# Patient Record
Sex: FEMALE | Race: WHITE | NOT HISPANIC OR LATINO | Employment: FULL TIME | URBAN - NONMETROPOLITAN AREA
[De-identification: names, ages, dates, MRNs, and addresses within clinical notes are randomized per-mention and may not be internally consistent; named-entity substitution may affect disease eponyms.]

---

## 2017-01-17 ENCOUNTER — HOSPITAL ENCOUNTER (OUTPATIENT)
Dept: URGENT CARE | Facility: CLINIC | Age: 22
Discharge: HOME | End: 2017-01-17
Attending: EMERGENCY MEDICINE | Admitting: EMERGENCY MEDICINE

## 2017-02-02 ENCOUNTER — OFFICE VISIT (OUTPATIENT)
Dept: FAMILY MEDICINE CLINIC | Facility: CLINIC | Age: 22
End: 2017-02-02

## 2017-02-02 VITALS
HEART RATE: 82 BPM | HEIGHT: 64 IN | SYSTOLIC BLOOD PRESSURE: 100 MMHG | BODY MASS INDEX: 20.14 KG/M2 | DIASTOLIC BLOOD PRESSURE: 60 MMHG | RESPIRATION RATE: 16 BRPM | WEIGHT: 118 LBS | TEMPERATURE: 97.9 F

## 2017-02-02 DIAGNOSIS — N63.0 BREAST MASS: Primary | ICD-10-CM

## 2017-02-02 PROCEDURE — 99213 OFFICE O/P EST LOW 20 MIN: CPT | Performed by: NURSE PRACTITIONER

## 2017-02-02 NOTE — PROGRESS NOTES
Subjective   Toshia Barraza is a 21 y.o. female.     Breast Mass   This is a chronic problem. The current episode started more than 1 month ago (breast mass noted left tail of calderon.  has had for several months.,  u/s showed a probable benign lymph node and recommeded that  repeat u//s be done in 3 months.  she can feel no changes in the mass.). The problem occurs constantly. The problem has been unchanged. Pertinent negatives include no abdominal pain, anorexia, arthralgias, change in bowel habit, chest pain, chills, congestion, coughing, diaphoresis, fatigue, fever, headaches, joint swelling, myalgias, nausea, neck pain, numbness, rash, sore throat, swollen glands, urinary symptoms, vertigo, visual change, vomiting or weakness. Nothing aggravates the symptoms. Treatments tried: has been on antibiotics and has had u/s. The treatment provided no relief (there have been no changes.).        The following portions of the patient's history were reviewed and updated as appropriate: allergies, current medications, past family history, past medical history, past social history, past surgical history and problem list.    Review of Systems   Constitutional: Negative.  Negative for chills, diaphoresis, fatigue and fever.   HENT: Negative for congestion and sore throat.    Respiratory: Negative.  Negative for cough.    Cardiovascular: Negative.  Negative for chest pain.   Gastrointestinal: Negative for abdominal pain, anorexia, change in bowel habit, nausea and vomiting.   Musculoskeletal: Negative.  Negative for arthralgias, joint swelling, myalgias and neck pain.   Skin: Negative.  Negative for rash.   Neurological: Negative.  Negative for vertigo, weakness, numbness and headaches.   Psychiatric/Behavioral: Negative.        Objective   Physical Exam   Constitutional: She is oriented to person, place, and time. She appears well-developed and well-nourished. No distress.   HENT:   Head: Normocephalic.   Eyes: Pupils are  equal, round, and reactive to light.   Neck: Normal range of motion.   Cardiovascular: Normal rate, regular rhythm and normal heart sounds.  Exam reveals no friction rub.    No murmur heard.  Pulmonary/Chest: Effort normal and breath sounds normal. No respiratory distress. She has no wheezes. She has no rales. Left breast exhibits mass (small palpable nodule left tail of calderon.). Left breast exhibits no inverted nipple, no nipple discharge, no skin change and no tenderness.   Musculoskeletal: Normal range of motion.   Neurological: She is alert and oriented to person, place, and time.   Skin: Skin is warm and dry.   Psychiatric: She has a normal mood and affect. Thought content normal.   Nursing note and vitals reviewed.      Assessment/Plan   Toshia was seen today for breast mass.    Diagnoses and all orders for this visit:    Breast mass    She is due for a new u/s.  [facility] [place].  Will reschedule.

## 2017-02-15 DIAGNOSIS — N63.0 BREAST MASS: Primary | ICD-10-CM

## 2017-10-02 ENCOUNTER — OFFICE VISIT (OUTPATIENT)
Dept: FAMILY MEDICINE CLINIC | Facility: CLINIC | Age: 22
End: 2017-10-02

## 2017-10-02 ENCOUNTER — LAB (OUTPATIENT)
Dept: LAB | Facility: OTHER | Age: 22
End: 2017-10-02

## 2017-10-02 VITALS
OXYGEN SATURATION: 98 % | TEMPERATURE: 98.6 F | RESPIRATION RATE: 16 BRPM | DIASTOLIC BLOOD PRESSURE: 69 MMHG | WEIGHT: 111 LBS | SYSTOLIC BLOOD PRESSURE: 102 MMHG | HEART RATE: 77 BPM | BODY MASS INDEX: 18.95 KG/M2 | HEIGHT: 64 IN

## 2017-10-02 DIAGNOSIS — R63.4 WEIGHT LOSS: ICD-10-CM

## 2017-10-02 DIAGNOSIS — G25.81 RLS (RESTLESS LEGS SYNDROME): Primary | ICD-10-CM

## 2017-10-02 LAB
ALBUMIN SERPL-MCNC: 4.4 G/DL (ref 3.2–5.5)
ALBUMIN/GLOB SERPL: 1.5 G/DL (ref 1–3)
ALP SERPL-CCNC: 62 U/L (ref 15–121)
ALT SERPL W P-5'-P-CCNC: 23 U/L (ref 10–60)
ANION GAP SERPL CALCULATED.3IONS-SCNC: 11 MMOL/L (ref 5–15)
AST SERPL-CCNC: 25 U/L (ref 10–60)
BASOPHILS # BLD AUTO: 0.02 10*3/MM3 (ref 0–0.2)
BASOPHILS NFR BLD AUTO: 0.4 % (ref 0–2)
BILIRUB SERPL-MCNC: 0.4 MG/DL (ref 0.2–1)
BUN BLD-MCNC: 14 MG/DL (ref 8–25)
BUN/CREAT SERPL: 23.3 (ref 7–25)
CALCIUM SPEC-SCNC: 9 MG/DL (ref 8.4–10.8)
CHLORIDE SERPL-SCNC: 103 MMOL/L (ref 100–112)
CO2 SERPL-SCNC: 27 MMOL/L (ref 20–32)
CREAT BLD-MCNC: 0.6 MG/DL (ref 0.4–1.3)
DEPRECATED RDW RBC AUTO: 39.5 FL (ref 36.4–46.3)
EOSINOPHIL # BLD AUTO: 0.41 10*3/MM3 (ref 0–0.7)
EOSINOPHIL NFR BLD AUTO: 7.6 % (ref 0–7)
ERYTHROCYTE [DISTWIDTH] IN BLOOD BY AUTOMATED COUNT: 12.4 % (ref 11.5–14.5)
GFR SERPL CREATININE-BSD FRML MDRD: 127 ML/MIN/1.73 (ref 71–165)
GLOBULIN UR ELPH-MCNC: 2.9 GM/DL (ref 2.5–4.6)
GLUCOSE BLD-MCNC: 61 MG/DL (ref 70–100)
HCT VFR BLD AUTO: 38.5 % (ref 35–45)
HGB BLD-MCNC: 13.2 G/DL (ref 12–15.5)
LYMPHOCYTES # BLD AUTO: 1.83 10*3/MM3 (ref 0.6–4.2)
LYMPHOCYTES NFR BLD AUTO: 34.1 % (ref 10–50)
MCH RBC QN AUTO: 30.6 PG (ref 26.5–34)
MCHC RBC AUTO-ENTMCNC: 34.3 G/DL (ref 31.4–36)
MCV RBC AUTO: 89.1 FL (ref 80–98)
MONOCYTES # BLD AUTO: 0.51 10*3/MM3 (ref 0–0.9)
MONOCYTES NFR BLD AUTO: 9.5 % (ref 0–12)
NEUTROPHILS # BLD AUTO: 2.6 10*3/MM3 (ref 2–8.6)
NEUTROPHILS NFR BLD AUTO: 48.4 % (ref 37–80)
PLATELET # BLD AUTO: 203 10*3/MM3 (ref 150–450)
PMV BLD AUTO: 11.6 FL (ref 8–12)
POTASSIUM BLD-SCNC: 3.9 MMOL/L (ref 3.4–5.4)
PROT SERPL-MCNC: 7.3 G/DL (ref 6.7–8.2)
RBC # BLD AUTO: 4.32 10*6/MM3 (ref 3.77–5.16)
SODIUM BLD-SCNC: 141 MMOL/L (ref 134–146)
WBC NRBC COR # BLD: 5.37 10*3/MM3 (ref 3.2–9.8)

## 2017-10-02 PROCEDURE — 99213 OFFICE O/P EST LOW 20 MIN: CPT | Performed by: NURSE PRACTITIONER

## 2017-10-02 PROCEDURE — 84443 ASSAY THYROID STIM HORMONE: CPT | Performed by: NURSE PRACTITIONER

## 2017-10-02 PROCEDURE — 83540 ASSAY OF IRON: CPT | Performed by: NURSE PRACTITIONER

## 2017-10-02 PROCEDURE — 85025 COMPLETE CBC W/AUTO DIFF WBC: CPT | Performed by: NURSE PRACTITIONER

## 2017-10-02 PROCEDURE — 80053 COMPREHEN METABOLIC PANEL: CPT | Performed by: NURSE PRACTITIONER

## 2017-10-02 PROCEDURE — 36415 COLL VENOUS BLD VENIPUNCTURE: CPT | Performed by: NURSE PRACTITIONER

## 2017-10-02 PROCEDURE — 83550 IRON BINDING TEST: CPT | Performed by: NURSE PRACTITIONER

## 2017-10-02 RX ORDER — ROPINIROLE 0.5 MG/1
0.5 TABLET, FILM COATED ORAL NIGHTLY
Qty: 30 TABLET | Refills: 3 | Status: SHIPPED | OUTPATIENT
Start: 2017-10-02 | End: 2017-11-07 | Stop reason: DRUGHIGH

## 2017-10-03 LAB
IRON 24H UR-MRATE: 73 MCG/DL (ref 37–170)
IRON SATN MFR SERPL: 26 % (ref 15–50)
TIBC SERPL-MCNC: 281 MCG/DL (ref 265–497)
TSH SERPL DL<=0.05 MIU/L-ACNC: 1.09 MIU/ML (ref 0.46–4.68)

## 2017-11-07 ENCOUNTER — OFFICE VISIT (OUTPATIENT)
Dept: FAMILY MEDICINE CLINIC | Facility: CLINIC | Age: 22
End: 2017-11-07

## 2017-11-07 VITALS
HEIGHT: 64 IN | SYSTOLIC BLOOD PRESSURE: 100 MMHG | BODY MASS INDEX: 18.95 KG/M2 | RESPIRATION RATE: 20 BRPM | WEIGHT: 111 LBS | TEMPERATURE: 98.2 F | OXYGEN SATURATION: 98 % | HEART RATE: 84 BPM | DIASTOLIC BLOOD PRESSURE: 58 MMHG

## 2017-11-07 DIAGNOSIS — M25.562 LEFT KNEE PAIN, UNSPECIFIED CHRONICITY: Primary | ICD-10-CM

## 2017-11-07 DIAGNOSIS — G25.81 RLS (RESTLESS LEGS SYNDROME): ICD-10-CM

## 2017-11-07 PROCEDURE — 99213 OFFICE O/P EST LOW 20 MIN: CPT | Performed by: NURSE PRACTITIONER

## 2017-11-07 RX ORDER — ROPINIROLE 1 MG/1
1 TABLET, FILM COATED ORAL NIGHTLY
Qty: 30 TABLET | Refills: 5 | Status: SHIPPED | OUTPATIENT
Start: 2017-11-07 | End: 2018-05-08

## 2017-11-07 NOTE — PROGRESS NOTES
Subjective   Toshia Barraza is a 22 y.o. female.     HPI Comments: Here for malathi on her rls.  She says the requip does help, but she thinks it may need to be stronger.  Also she is having bilat knee pain with the worse being on the left.  No injuries reported.    Leg Pain    The incident occurred more than 1 week ago (has had knee pain for months.). There was no injury mechanism. The pain is present in the right knee and left knee. The quality of the pain is described as aching. The pain is at a severity of 7/10. The pain is severe. The pain has been intermittent since onset. Pertinent negatives include no inability to bear weight, loss of motion, loss of sensation, muscle weakness, numbness or tingling. She reports no foreign bodies present. The symptoms are aggravated by weight bearing. She has tried nothing for the symptoms. The treatment provided no relief.        The following portions of the patient's history were reviewed and updated as appropriate: allergies, current medications, past family history, past medical history, past social history, past surgical history and problem list.    Review of Systems   Constitutional: Negative.    HENT: Negative.    Respiratory: Negative.    Cardiovascular: Negative.    Musculoskeletal: Positive for arthralgias (knees).   Skin: Negative.    Neurological: Negative.  Negative for tingling and numbness.   Psychiatric/Behavioral: Negative.        Objective   Physical Exam   Constitutional: She is oriented to person, place, and time. She appears well-nourished.   HENT:   Head: Normocephalic.   Eyes: Pupils are equal, round, and reactive to light.   Neck: Normal range of motion. Neck supple. No thyromegaly present.   Cardiovascular: Normal rate, regular rhythm and normal heart sounds.  Exam reveals no friction rub.    No murmur heard.  Pulmonary/Chest: Effort normal and breath sounds normal. No respiratory distress. She has no wheezes. She has no rales.   Abdominal: Soft.    Musculoskeletal: Normal range of motion.        Right knee: She exhibits normal range of motion and no swelling. Tenderness found.        Left knee: She exhibits normal range of motion and no swelling. Tenderness found.   Has good rom of both knees.  Is able to bear weight without diff.  There is slight crepitus with rom of the left knee.  X ray of left knee is normal.   Neurological: She is alert and oriented to person, place, and time.   Skin: Skin is warm and dry.   Psychiatric: She has a normal mood and affect.   Nursing note and vitals reviewed.      Assessment/Plan   Toshia was seen today for leg pain.    Diagnoses and all orders for this visit:    Left knee pain, unspecified chronicity  -     Cancel: XR Knee 3 View Left  -     XR knee 4+ vw left    RLS (restless legs syndrome)  -     rOPINIRole (REQUIP) 1 MG tablet; Take 1 tablet by mouth Every Night. Take 1 hour before bedtime.      Have asked her to take tylenol arthritis that can be purchased otc.  Also her requip dosage can be increased to 1mg q hs.

## 2017-11-15 ENCOUNTER — APPOINTMENT (OUTPATIENT)
Dept: ULTRASOUND IMAGING | Facility: HOSPITAL | Age: 22
End: 2017-11-15

## 2017-11-15 ENCOUNTER — HOSPITAL ENCOUNTER (EMERGENCY)
Facility: HOSPITAL | Age: 22
Discharge: HOME | End: 2017-11-15
Attending: FAMILY MEDICINE | Admitting: FAMILY MEDICINE

## 2017-11-15 VITALS
RESPIRATION RATE: 18 BRPM | TEMPERATURE: 97.6 F | WEIGHT: 111 LBS | DIASTOLIC BLOOD PRESSURE: 72 MMHG | HEIGHT: 64 IN | BODY MASS INDEX: 18.95 KG/M2 | OXYGEN SATURATION: 99 % | SYSTOLIC BLOOD PRESSURE: 91 MMHG | HEART RATE: 75 BPM

## 2017-11-15 DIAGNOSIS — M79.605 LEG PAIN, ANTERIOR, LEFT: Primary | ICD-10-CM

## 2017-11-15 PROCEDURE — 99283 EMERGENCY DEPT VISIT LOW MDM: CPT

## 2017-11-15 PROCEDURE — 93971 EXTREMITY STUDY: CPT

## 2017-11-15 RX ORDER — IBUPROFEN 200 MG
200 TABLET ORAL EVERY 6 HOURS PRN
Qty: 30 TABLET | Refills: 0 | Status: SHIPPED | OUTPATIENT
Start: 2017-11-15 | End: 2017-11-25

## 2017-11-16 NOTE — ED PROVIDER NOTES
Subjective   HPI Comments: 22 year old presents for left leg pain and swelling for 6 months . She feels fluttering in her left vein .   Below her right knee - pain and fluttering in her vein .   Denies any tingling or numbness.   Recently seen by pcp for this and had x rays done - negative for any fracture.   Hurts in upper aspect of right leg.             History provided by:  Patient      Review of Systems   Constitutional: Negative for activity change, appetite change, chills, diaphoresis and fatigue.   HENT: Negative for congestion, dental problem, drooling, ear discharge and ear pain.    Eyes: Negative for pain, discharge, redness and itching.   Respiratory: Negative for apnea, cough, choking and chest tightness.    Cardiovascular: Negative for chest pain, palpitations and leg swelling.   Gastrointestinal: Negative for abdominal distention, abdominal pain, anal bleeding and blood in stool.   Endocrine: Negative for cold intolerance, heat intolerance, polydipsia and polyphagia.   Genitourinary: Negative for difficulty urinating, dyspareunia, dysuria, enuresis, flank pain and frequency.   Musculoskeletal: Negative for arthralgias, back pain, gait problem and joint swelling.   Allergic/Immunologic: Negative for environmental allergies, food allergies and immunocompromised state.   Neurological: Negative for seizures, light-headedness, numbness and headaches.   Psychiatric/Behavioral: Negative for agitation, behavioral problems, confusion and decreased concentration.       Past Medical History:   Diagnosis Date   • Acute bacterial sinusitis    • Distorted body image    • Early onset of delivery before 37 weeks    • Encounter for  visit     Post mague care status   • Encounter for  visit      visit status   • Malaise and fatigue    • Primigravida        No Known Allergies    Past Surgical History:   Procedure Laterality Date   • BREAST BIOPSY         Family History   Problem Relation Age  "of Onset   • Other Mother      hematologic disorder   • Heart disease Mother      ischemic heart disease   • Heart attack Mother    • Leukemia Mother    • Ovarian cancer Mother      questionable   • Heart disease Father    • Hypertension Father        Social History     Social History   • Marital status: Single     Spouse name: N/A   • Number of children: N/A   • Years of education: N/A     Social History Main Topics   • Smoking status: Never Smoker   • Smokeless tobacco: Never Used   • Alcohol use No   • Drug use: No   • Sexual activity: Yes     Birth control/ protection: Condom     Other Topics Concern   • None     Social History Narrative           Objective    BP 91/72  Pulse 75  Temp 97.6 °F (36.4 °C)  Resp 18  Ht 64\" (162.6 cm)  Wt 111 lb (50.3 kg)  LMP 11/02/2017 (Approximate)  SpO2 99%  BMI 19.05 kg/m2    Physical Exam   Constitutional: She is oriented to person, place, and time. She appears well-developed and well-nourished.   HENT:   Head: Normocephalic and atraumatic.   Right Ear: External ear normal.   Left Ear: External ear normal.   Nose: Nose normal.   Mouth/Throat: Oropharynx is clear and moist.   Eyes: Conjunctivae and EOM are normal. Pupils are equal, round, and reactive to light.   Neck: Normal range of motion. Neck supple.   Cardiovascular: Normal rate, regular rhythm, normal heart sounds and intact distal pulses.    Pulmonary/Chest: Effort normal and breath sounds normal. No accessory muscle usage. No respiratory distress. She exhibits no tenderness and no deformity.   Abdominal: Soft. Bowel sounds are normal. There is no tenderness.   Musculoskeletal: Normal range of motion.        Left knee: Tenderness found.        Legs:  Neurological: She is alert and oriented to person, place, and time. She has normal reflexes.   Skin: Skin is warm.   Psychiatric: She has a normal mood and affect. Her behavior is normal. Judgment and thought content normal.   Nursing note and vitals " reviewed.      Procedures         ED Course  ED Course     Labs Reviewed - No data to display  Xr Knee 4+ Vw Left    Result Date: 11/7/2017  Narrative: Left knee four view on 11/7/2017 CLINICAL INDICATION: Left knee pain COMPARISON: None FINDINGS: No joint effusion is noted. There are no fractures. Visualized joints are well aligned. No bony abnormality is noted.     Impression: No acute bony abnormality. Electronically signed by:  Homar Jarquin  11/7/2017 9:59 PM CST Workstation: [id number]    Us Venous Doppler Lower Extremity Left (duplex)    Result Date: 11/15/2017  Narrative: Exam: Left lower extremity venous ultrasound INDICATION: Left leg pain FINDINGS: The left common femoral, greater saphenous, profunda femoral, femoral, popliteal, posterior tibial and peroneal veins demonstrate normal flow compressibility and augmentation.     Impression: No evidence of deep venous thrombosis in the left lower extremity on the current examination Electronically signed by:  Shalom Gutierrez MD  11/15/2017 11:08 PM CST Workstation: [id number]      Discussed results with pt .   Chronic left leg pain for few months.     Pt already had x rays for this and was negative for bony anomaly.   Discussed need for follow up with pcp and possible neuro referral for possible neuropathy             MDM  Number of Diagnoses or Management Options  Leg pain, anterior, left:       Final diagnoses:   Leg pain, anterior, left            Kadi Arnold MD  11/15/17 8629

## 2017-11-16 NOTE — ED NOTES
Pt reports she has been having left knee pain for several months. States she had an xray last week for same. Also states she has varicose veins that are starting on her lower legs. States she is a  and is on her feet all the time.      Namrata Minor RN  11/15/17 1349

## 2018-01-25 ENCOUNTER — OFFICE VISIT (OUTPATIENT)
Dept: FAMILY MEDICINE CLINIC | Facility: CLINIC | Age: 23
End: 2018-01-25

## 2018-01-25 VITALS
SYSTOLIC BLOOD PRESSURE: 98 MMHG | TEMPERATURE: 98 F | DIASTOLIC BLOOD PRESSURE: 60 MMHG | WEIGHT: 113 LBS | HEIGHT: 64 IN | OXYGEN SATURATION: 98 % | BODY MASS INDEX: 19.29 KG/M2 | RESPIRATION RATE: 20 BRPM | HEART RATE: 75 BPM

## 2018-01-25 DIAGNOSIS — R20.0 NUMBNESS OF BOTH LOWER EXTREMITIES: Primary | ICD-10-CM

## 2018-01-25 DIAGNOSIS — M25.561 RIGHT KNEE PAIN, UNSPECIFIED CHRONICITY: ICD-10-CM

## 2018-01-25 PROCEDURE — 99214 OFFICE O/P EST MOD 30 MIN: CPT | Performed by: NURSE PRACTITIONER

## 2018-01-25 RX ORDER — NAPROXEN 500 MG/1
500 TABLET ORAL 2 TIMES DAILY WITH MEALS
Qty: 60 TABLET | Refills: 3 | Status: SHIPPED | OUTPATIENT
Start: 2018-01-25 | End: 2018-05-08

## 2018-01-26 PROBLEM — R20.0 NUMBNESS OF BOTH LOWER EXTREMITIES: Status: ACTIVE | Noted: 2018-01-26

## 2018-01-26 PROBLEM — M25.561 RIGHT KNEE PAIN: Status: ACTIVE | Noted: 2018-01-26

## 2018-01-26 NOTE — PROGRESS NOTES
Subjective   Toshia Barraza is a 22 y.o. female.     HPI Comments: Here today for malathi.  She cont to have numbness in her legs.  Has been to the er.  All tests so far have been negative.  Now she has new right knee pain.  No injuries have been reported.  Has had c/o left knee pain.  The x rays were negative.    Lower Extremity Issue   This is a chronic (right knee pain is new, but the c/o pain and numbness in legs is chronic and been going on for a while.) problem. The current episode started more than 1 year ago. The problem occurs intermittently. The problem has been waxing and waning. Associated symptoms include arthralgias, fatigue, myalgias, numbness and weakness. Pertinent negatives include no abdominal pain, anorexia, change in bowel habit, chest pain, chills, congestion, coughing, diaphoresis, fever, headaches, joint swelling, nausea, neck pain, rash, sore throat, swollen glands, urinary symptoms, vertigo, visual change or vomiting. The symptoms are aggravated by stress and standing. Treatments tried: has been on nsaid and requip. The treatment provided no relief.        The following portions of the patient's history were reviewed and updated as appropriate: allergies, current medications, past family history, past medical history, past social history, past surgical history and problem list.    Review of Systems   Constitutional: Positive for fatigue. Negative for chills, diaphoresis and fever.   HENT: Negative.  Negative for congestion and sore throat.    Respiratory: Negative.  Negative for cough.    Cardiovascular: Negative for chest pain.   Gastrointestinal: Negative.  Negative for abdominal pain, anorexia, change in bowel habit, nausea and vomiting.   Musculoskeletal: Positive for arthralgias and myalgias. Negative for joint swelling and neck pain.   Skin: Negative.  Negative for rash.   Neurological: Positive for weakness and numbness. Negative for vertigo and headaches.   Psychiatric/Behavioral:  Negative.        Objective   Physical Exam   Constitutional: She is oriented to person, place, and time. She appears well-developed and well-nourished. No distress.   HENT:   Head: Normocephalic.   Eyes: Pupils are equal, round, and reactive to light.   Neck: Normal range of motion. Neck supple. No thyromegaly present.   Cardiovascular: Normal rate, regular rhythm and normal heart sounds.  Exam reveals no friction rub.    No murmur heard.  Pulmonary/Chest: Effort normal and breath sounds normal. No respiratory distress. She has no wheezes.   Abdominal: Soft.   Musculoskeletal: Normal range of motion.        Right knee: She exhibits normal range of motion and no swelling. Tenderness found.   X ray of right knee is negative.   Neurological: She is alert and oriented to person, place, and time.   Seems to have good sensation in both lower ext.   Skin: Skin is warm and dry.   Psychiatric: She has a normal mood and affect. Thought content normal.   Nursing note and vitals reviewed.      Assessment/Plan   Toshia was seen today for restless legs syndrome.    Diagnoses and all orders for this visit:    Numbness of both lower extremities  -     Ambulatory Referral to Neurology    Right knee pain, unspecified chronicity  -     Cancel: XR Knee 1 or 2 View Right (In Office)  -     XR knee 4+ vw right  -     naproxen (NAPROSYN) 500 MG tablet; Take 1 tablet by mouth 2 (Two) Times a Day With Meals.

## 2018-05-08 ENCOUNTER — PROCEDURE VISIT (OUTPATIENT)
Dept: OBSTETRICS AND GYNECOLOGY | Facility: CLINIC | Age: 23
End: 2018-05-08

## 2018-05-08 VITALS
SYSTOLIC BLOOD PRESSURE: 116 MMHG | HEIGHT: 64 IN | WEIGHT: 116 LBS | DIASTOLIC BLOOD PRESSURE: 60 MMHG | BODY MASS INDEX: 19.81 KG/M2

## 2018-05-08 DIAGNOSIS — Z30.011 ENCOUNTER FOR INITIAL PRESCRIPTION OF CONTRACEPTIVE PILLS: ICD-10-CM

## 2018-05-08 DIAGNOSIS — Z01.419 WELL WOMAN EXAM WITH ROUTINE GYNECOLOGICAL EXAM: ICD-10-CM

## 2018-05-08 DIAGNOSIS — R10.2 PELVIC PAIN: ICD-10-CM

## 2018-05-08 DIAGNOSIS — N89.8 VAGINAL DISCHARGE: Primary | ICD-10-CM

## 2018-05-08 LAB
CANDIDA ALBICANS: NEGATIVE
GARDNERELLA VAGINALIS: POSITIVE
TRICHOMONAS VAGINALIS PCR: NEGATIVE

## 2018-05-08 PROCEDURE — 87491 CHLMYD TRACH DNA AMP PROBE: CPT | Performed by: OBSTETRICS & GYNECOLOGY

## 2018-05-08 PROCEDURE — 87660 TRICHOMONAS VAGIN DIR PROBE: CPT | Performed by: OBSTETRICS & GYNECOLOGY

## 2018-05-08 PROCEDURE — 99214 OFFICE O/P EST MOD 30 MIN: CPT | Performed by: OBSTETRICS & GYNECOLOGY

## 2018-05-08 PROCEDURE — 87591 N.GONORRHOEAE DNA AMP PROB: CPT | Performed by: OBSTETRICS & GYNECOLOGY

## 2018-05-08 PROCEDURE — 87510 GARDNER VAG DNA DIR PROBE: CPT | Performed by: OBSTETRICS & GYNECOLOGY

## 2018-05-08 PROCEDURE — 87661 TRICHOMONAS VAGINALIS AMPLIF: CPT | Performed by: OBSTETRICS & GYNECOLOGY

## 2018-05-08 PROCEDURE — G0123 SCREEN CERV/VAG THIN LAYER: HCPCS | Performed by: OBSTETRICS & GYNECOLOGY

## 2018-05-08 PROCEDURE — 87480 CANDIDA DNA DIR PROBE: CPT | Performed by: OBSTETRICS & GYNECOLOGY

## 2018-05-08 RX ORDER — DESOGESTREL AND ETHINYL ESTRADIOL 0.15-0.03
1 KIT ORAL DAILY
Qty: 28 TABLET | Refills: 12 | Status: SHIPPED | OUTPATIENT
Start: 2018-05-08 | End: 2018-09-19

## 2018-05-08 NOTE — PROGRESS NOTES
"  No chief complaint on file.    Toshia Barraza is a 23 y.o. year old .  Patient's last menstrual period was 2018 (within weeks).  She presents with a chief complaint of lower abdominal pain for the past month and a half.  She's also noted a discharge with an odor.  It all started after intercourse.  She does not have a new partner.  She sometimes uses condoms but not always.  When she had intercourse it felt like something exploded inside her.  It's been slowly getting better.  She would also like to get on birth control pills        Past Medical History:   Diagnosis Date   • Acute bacterial sinusitis    • Distorted body image    • Early onset of delivery before 37 weeks    • Encounter for  visit     Post  care status   • Encounter for  visit      visit status   • Malaise and fatigue    • Primigravida      Past Surgical History:   Procedure Laterality Date   • BREAST BIOPSY       No current outpatient prescriptions on file.  No Known Allergies  Smoking status: Never Smoker                                                              Smokeless tobacco: Never Used                        Review of Systems   Constitutional: Negative for activity change, appetite change, chills and fever.   Gastrointestinal: Negative for abdominal distention and abdominal pain.   Genitourinary: Positive for pelvic pain. Negative for difficulty urinating, dysuria, flank pain, genital sores, vaginal bleeding, vaginal discharge and vaginal pain.   All other systems reviewed and are negative.       /60   Ht 162.6 cm (64\")   Wt 52.6 kg (116 lb)   LMP 2018 (Within Weeks)   BMI 19.91 kg/m²     General:  well developed; well nourished  no acute distress   Thyroid: not examined   Lungs:  breathing is unlabored   Heart:  Not performed.   Breasts:  Not performed.   Abdomen: soft, non-tender; no masses   Pelvis: Clinical staff was present for exam  External genitalia:  normal " appearance of the external genitalia including Bartholin's and Lostine's glands.  :  urethral meatus normal;  Vaginal:  normal pink mucosa without prolapse or lesions.  Cervix:  normal appearance.  Uterus:  normal size, shape and consistency.  Adnexa:  normal bimanual exam of the adnexa.  Rectal:  digital rectal exam not performed; anus visually normal appearing.     Lab Review   No data reviewed      Imaging   No data reviewed    Assessment      Diagnosis Plan   1. Vaginal discharge  Chlamydia trachomatis, Neisseria gonorrhoeae, Trichomonas vaginalis, PCR - Swab, Cervix    Gardnerella vaginalis, Trichomonas vaginalis, Candida albicans, PCR - Swab, Vagina   2. Well woman exam with routine gynecological exam  Liquid-based Pap Smear, Screening   3. Pelvic pain  US Non-ob Transvaginal   4. Encounter for initial prescription of contraceptive pills            Plan   1. We'll get a pelvic ultrasound and see the patient back after the ultrasound secondary to the pain.  2. A Pap smear was performed of this patient is now 23.  3. Vaginal cultures and a cervical culture was done secondary to the discharge  4. A prescription for birth control pills is been sent to her pharmacy  5.   The risks of birth control pills were reviewed with the patient, the risks that were reviewed included but were not limited to, increased risk for heart disease, increased risk for stroke, increased risk of blood clot formation and increased risk for gallbladder disease.  We also reviewed the common side effects of birth control pills including headache, breast tenderness, nausea, irregular bleeding, and occasional missed periods.         This note was electronically signed.    Austen Mandel MD  May 8, 2018

## 2018-05-09 LAB
C TRACH RRNA CVX QL NAA+PROBE: NEGATIVE
N GONORRHOEA RRNA SPEC QL NAA+PROBE: NEGATIVE
T VAGINALIS DNA VAG QL PROBE+SIG AMP: NEGATIVE

## 2018-05-09 RX ORDER — METRONIDAZOLE 500 MG/1
500 TABLET ORAL 2 TIMES DAILY
Qty: 14 TABLET | Refills: 0 | Status: SHIPPED | OUTPATIENT
Start: 2018-05-09 | End: 2018-05-16

## 2018-05-11 LAB
LAB AP CASE REPORT: NORMAL
LAB AP GYN ADDITIONAL INFORMATION: NORMAL
LAB AP GYN OTHER FINDINGS: NORMAL
Lab: NORMAL
PATH INTERP SPEC-IMP: NORMAL
STAT OF ADQ CVX/VAG CYTO-IMP: NORMAL

## 2018-05-14 ENCOUNTER — OFFICE VISIT (OUTPATIENT)
Dept: OBSTETRICS AND GYNECOLOGY | Facility: CLINIC | Age: 23
End: 2018-05-14

## 2018-05-14 VITALS
DIASTOLIC BLOOD PRESSURE: 62 MMHG | BODY MASS INDEX: 19.81 KG/M2 | SYSTOLIC BLOOD PRESSURE: 110 MMHG | HEIGHT: 64 IN | WEIGHT: 116 LBS

## 2018-05-14 DIAGNOSIS — N89.8 VAGINAL DISCHARGE: Primary | ICD-10-CM

## 2018-05-14 DIAGNOSIS — R10.2 PELVIC PAIN: ICD-10-CM

## 2018-05-14 PROCEDURE — 99213 OFFICE O/P EST LOW 20 MIN: CPT | Performed by: OBSTETRICS & GYNECOLOGY

## 2018-05-14 NOTE — PROGRESS NOTES
"Subjective   Chief Complaint   Patient presents with   • Follow-up     scan result     Toshia Barraza is a 23 y.o. year old  who comes to review her recent testing and discuss next steps.She was seen on 18 with a chief complaint of lower abdominal pain.  She notes the pain is now better.  She also had a discharge with an odor.  She was positive for BV and that's been treated.  She had an ultrasound today which is essentially within normal limits although uterus is slightly enlarged with a volume of 126.39 cm³.  The right and left ovaries appear normal.      Review of Systems   Constitutional: Negative for activity change, appetite change, chills and fever.   Gastrointestinal: Negative for abdominal distention and abdominal pain.   Genitourinary: Positive for pelvic pain. Negative for difficulty urinating, dysuria, flank pain, genital sores, vaginal bleeding, vaginal discharge and vaginal pain.          Objective   /62   Ht 162.6 cm (64\")   Wt 52.6 kg (116 lb)   LMP 2018 (Within Weeks)   Breastfeeding? No   BMI 19.91 kg/m²     Physical Exam      General:  well developed; well nourished  no acute distress     Thyroid: not examined     Heart:  Not performed.     Lungs:  breathing is unlabored     Breasts:  Not performed.     Abdomen: Not performed.     Pelvis: Clinical staff was present for exam         Lab Review   No data reviewed      Imaging   Pelvic ultrasound report           Assessment       Improving pelvic pain  Plan   1. As the pain is improving, we will just watch her conservatively.  If the pain worsen she is to return.            This note was electronically signed.    Austen Mandel M.D.  May 14, 2018    "

## 2018-06-21 ENCOUNTER — TELEPHONE (OUTPATIENT)
Dept: FAMILY MEDICINE CLINIC | Facility: CLINIC | Age: 23
End: 2018-06-21

## 2018-06-21 ENCOUNTER — OFFICE VISIT (OUTPATIENT)
Dept: FAMILY MEDICINE CLINIC | Facility: CLINIC | Age: 23
End: 2018-06-21

## 2018-06-21 VITALS
HEIGHT: 64 IN | RESPIRATION RATE: 16 BRPM | TEMPERATURE: 97.9 F | SYSTOLIC BLOOD PRESSURE: 110 MMHG | BODY MASS INDEX: 19.46 KG/M2 | WEIGHT: 114 LBS | HEART RATE: 87 BPM | DIASTOLIC BLOOD PRESSURE: 60 MMHG | OXYGEN SATURATION: 97 %

## 2018-06-21 DIAGNOSIS — M79.602 LEFT ARM PAIN: ICD-10-CM

## 2018-06-21 DIAGNOSIS — R68.84 JAW PAIN: ICD-10-CM

## 2018-06-21 DIAGNOSIS — M25.562 ACUTE PAIN OF LEFT KNEE: ICD-10-CM
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
## 2018-06-21 DIAGNOSIS — M25.532 LEFT WRIST PAIN: ICD-10-CM

## 2018-06-21 DIAGNOSIS — R11.0 NAUSEA: ICD-10-CM

## 2018-06-21 DIAGNOSIS — G44.319 ACUTE POST-TRAUMATIC HEADACHE, NOT INTRACTABLE: ICD-10-CM

## 2018-06-21 DIAGNOSIS — M54.50 ACUTE LEFT-SIDED LOW BACK PAIN WITHOUT SCIATICA: ICD-10-CM

## 2018-06-21 PROCEDURE — 99214 OFFICE O/P EST MOD 30 MIN: CPT | Performed by: NURSE PRACTITIONER

## 2018-06-21 RX ORDER — IBUPROFEN 800 MG/1
800 TABLET ORAL EVERY 8 HOURS PRN
Qty: 90 TABLET | Refills: 0 | Status: SHIPPED | OUTPATIENT
Start: 2018-06-21 | End: 2018-11-05 | Stop reason: SDUPTHER

## 2018-06-21 RX ORDER — KETOROLAC TROMETHAMINE 30 MG/ML
60 INJECTION, SOLUTION INTRAMUSCULAR; INTRAVENOUS ONCE
Status: SHIPPED | OUTPATIENT
Start: 2018-06-21 | End: 2018-06-26

## 2018-06-21 RX ORDER — PREDNISONE 20 MG/1
TABLET ORAL
Qty: 24 TABLET | Refills: 0 | Status: SHIPPED | OUTPATIENT
Start: 2018-06-21 | End: 2018-08-21

## 2018-06-21 RX ORDER — ONDANSETRON 4 MG/1
4 TABLET, FILM COATED ORAL EVERY 8 HOURS PRN
Qty: 30 TABLET | Refills: 0 | Status: SHIPPED | OUTPATIENT
Start: 2018-06-21 | End: 2018-09-19

## 2018-06-21 RX ORDER — SUMATRIPTAN 25 MG/1
TABLET, FILM COATED ORAL
Qty: 20 TABLET | Refills: 0 | Status: SHIPPED | OUTPATIENT
Start: 2018-06-21 | End: 2018-11-16 | Stop reason: SDUPTHER

## 2018-06-21 RX ORDER — TRIAMCINOLONE ACETONIDE 40 MG/ML
40 INJECTION, SUSPENSION INTRA-ARTICULAR; INTRAMUSCULAR ONCE
Status: DISCONTINUED | OUTPATIENT
Start: 2018-06-21 | End: 2019-09-04

## 2018-06-21 NOTE — TELEPHONE ENCOUNTER
----- Message from LATRICE Elizabeth sent at 6/21/2018 12:31 PM CDT -----  Left knee, left wrist and arm show no fractures or any abnormalities so most likely a lot of inflammation still present causing pain for her.

## 2018-06-21 NOTE — TELEPHONE ENCOUNTER
Called pt and relayed results. She was happy and she also had a question about PT and something else I didn't see anything in the chart yet.
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
## 2018-06-28 ENCOUNTER — OFFICE VISIT (OUTPATIENT)
Dept: FAMILY MEDICINE CLINIC | Facility: CLINIC | Age: 23
End: 2018-06-28

## 2018-06-28 VITALS
RESPIRATION RATE: 16 BRPM | BODY MASS INDEX: 19.46 KG/M2 | TEMPERATURE: 98.5 F | WEIGHT: 114 LBS | DIASTOLIC BLOOD PRESSURE: 50 MMHG | SYSTOLIC BLOOD PRESSURE: 90 MMHG | OXYGEN SATURATION: 98 % | HEART RATE: 75 BPM | HEIGHT: 64 IN

## 2018-06-28 DIAGNOSIS — R51.9 ACUTE NONINTRACTABLE HEADACHE, UNSPECIFIED HEADACHE TYPE: ICD-10-CM

## 2018-06-28 DIAGNOSIS — R51.9 LT FACIAL PAIN: ICD-10-CM
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
## 2018-06-28 DIAGNOSIS — R68.84 JAW PAIN: ICD-10-CM

## 2018-06-28 DIAGNOSIS — M54.6 ACUTE LEFT-SIDED THORACIC BACK PAIN: ICD-10-CM

## 2018-06-28 PROCEDURE — 99214 OFFICE O/P EST MOD 30 MIN: CPT | Performed by: NURSE PRACTITIONER

## 2018-06-28 RX ORDER — METHOCARBAMOL 500 MG/1
500 TABLET, FILM COATED ORAL EVERY 6 HOURS PRN
Qty: 90 TABLET | Refills: 0 | Status: SHIPPED | OUTPATIENT
Start: 2018-06-28 | End: 2018-11-05 | Stop reason: SDUPTHER

## 2018-06-29 ENCOUNTER — TELEPHONE (OUTPATIENT)
Dept: FAMILY MEDICINE CLINIC | Facility: CLINIC | Age: 23
End: 2018-06-29
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
## 2018-07-03 ENCOUNTER — CONSULT (OUTPATIENT)
Dept: PHYSICAL THERAPY | Facility: CLINIC | Age: 23
End: 2018-07-03

## 2018-07-03 DIAGNOSIS — M54.6 ACUTE LEFT-SIDED THORACIC BACK PAIN: Primary | ICD-10-CM
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
## 2018-07-03 DIAGNOSIS — R51.9 FREQUENT HEADACHES: ICD-10-CM

## 2018-07-03 DIAGNOSIS — M54.2 CERVICAL PAIN: ICD-10-CM

## 2018-07-03 PROCEDURE — 97140 MANUAL THERAPY 1/> REGIONS: CPT | Performed by: PHYSICAL THERAPIST

## 2018-07-03 PROCEDURE — 97161 PT EVAL LOW COMPLEX 20 MIN: CPT | Performed by: PHYSICAL THERAPIST
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
## 2018-07-11 ENCOUNTER — TREATMENT (OUTPATIENT)
Dept: PHYSICAL THERAPY | Facility: CLINIC | Age: 23
End: 2018-07-11

## 2018-07-11 DIAGNOSIS — M54.6 ACUTE LEFT-SIDED THORACIC BACK PAIN: Primary | ICD-10-CM

## 2018-07-11 DIAGNOSIS — M54.2 CERVICAL PAIN: ICD-10-CM

## 2018-07-11 DIAGNOSIS — R51.9 FREQUENT HEADACHES: ICD-10-CM
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
## 2018-07-11 PROCEDURE — 97110 THERAPEUTIC EXERCISES: CPT | Performed by: PHYSICAL THERAPIST

## 2018-07-11 PROCEDURE — 97014 ELECTRIC STIMULATION THERAPY: CPT | Performed by: PHYSICAL THERAPIST
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
## 2018-07-18 ENCOUNTER — TREATMENT (OUTPATIENT)
Dept: PHYSICAL THERAPY | Facility: CLINIC | Age: 23
End: 2018-07-18

## 2018-07-18 DIAGNOSIS — M54.6 ACUTE LEFT-SIDED THORACIC BACK PAIN: Primary | ICD-10-CM
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
## 2018-07-18 DIAGNOSIS — M54.2 CERVICAL PAIN: ICD-10-CM

## 2018-07-18 DIAGNOSIS — R51.9 FREQUENT HEADACHES: ICD-10-CM

## 2018-07-18 PROCEDURE — 97014 ELECTRIC STIMULATION THERAPY: CPT | Performed by: PHYSICAL THERAPIST

## 2018-07-18 PROCEDURE — 97110 THERAPEUTIC EXERCISES: CPT | Performed by: PHYSICAL THERAPIST
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
## 2018-07-19 ENCOUNTER — TREATMENT (OUTPATIENT)
Dept: PHYSICAL THERAPY | Facility: CLINIC | Age: 23
End: 2018-07-19

## 2018-07-19 DIAGNOSIS — R51.9 FREQUENT HEADACHES: ICD-10-CM
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
## 2018-07-19 DIAGNOSIS — M54.2 CERVICAL PAIN: ICD-10-CM

## 2018-07-19 DIAGNOSIS — M54.6 ACUTE LEFT-SIDED THORACIC BACK PAIN: Primary | ICD-10-CM

## 2018-07-19 PROCEDURE — 97012 MECHANICAL TRACTION THERAPY: CPT | Performed by: PHYSICAL THERAPIST

## 2018-07-19 PROCEDURE — 97014 ELECTRIC STIMULATION THERAPY: CPT | Performed by: PHYSICAL THERAPIST

## 2018-07-19 PROCEDURE — 97110 THERAPEUTIC EXERCISES: CPT | Performed by: PHYSICAL THERAPIST
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
## 2018-08-01 ENCOUNTER — TREATMENT (OUTPATIENT)
Dept: PHYSICAL THERAPY | Facility: CLINIC | Age: 23
End: 2018-08-01
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
## 2018-08-01 DIAGNOSIS — M54.6 ACUTE LEFT-SIDED THORACIC BACK PAIN: Primary | ICD-10-CM

## 2018-08-01 DIAGNOSIS — R51.9 FREQUENT HEADACHES: ICD-10-CM

## 2018-08-01 DIAGNOSIS — M54.2 CERVICAL PAIN: ICD-10-CM

## 2018-08-01 PROCEDURE — 97110 THERAPEUTIC EXERCISES: CPT | Performed by: PHYSICAL THERAPIST

## 2018-08-01 PROCEDURE — 97012 MECHANICAL TRACTION THERAPY: CPT | Performed by: PHYSICAL THERAPIST

## 2018-08-01 NOTE — PROGRESS NOTES
"Recertification    Diagnosis/ICD-10 Code:  Acute left-sided thoracic back pain [M54.6]  Referring practitioner: LATRICE Elizabeth  Date of Initial Visit: 2018  Patient seen for 5/8 sessions    Time in: 1534 Time out: 1618 Total time: 44 min  Subjective:   Toshia Barraza states: pt reports less frequent HA's since starting PT, onset of HA's with intermittent left UE radicular symptoms. Pt reports 40% improvement overall.   Pt reports 4/10 left sided UT region pain.   Objective:   Current condition: Fair  Test measurement: cervical spine AROM: flex/ext WNL rotation B 75% min limit w/ left UT pain elicited with left rotation, lateral flex B WNL, NDI 32%, left shoulder flex to 150° before onset of L UT region pain.   NDI score 16/50-32%  Assessment:   Summary of Treatment:     Traction 73558  Traction Type: Cervical  Rx Minutes: 15  Duration: Intermittent  Position: Hook-lying  Weight: 22  Hold: 60  Relax: 10    EXERCISE-29 min     Exercise 2  Exercise Name 2: seated thoracic ext w/ clinician OP  Sets/Reps 2: 10 Exercise 3  Exercise Name 3: seated thoracic ext w/ self OP  Sets/Reps 3: 2/10 Exercise 4  Exercise Name 4: supine pec stretch w/ towel roll  Sets/Reps 4: \" hold Exercise 5  Exercise Name 5: seated cervical extensions   Sets/Reps 5: 2/10 Exercise 6  Exercise Name 6: manual: cervical traction  Sets/Reps 6: 3' total     Progress toward previous goals: Continue STG/LTG, recommend 2- 3 more mechanical tractions attempts. Address scapular strengthening and review thoracic mobility. Would benefit from UT and levator scapula stretching.        Plan:   Goals  ST.  Patient be independent in home exercise program.-MET  2.  Patient will have full cervical pain-free range of motion.-PROGRESSING  3.  Patient will have full left upper extremity shoulder elevation without pain.-PROGRESSING  4.  Patient will maintain cervical and thoracic positioning.-MET  5.  Patient reports minimal headaches.-NOT MET  6.  " Patient will have NDI score of 38% or less-MET  LT.  Patient will report 75-80% overall improvement.PROGESSING  2.  Patient will have NDI score of 30% or less.-PROGRESSING  3.  Patient will report no headaches.-NOT MET  4.  Patient will have minimal trigger point left upper trapezius-MET    Timeframe: 1 month  Prognosis to achieve goals: good    Plan  Treatment plan with rationale: The patient is to  be seen 2 time(s) per week, for 4 week(s) to progress toward short and long term goals.  Joint mobilizations to decrease pain and/or increase ROM   Soft tissue mobilization as therapeutically necessary to decrease pain and/or increase mobility  Therapeutic exercises to increase functional mobility  Perform modalities as therapeutically necessary to decrease pain and increase mobility  Recommendations: Initiate/continue PT services    PT Signature: Rafy Valadez, PT      Based upon review of the patient's progress and continued therapy plan, it is my medical opinion that Toshia Barraza should continue physical therapy treatment at [facility]  [address] [place] [address] Dr Halima ODOM [id number] [phone].    Signature:  LATRICE Elizabeth

## 2018-08-21 ENCOUNTER — OFFICE VISIT (OUTPATIENT)
Dept: FAMILY MEDICINE CLINIC | Facility: CLINIC | Age: 23
End: 2018-08-21

## 2018-08-21 VITALS
HEART RATE: 74 BPM | HEIGHT: 64 IN | WEIGHT: 115 LBS | SYSTOLIC BLOOD PRESSURE: 110 MMHG | DIASTOLIC BLOOD PRESSURE: 62 MMHG | RESPIRATION RATE: 14 BRPM | OXYGEN SATURATION: 98 % | TEMPERATURE: 98.8 F | BODY MASS INDEX: 19.63 KG/M2

## 2018-08-21 DIAGNOSIS — R51.9 NONINTRACTABLE EPISODIC HEADACHE, UNSPECIFIED HEADACHE TYPE: ICD-10-CM
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
## 2018-08-21 DIAGNOSIS — M54.2 NECK PAIN: ICD-10-CM

## 2018-08-21 DIAGNOSIS — M25.511 ACUTE PAIN OF RIGHT SHOULDER: ICD-10-CM

## 2018-08-21 DIAGNOSIS — M25.512 ACUTE PAIN OF LEFT SHOULDER: ICD-10-CM

## 2018-08-21 PROCEDURE — 96372 THER/PROPH/DIAG INJ SC/IM: CPT | Performed by: NURSE PRACTITIONER

## 2018-08-21 PROCEDURE — 99214 OFFICE O/P EST MOD 30 MIN: CPT | Performed by: NURSE PRACTITIONER

## 2018-08-21 RX ORDER — KETOROLAC TROMETHAMINE 30 MG/ML
60 INJECTION, SOLUTION INTRAMUSCULAR; INTRAVENOUS ONCE
Status: COMPLETED | OUTPATIENT
Start: 2018-08-21 | End: 2018-08-21

## 2018-08-21 RX ADMIN — KETOROLAC TROMETHAMINE 60 MG: 30 INJECTION, SOLUTION INTRAMUSCULAR; INTRAVENOUS at 18:15

## 2018-08-22 ENCOUNTER — TELEPHONE (OUTPATIENT)
Dept: FAMILY MEDICINE CLINIC | Facility: CLINIC | Age: 23
End: 2018-08-22

## 2018-08-22 DIAGNOSIS — M54.2 NECK PAIN: Primary | ICD-10-CM

## 2018-08-22 DIAGNOSIS — M25.519 NECK AND SHOULDER PAIN: ICD-10-CM
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
## 2018-08-22 DIAGNOSIS — M54.2 NECK AND SHOULDER PAIN: ICD-10-CM
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
## 2018-09-17 ENCOUNTER — OFFICE VISIT (OUTPATIENT)
Dept: ORTHOPEDIC SURGERY | Facility: CLINIC | Age: 23
End: 2018-09-17

## 2018-09-17 VITALS — HEIGHT: 64 IN | BODY MASS INDEX: 18.95 KG/M2 | WEIGHT: 111 LBS

## 2018-09-17 DIAGNOSIS — M54.2 NECK PAIN: Primary | ICD-10-CM

## 2018-09-17 PROCEDURE — 99203 OFFICE O/P NEW LOW 30 MIN: CPT | Performed by: ORTHOPAEDIC SURGERY
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
## 2018-09-19 ENCOUNTER — OFFICE VISIT (OUTPATIENT)
Dept: FAMILY MEDICINE CLINIC | Facility: CLINIC | Age: 23
End: 2018-09-19

## 2018-09-19 VITALS
DIASTOLIC BLOOD PRESSURE: 70 MMHG | BODY MASS INDEX: 19.09 KG/M2 | TEMPERATURE: 99.1 F | HEIGHT: 64 IN | WEIGHT: 111.8 LBS | SYSTOLIC BLOOD PRESSURE: 100 MMHG | HEART RATE: 71 BPM

## 2018-09-19 DIAGNOSIS — R51.9 ACUTE NONINTRACTABLE HEADACHE, UNSPECIFIED HEADACHE TYPE: ICD-10-CM

## 2018-09-19 DIAGNOSIS — M54.6 ACUTE BILATERAL THORACIC BACK PAIN: ICD-10-CM
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
## 2018-09-19 DIAGNOSIS — M54.2 NECK PAIN: ICD-10-CM

## 2018-09-19 PROCEDURE — 96372 THER/PROPH/DIAG INJ SC/IM: CPT | Performed by: NURSE PRACTITIONER

## 2018-09-19 PROCEDURE — 99213 OFFICE O/P EST LOW 20 MIN: CPT | Performed by: NURSE PRACTITIONER

## 2018-09-19 RX ORDER — KETOROLAC TROMETHAMINE 30 MG/ML
30 INJECTION, SOLUTION INTRAMUSCULAR; INTRAVENOUS ONCE
Status: COMPLETED | OUTPATIENT
Start: 2018-09-19 | End: 2018-09-19

## 2018-09-19 RX ORDER — TRIAMCINOLONE ACETONIDE 40 MG/ML
40 INJECTION, SUSPENSION INTRA-ARTICULAR; INTRAMUSCULAR ONCE
Status: COMPLETED | OUTPATIENT
Start: 2018-09-19 | End: 2018-09-19

## 2018-09-19 RX ADMIN — TRIAMCINOLONE ACETONIDE 40 MG: 40 INJECTION, SUSPENSION INTRA-ARTICULAR; INTRAMUSCULAR at 18:36

## 2018-09-19 RX ADMIN — KETOROLAC TROMETHAMINE 30 MG: 30 INJECTION, SOLUTION INTRAMUSCULAR; INTRAVENOUS at 18:37

## 2018-09-21 ENCOUNTER — HOSPITAL ENCOUNTER (OUTPATIENT)
Dept: MRI IMAGING | Facility: HOSPITAL | Age: 23
Discharge: HOME | End: 2018-09-21
Attending: ORTHOPAEDIC SURGERY

## 2018-09-21 ENCOUNTER — HOSPITAL ENCOUNTER (OUTPATIENT)
Dept: MRI IMAGING | Facility: HOSPITAL | Age: 23
Discharge: HOME | End: 2018-09-21
Attending: ORTHOPAEDIC SURGERY | Admitting: ORTHOPAEDIC SURGERY

## 2018-09-21 DIAGNOSIS — M54.2 NECK PAIN: ICD-10-CM

## 2018-09-21 PROCEDURE — 72141 MRI NECK SPINE W/O DYE: CPT

## 2018-09-21 PROCEDURE — 72146 MRI CHEST SPINE W/O DYE: CPT

## 2018-10-03 PROBLEM — M54.2 NECK PAIN: Status: ACTIVE | Noted: 2018-10-03

## 2018-10-03 PROBLEM — M54.6 ACUTE BILATERAL THORACIC BACK PAIN: Status: ACTIVE | Noted: 2018-10-03
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
## 2018-10-03 PROBLEM — R51.9 ACUTE NONINTRACTABLE HEADACHE: Status: ACTIVE | Noted: 2018-10-03
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
## 2018-10-05 ENCOUNTER — OFFICE VISIT (OUTPATIENT)
Dept: ORTHOPEDIC SURGERY | Facility: CLINIC | Age: 23
End: 2018-10-05

## 2018-10-05 VITALS — BODY MASS INDEX: 19.12 KG/M2 | HEIGHT: 64 IN | WEIGHT: 112 LBS

## 2018-10-05 DIAGNOSIS — M54.6 THORACIC BACK PAIN, UNSPECIFIED BACK PAIN LATERALITY, UNSPECIFIED CHRONICITY: ICD-10-CM

## 2018-10-05 DIAGNOSIS — M54.2 NECK PAIN: Primary | ICD-10-CM

## 2018-10-05 PROCEDURE — 99213 OFFICE O/P EST LOW 20 MIN: CPT | Performed by: ORTHOPAEDIC SURGERY

## 2018-10-05 RX ORDER — LIDOCAINE 50 MG/G
1 PATCH TOPICAL DAILY
Qty: 20 PATCH | Refills: 0 | Status: SHIPPED | OUTPATIENT
Start: 2018-10-05 | End: 2018-11-16 | Stop reason: SDUPTHER
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
## 2018-11-02 ENCOUNTER — OFFICE VISIT (OUTPATIENT)
Dept: FAMILY MEDICINE CLINIC | Facility: CLINIC | Age: 23
End: 2018-11-02

## 2018-11-02 VITALS
BODY MASS INDEX: 19.46 KG/M2 | RESPIRATION RATE: 16 BRPM | WEIGHT: 114 LBS | HEIGHT: 64 IN | DIASTOLIC BLOOD PRESSURE: 52 MMHG | HEART RATE: 76 BPM | OXYGEN SATURATION: 97 % | TEMPERATURE: 99.3 F | SYSTOLIC BLOOD PRESSURE: 100 MMHG

## 2018-11-02 DIAGNOSIS — M54.2 NECK PAIN: ICD-10-CM

## 2018-11-02 DIAGNOSIS — M54.6 ACUTE BILATERAL THORACIC BACK PAIN: ICD-10-CM
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
## 2018-11-02 DIAGNOSIS — R51.9 ACUTE NONINTRACTABLE HEADACHE, UNSPECIFIED HEADACHE TYPE: ICD-10-CM

## 2018-11-02 PROCEDURE — 99214 OFFICE O/P EST MOD 30 MIN: CPT | Performed by: NURSE PRACTITIONER

## 2018-11-02 RX ORDER — AMITRIPTYLINE HYDROCHLORIDE 25 MG/1
25 TABLET, FILM COATED ORAL NIGHTLY
Qty: 30 TABLET | Refills: 0 | Status: SHIPPED | OUTPATIENT
Start: 2018-11-02 | End: 2018-11-16 | Stop reason: SDUPTHER
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
## 2018-11-05 ENCOUNTER — TELEPHONE (OUTPATIENT)
Dept: ORTHOPEDIC SURGERY | Facility: CLINIC | Age: 23
End: 2018-11-05

## 2018-11-05 ENCOUNTER — TELEPHONE (OUTPATIENT)
Dept: FAMILY MEDICINE CLINIC | Facility: CLINIC | Age: 23
End: 2018-11-05

## 2018-11-05 DIAGNOSIS — M54.6 ACUTE LEFT-SIDED THORACIC BACK PAIN: ICD-10-CM

## 2018-11-05 RX ORDER — METHOCARBAMOL 500 MG/1
500 TABLET, FILM COATED ORAL EVERY 6 HOURS PRN
Qty: 90 TABLET | Refills: 1 | Status: SHIPPED | OUTPATIENT
Start: 2018-11-05 | End: 2018-11-16 | Stop reason: SDUPTHER

## 2018-11-05 RX ORDER — IBUPROFEN 800 MG/1
800 TABLET ORAL EVERY 8 HOURS PRN
Qty: 90 TABLET | Refills: 1 | Status: SHIPPED | OUTPATIENT
Start: 2018-11-05 | End: 2018-11-16 | Stop reason: SDUPTHER

## 2018-11-16 ENCOUNTER — OFFICE VISIT (OUTPATIENT)
Dept: FAMILY MEDICINE CLINIC | Facility: CLINIC | Age: 23
End: 2018-11-16

## 2018-11-16 VITALS
WEIGHT: 114 LBS | DIASTOLIC BLOOD PRESSURE: 60 MMHG | SYSTOLIC BLOOD PRESSURE: 100 MMHG | OXYGEN SATURATION: 97 % | HEIGHT: 64 IN | BODY MASS INDEX: 19.46 KG/M2 | TEMPERATURE: 98.6 F | RESPIRATION RATE: 12 BRPM | HEART RATE: 76 BPM

## 2018-11-16 DIAGNOSIS — R51.9 ACUTE NONINTRACTABLE HEADACHE, UNSPECIFIED HEADACHE TYPE: ICD-10-CM
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
## 2018-11-16 DIAGNOSIS — M54.2 NECK PAIN: ICD-10-CM

## 2018-11-16 DIAGNOSIS — M54.6 ACUTE LEFT-SIDED THORACIC BACK PAIN: ICD-10-CM

## 2018-11-16 PROCEDURE — 99213 OFFICE O/P EST LOW 20 MIN: CPT | Performed by: NURSE PRACTITIONER

## 2018-11-16 RX ORDER — SUMATRIPTAN 25 MG/1
TABLET, FILM COATED ORAL
Qty: 20 TABLET | Refills: 3 | Status: SHIPPED | OUTPATIENT
Start: 2018-11-16 | End: 2019-02-21 | Stop reason: HOSPADM

## 2018-11-16 RX ORDER — IBUPROFEN 800 MG/1
800 TABLET ORAL EVERY 8 HOURS PRN
Qty: 90 TABLET | Refills: 3 | Status: SHIPPED | OUTPATIENT
Start: 2018-11-16 | End: 2019-02-21 | Stop reason: HOSPADM

## 2018-11-16 RX ORDER — METHOCARBAMOL 500 MG/1
500 TABLET, FILM COATED ORAL EVERY 6 HOURS PRN
Qty: 90 TABLET | Refills: 3 | Status: SHIPPED | OUTPATIENT
Start: 2018-11-16 | End: 2019-02-21 | Stop reason: HOSPADM

## 2018-11-16 RX ORDER — LIDOCAINE 50 MG/G
1 PATCH TOPICAL DAILY
Qty: 20 PATCH | Refills: 3 | Status: SHIPPED | OUTPATIENT
Start: 2018-11-16 | End: 2019-02-21 | Stop reason: HOSPADM

## 2018-11-16 RX ORDER — AMITRIPTYLINE HYDROCHLORIDE 25 MG/1
TABLET, FILM COATED ORAL
Qty: 60 TABLET | Refills: 3 | Status: SHIPPED | OUTPATIENT
Start: 2018-11-16 | End: 2019-02-21 | Stop reason: HOSPADM
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
## 2019-02-15 ENCOUNTER — OFFICE VISIT (OUTPATIENT)
Dept: FAMILY MEDICINE CLINIC | Facility: CLINIC | Age: 24
End: 2019-02-15

## 2019-02-15 VITALS
HEIGHT: 64 IN | HEART RATE: 88 BPM | SYSTOLIC BLOOD PRESSURE: 110 MMHG | TEMPERATURE: 97.8 F | WEIGHT: 115 LBS | DIASTOLIC BLOOD PRESSURE: 70 MMHG | RESPIRATION RATE: 16 BRPM | OXYGEN SATURATION: 99 % | BODY MASS INDEX: 19.63 KG/M2

## 2019-02-15 DIAGNOSIS — G89.29 CHRONIC BILATERAL LOW BACK PAIN WITH BILATERAL SCIATICA: ICD-10-CM

## 2019-02-15 DIAGNOSIS — M54.42 CHRONIC BILATERAL LOW BACK PAIN WITH BILATERAL SCIATICA: ICD-10-CM

## 2019-02-15 DIAGNOSIS — M53.3 SACRAL BACK PAIN: ICD-10-CM
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
## 2019-02-15 DIAGNOSIS — M54.41 CHRONIC BILATERAL LOW BACK PAIN WITH BILATERAL SCIATICA: ICD-10-CM

## 2019-02-15 PROCEDURE — 99214 OFFICE O/P EST MOD 30 MIN: CPT | Performed by: NURSE PRACTITIONER

## 2019-02-15 RX ORDER — BUTALBITAL, ACETAMINOPHEN AND CAFFEINE 300; 40; 50 MG/1; MG/1; MG/1
CAPSULE ORAL
Qty: 60 CAPSULE | Refills: 0 | Status: SHIPPED | OUTPATIENT
Start: 2019-02-15 | End: 2019-02-21 | Stop reason: HOSPADM

## 2019-02-18 ENCOUNTER — TELEPHONE (OUTPATIENT)
Dept: FAMILY MEDICINE CLINIC | Facility: CLINIC | Age: 24
End: 2019-02-18
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
## 2019-02-18 NOTE — TELEPHONE ENCOUNTER
Called and spoke with pt relayed results and instructions advised pt that when the MRI is approved they will call her to schedule the appt

## 2019-02-18 NOTE — TELEPHONE ENCOUNTER
----- Message from LATRICE Elizabeth sent at 2/16/2019  9:09 PM CST -----  Xray of sacral neg. Will order mri.

## 2019-02-21 ENCOUNTER — OFFICE VISIT (OUTPATIENT)
Dept: OBSTETRICS AND GYNECOLOGY | Facility: CLINIC | Age: 24
End: 2019-02-21

## 2019-02-21 ENCOUNTER — LAB (OUTPATIENT)
Dept: LAB | Facility: HOSPITAL | Age: 24
End: 2019-02-21

## 2019-02-21 VITALS
HEIGHT: 64 IN | DIASTOLIC BLOOD PRESSURE: 80 MMHG | WEIGHT: 117 LBS | SYSTOLIC BLOOD PRESSURE: 130 MMHG | BODY MASS INDEX: 19.97 KG/M2

## 2019-02-21 DIAGNOSIS — R53.1 WEAKNESS: Primary | ICD-10-CM

## 2019-02-21 DIAGNOSIS — R11.0 NAUSEA: ICD-10-CM

## 2019-02-21 DIAGNOSIS — Z80.41 FAMILY HISTORY OF OVARIAN CANCER: ICD-10-CM

## 2019-02-21 DIAGNOSIS — R10.32 COLICKY LLQ ABDOMINAL PAIN: ICD-10-CM

## 2019-02-21 DIAGNOSIS — R10.2 PELVIC PAIN: ICD-10-CM

## 2019-02-21 DIAGNOSIS — K59.00 CONSTIPATION, UNSPECIFIED CONSTIPATION TYPE: ICD-10-CM

## 2019-02-21 DIAGNOSIS — R53.1 WEAKNESS: ICD-10-CM

## 2019-02-21 LAB
ALBUMIN SERPL-MCNC: 4.7 G/DL (ref 3.4–4.8)
ALBUMIN/GLOB SERPL: 1.7 G/DL (ref 1.1–1.8)
ALP SERPL-CCNC: 61 U/L (ref 38–126)
ALT SERPL W P-5'-P-CCNC: 20 U/L (ref 9–52)
AMYLASE SERPL-CCNC: 62 U/L (ref 50–130)
ANION GAP SERPL CALCULATED.3IONS-SCNC: 8 MMOL/L (ref 5–15)
AST SERPL-CCNC: 19 U/L (ref 14–36)
BILIRUB SERPL-MCNC: 0.4 MG/DL (ref 0.2–1.3)
BILIRUB UR QL STRIP: NEGATIVE
BUN BLD-MCNC: 10 MG/DL (ref 7–21)
BUN/CREAT SERPL: 12.7 (ref 7–25)
CALCIUM SPEC-SCNC: 9.4 MG/DL (ref 8.4–10.2)
CANDIDA ALBICANS: NEGATIVE
CHLORIDE SERPL-SCNC: 100 MMOL/L (ref 95–110)
CLARITY UR: ABNORMAL
CO2 SERPL-SCNC: 27 MMOL/L (ref 22–31)
COLOR UR: YELLOW
CREAT BLD-MCNC: 0.79 MG/DL (ref 0.5–1)
DEPRECATED RDW RBC AUTO: 38.5 FL (ref 37–54)
ERYTHROCYTE [DISTWIDTH] IN BLOOD BY AUTOMATED COUNT: 12 % (ref 12.3–15.4)
GARDNERELLA VAGINALIS: POSITIVE
GFR SERPL CREATININE-BSD FRML MDRD: 91 ML/MIN/1.73 (ref 71–165)
GLOBULIN UR ELPH-MCNC: 2.7 GM/DL (ref 2.3–3.5)
GLUCOSE BLD-MCNC: 78 MG/DL (ref 60–100)
GLUCOSE UR STRIP-MCNC: NEGATIVE MG/DL
HCT VFR BLD AUTO: 37.6 % (ref 34–46.6)
HGB BLD-MCNC: 12.9 G/DL (ref 12–15.9)
HGB UR QL STRIP.AUTO: NEGATIVE
KETONES UR QL STRIP: NEGATIVE
LEUKOCYTE ESTERASE UR QL STRIP.AUTO: NEGATIVE
LIPASE SERPL-CCNC: 69 U/L (ref 23–300)
MCH RBC QN AUTO: 29.7 PG (ref 26.6–33)
MCHC RBC AUTO-ENTMCNC: 34.3 G/DL (ref 31.5–35.7)
MCV RBC AUTO: 86.6 FL (ref 79–97)
NITRITE UR QL STRIP: NEGATIVE
PH UR STRIP.AUTO: 7 [PH] (ref 5–9)
PLATELET # BLD AUTO: 244 10*3/MM3 (ref 140–450)
PMV BLD AUTO: 11.7 FL (ref 6–12)
POTASSIUM BLD-SCNC: 3.7 MMOL/L (ref 3.5–5.1)
PROT SERPL-MCNC: 7.4 G/DL (ref 6.3–8.6)
PROT UR QL STRIP: NEGATIVE
RBC # BLD AUTO: 4.34 10*6/MM3 (ref 3.77–5.28)
SODIUM BLD-SCNC: 135 MMOL/L (ref 137–145)
SP GR UR STRIP: 1.02 (ref 1–1.03)
T4 FREE SERPL-MCNC: 1.09 NG/DL (ref 0.78–2.19)
TRICHOMONAS VAGINALIS PCR: NEGATIVE
TSH SERPL DL<=0.05 MIU/L-ACNC: 1.31 MIU/ML (ref 0.46–4.68)
UROBILINOGEN UR QL STRIP: ABNORMAL
WBC NRBC COR # BLD: 7.66 10*3/MM3 (ref 3.4–10.8)

## 2019-02-21 PROCEDURE — 80053 COMPREHEN METABOLIC PANEL: CPT | Performed by: NURSE PRACTITIONER

## 2019-02-21 PROCEDURE — 36415 COLL VENOUS BLD VENIPUNCTURE: CPT | Performed by: NURSE PRACTITIONER

## 2019-02-21 PROCEDURE — 87591 N.GONORRHOEAE DNA AMP PROB: CPT | Performed by: NURSE PRACTITIONER

## 2019-02-21 PROCEDURE — 99214 OFFICE O/P EST MOD 30 MIN: CPT | Performed by: NURSE PRACTITIONER

## 2019-02-21 PROCEDURE — 87491 CHLMYD TRACH DNA AMP PROBE: CPT | Performed by: NURSE PRACTITIONER

## 2019-02-21 PROCEDURE — 84443 ASSAY THYROID STIM HORMONE: CPT | Performed by: NURSE PRACTITIONER

## 2019-02-21 PROCEDURE — 87480 CANDIDA DNA DIR PROBE: CPT | Performed by: NURSE PRACTITIONER

## 2019-02-21 PROCEDURE — 85027 COMPLETE CBC AUTOMATED: CPT

## 2019-02-21 PROCEDURE — 87661 TRICHOMONAS VAGINALIS AMPLIF: CPT | Performed by: NURSE PRACTITIONER

## 2019-02-21 PROCEDURE — 81003 URINALYSIS AUTO W/O SCOPE: CPT | Performed by: NURSE PRACTITIONER

## 2019-02-21 PROCEDURE — 82150 ASSAY OF AMYLASE: CPT | Performed by: NURSE PRACTITIONER

## 2019-02-21 PROCEDURE — 83690 ASSAY OF LIPASE: CPT | Performed by: NURSE PRACTITIONER

## 2019-02-21 PROCEDURE — 87660 TRICHOMONAS VAGIN DIR PROBE: CPT | Performed by: NURSE PRACTITIONER

## 2019-02-21 PROCEDURE — 84439 ASSAY OF FREE THYROXINE: CPT | Performed by: NURSE PRACTITIONER

## 2019-02-21 PROCEDURE — 87510 GARDNER VAG DNA DIR PROBE: CPT | Performed by: NURSE PRACTITIONER

## 2019-02-22 LAB
C TRACH RRNA CVX QL NAA+PROBE: NEGATIVE
N GONORRHOEA RRNA SPEC QL NAA+PROBE: NEGATIVE
TRICHOMONAS VAGINALIS PCR: NEGATIVE

## 2019-02-25 ENCOUNTER — HOSPITAL ENCOUNTER (OUTPATIENT)
Dept: MRI IMAGING | Facility: HOSPITAL | Age: 24
Discharge: HOME | End: 2019-02-25
Admitting: NURSE PRACTITIONER

## 2019-02-25 PROCEDURE — 72148 MRI LUMBAR SPINE W/O DYE: CPT

## 2019-02-25 RX ORDER — CLINDAMYCIN HYDROCHLORIDE 300 MG/1
300 CAPSULE ORAL 2 TIMES DAILY WITH MEALS
Qty: 14 CAPSULE | Refills: 0 | Status: SHIPPED | OUTPATIENT
Start: 2019-02-25 | End: 2019-03-12

## 2019-02-26 ENCOUNTER — TELEPHONE (OUTPATIENT)
Dept: FAMILY MEDICINE CLINIC | Facility: CLINIC | Age: 24
End: 2019-02-26

## 2019-03-01 ENCOUNTER — OFFICE VISIT (OUTPATIENT)
Dept: FAMILY MEDICINE CLINIC | Facility: CLINIC | Age: 24
End: 2019-03-01

## 2019-03-01 VITALS
DIASTOLIC BLOOD PRESSURE: 60 MMHG | HEART RATE: 67 BPM | RESPIRATION RATE: 16 BRPM | WEIGHT: 116 LBS | TEMPERATURE: 98.5 F | HEIGHT: 64 IN | BODY MASS INDEX: 19.81 KG/M2 | OXYGEN SATURATION: 97 % | SYSTOLIC BLOOD PRESSURE: 100 MMHG

## 2019-03-01 DIAGNOSIS — M54.6 ACUTE BILATERAL THORACIC BACK PAIN: ICD-10-CM
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
## 2019-03-01 DIAGNOSIS — R51.9 ACUTE NONINTRACTABLE HEADACHE, UNSPECIFIED HEADACHE TYPE: ICD-10-CM

## 2019-03-01 DIAGNOSIS — M54.50 ACUTE BILATERAL LOW BACK PAIN WITHOUT SCIATICA: ICD-10-CM

## 2019-03-01 DIAGNOSIS — M54.2 NECK PAIN: ICD-10-CM

## 2019-03-01 PROCEDURE — 99214 OFFICE O/P EST MOD 30 MIN: CPT | Performed by: NURSE PRACTITIONER

## 2019-03-01 PROCEDURE — 96372 THER/PROPH/DIAG INJ SC/IM: CPT | Performed by: NURSE PRACTITIONER

## 2019-03-01 RX ORDER — TRIAMCINOLONE ACETONIDE 40 MG/ML
40 INJECTION, SUSPENSION INTRA-ARTICULAR; INTRAMUSCULAR ONCE
Status: COMPLETED | OUTPATIENT
Start: 2019-03-01 | End: 2019-03-01

## 2019-03-01 RX ORDER — KETOROLAC TROMETHAMINE 30 MG/ML
60 INJECTION, SOLUTION INTRAMUSCULAR; INTRAVENOUS ONCE
Status: COMPLETED | OUTPATIENT
Start: 2019-03-01 | End: 2019-03-01

## 2019-03-01 RX ORDER — BUTALBITAL, ACETAMINOPHEN AND CAFFEINE 300; 40; 50 MG/1; MG/1; MG/1
CAPSULE ORAL
Refills: 0 | COMMUNITY
Start: 2019-02-22 | End: 2019-03-15

## 2019-03-01 RX ADMIN — KETOROLAC TROMETHAMINE 60 MG: 30 INJECTION, SOLUTION INTRAMUSCULAR; INTRAVENOUS at 14:30

## 2019-03-01 RX ADMIN — TRIAMCINOLONE ACETONIDE 40 MG: 40 INJECTION, SUSPENSION INTRA-ARTICULAR; INTRAMUSCULAR at 14:29
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
## 2019-03-01 NOTE — TELEPHONE ENCOUNTER
----- Message from LATRICE Elizabeth sent at 2/26/2019  8:30 AM CST -----  MRI is negative but ortho saw something on last one so I'd f/u with them to look at the images to cover all our bases

## 2019-03-05 ENCOUNTER — OFFICE VISIT (OUTPATIENT)
Dept: FAMILY MEDICINE CLINIC | Facility: CLINIC | Age: 24
End: 2019-03-05

## 2019-03-05 VITALS
HEART RATE: 94 BPM | OXYGEN SATURATION: 99 % | RESPIRATION RATE: 14 BRPM | SYSTOLIC BLOOD PRESSURE: 110 MMHG | WEIGHT: 119 LBS | DIASTOLIC BLOOD PRESSURE: 70 MMHG | TEMPERATURE: 97.3 F | BODY MASS INDEX: 20.32 KG/M2 | HEIGHT: 64 IN

## 2019-03-05 DIAGNOSIS — Z82.49 FAMILY HISTORY OF HEART DISEASE: ICD-10-CM

## 2019-03-05 DIAGNOSIS — R53.83 OTHER FATIGUE: ICD-10-CM

## 2019-03-05 DIAGNOSIS — Z13.220 SCREENING FOR LIPID DISORDERS: ICD-10-CM

## 2019-03-05 DIAGNOSIS — R63.5 RECENT WEIGHT GAIN: ICD-10-CM

## 2019-03-05 DIAGNOSIS — R59.9 ENLARGED LYMPH NODE: ICD-10-CM

## 2019-03-05 DIAGNOSIS — F41.9 ANXIOUSNESS: ICD-10-CM

## 2019-03-05 DIAGNOSIS — R22.1 PALPABLE MASS OF NECK: Primary | ICD-10-CM

## 2019-03-05 DIAGNOSIS — R11.0 NAUSEA: ICD-10-CM

## 2019-03-05 DIAGNOSIS — R00.0 RACING HEART BEAT: ICD-10-CM

## 2019-03-05 DIAGNOSIS — R42 DIZZINESS: ICD-10-CM

## 2019-03-05 PROCEDURE — 99214 OFFICE O/P EST MOD 30 MIN: CPT | Performed by: NURSE PRACTITIONER

## 2019-03-05 PROCEDURE — 93000 ELECTROCARDIOGRAM COMPLETE: CPT | Performed by: NURSE PRACTITIONER

## 2019-03-05 RX ORDER — TOPIRAMATE 50 MG/1
TABLET, FILM COATED ORAL
Qty: 70 TABLET | Refills: 0 | Status: SHIPPED | OUTPATIENT
Start: 2019-03-05 | End: 2019-09-04

## 2019-03-05 RX ORDER — MECLIZINE HCL 12.5 MG/1
12.5 TABLET ORAL 3 TIMES DAILY PRN
Qty: 60 TABLET | Refills: 0 | Status: SHIPPED | OUTPATIENT
Start: 2019-03-05 | End: 2019-09-04

## 2019-03-05 RX ORDER — ONDANSETRON 4 MG/1
4 TABLET, ORALLY DISINTEGRATING ORAL EVERY 8 HOURS PRN
Qty: 30 TABLET | Refills: 0 | Status: SHIPPED | OUTPATIENT
Start: 2019-03-05 | End: 2019-09-04
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
## 2019-03-07 ENCOUNTER — APPOINTMENT (OUTPATIENT)
Dept: LAB | Facility: HOSPITAL | Age: 24
End: 2019-03-07

## 2019-03-07 ENCOUNTER — OFFICE VISIT (OUTPATIENT)
Dept: OBSTETRICS AND GYNECOLOGY | Facility: CLINIC | Age: 24
End: 2019-03-07

## 2019-03-07 VITALS
HEIGHT: 64 IN | SYSTOLIC BLOOD PRESSURE: 136 MMHG | DIASTOLIC BLOOD PRESSURE: 76 MMHG | BODY MASS INDEX: 20.32 KG/M2 | WEIGHT: 119 LBS

## 2019-03-07 DIAGNOSIS — K59.09 CHRONIC CONSTIPATION: ICD-10-CM

## 2019-03-07 DIAGNOSIS — R10.33 PERIUMBILICAL ABDOMINAL PAIN: ICD-10-CM

## 2019-03-07 DIAGNOSIS — E04.1 CYST OF THYROID DETERMINED BY ULTRASOUND: Primary | ICD-10-CM

## 2019-03-07 DIAGNOSIS — R63.0 DECREASED APPETITE: ICD-10-CM

## 2019-03-07 DIAGNOSIS — R53.83 OTHER FATIGUE: ICD-10-CM

## 2019-03-07 DIAGNOSIS — R11.0 NAUSEA: ICD-10-CM

## 2019-03-07 LAB — T3 SERPL-MCNC: 126 NG/DL (ref 97–169)

## 2019-03-07 PROCEDURE — 86376 MICROSOMAL ANTIBODY EACH: CPT | Performed by: NURSE PRACTITIONER

## 2019-03-07 PROCEDURE — 86800 THYROGLOBULIN ANTIBODY: CPT | Performed by: NURSE PRACTITIONER

## 2019-03-07 PROCEDURE — 84480 ASSAY TRIIODOTHYRONINE (T3): CPT | Performed by: NURSE PRACTITIONER

## 2019-03-07 PROCEDURE — 36415 COLL VENOUS BLD VENIPUNCTURE: CPT | Performed by: NURSE PRACTITIONER

## 2019-03-07 PROCEDURE — 84479 ASSAY OF THYROID (T3 OR T4): CPT | Performed by: NURSE PRACTITIONER

## 2019-03-08 LAB
T3RU NFR SERPL: 27 % (ref 24–39)
THYROGLOB AB SERPL-ACNC: <1 IU/ML (ref 0–0.9)
THYROPEROXIDASE AB SERPL-ACNC: 9 IU/ML (ref 0–34)

## 2019-03-09 NOTE — PROGRESS NOTES
"Subjective   Chief Complaint   Patient presents with   • Dysmenorrhea     Pain LLQ   • Vaginitis     Toshia Barraza is a 24 y.o. year old  presenting to be seen because of painful menses with pelvic pain/fullness that started about 2months ago.  Her mother had ovarian CA and the pt feels bloated all the time so worries about this.  [place] has become painful more often than not.  Her constipation has become worse and she feels like her abdomen left periumbilical area is firm. Current birth control method: none.     Patient's last menstrual period was 2019 (approximate).    Past 6 month menstrual history:    Cycle Frequency: regular, predictable and consistent every 28 - 32 days   Menstrual cycle character: flow is typically normal   Cycle Duration: 6 - 8   Number of heavy days of flows: 3   Dysmenorrhea: moderate and affecting her activities of daily living   PMS: moderate and is not affecting her activities of daily living   Intermenstrual bleeding present: {no   Post-coital bleeding present: sometimes       The following portions of the patient's history were reviewed and updated as appropriate:problem list, current medications, allergies, past family history, past medical history, past social history and past surgical history    Social History    Tobacco Use      Smoking status: Never Smoker      Smokeless tobacco: Never Used    Review of Systems   Constitutional: Positive for appetite change and fatigue.   Gastrointestinal: Positive for abdominal pain, constipation and nausea.   Genitourinary: Positive for dyspareunia and pelvic pain.   Neurological: Positive for weakness.   All other systems reviewed and are negative.        Objective   /80   Ht 162.6 cm (64\")   Wt 53.1 kg (117 lb)   LMP 2019 (Approximate)   BMI 20.08 kg/m²     General:  well developed; well nourished  no acute distress   Skin:  No suspicious lesions seen   Thyroid: normal to inspection and palpation "   Lungs:  breathing is unlabored   Heart:  regular rate and rhythm   Breasts:  Examined in supine position  Symmetric without masses or skin dimpling  Nipples normal without inversion, lesions or discharge  There are no palpable axillary nodes   Abdomen: no umbilical or inguinal hernias are present  no hepato-splenomegaly  Abnormal findings: moderate tenderness in the LLQ   Pelvis: External genitalia:  normal appearance of the external genitalia including Bartholin's and [place]'s glands.  :  urethral meatus normal;  Vaginal:  normal pink mucosa without prolapse or lesions. discharge present -  yellow;  Cervix:  normal appearance.  Uterus:  normal size, shape and consistency.  Adnexa:  tenderness of the left adnexa to  deep palpation;     Lab Review   No data reviewed    Imaging   Pelvic ultrasound report           No orders of the defined types were placed in this encounter.      Diagnoses and all orders for this visit:    Weakness  -     CBC (No Diff); Future  -     Comprehensive Metabolic Panel  -     T4, Free  -     TSH    [place] LLQ abdominal pain  -     Amylase  -     Lipase    Constipation, unspecified constipation type  -     Amylase  -     Lipase    Nausea  -     Amylase  -     Lipase    Family history of ovarian cancer  -     US Non-ob Transvaginal; Future    Pelvic pain  -     Gardnerella vaginalis, Trichomonas vaginalis, Candida albicans, PCR - Swab, Vagina  -     T4, Free  -     TSH  -     Chlamydia trachomatis, Neisseria gonorrhoeae, Trichomonas vaginalis, PCR - Swab, Vagina  -     Urinalysis With Culture If Indicated - Urine, Clean Catch            This note was electronically signed.    Zoe Erazo CNM

## 2019-03-11 ENCOUNTER — LAB (OUTPATIENT)
Dept: LAB | Facility: OTHER | Age: 24
End: 2019-03-11

## 2019-03-11 DIAGNOSIS — R53.83 OTHER FATIGUE: ICD-10-CM

## 2019-03-11 DIAGNOSIS — R11.0 NAUSEA: ICD-10-CM

## 2019-03-11 DIAGNOSIS — Z82.49 FAMILY HISTORY OF HEART DISEASE: ICD-10-CM

## 2019-03-11 DIAGNOSIS — R63.5 RECENT WEIGHT GAIN: ICD-10-CM

## 2019-03-11 DIAGNOSIS — R59.9 ENLARGED LYMPH NODE: ICD-10-CM

## 2019-03-11 DIAGNOSIS — Z13.220 SCREENING FOR LIPID DISORDERS: ICD-10-CM

## 2019-03-11 DIAGNOSIS — R00.0 RACING HEART BEAT: ICD-10-CM

## 2019-03-11 LAB
ALBUMIN SERPL-MCNC: 4.7 G/DL (ref 3.5–5)
ALBUMIN/GLOB SERPL: 1.7 G/DL (ref 1.1–1.8)
ALP SERPL-CCNC: 58 U/L (ref 38–126)
ALT SERPL W P-5'-P-CCNC: 17 U/L
ANION GAP SERPL CALCULATED.3IONS-SCNC: 13 MMOL/L (ref 5–15)
AST SERPL-CCNC: 16 U/L (ref 14–36)
BASOPHILS # BLD AUTO: 0.01 10*3/MM3 (ref 0–0.2)
BASOPHILS NFR BLD AUTO: 0.1 % (ref 0–2)
BILIRUB SERPL-MCNC: 0.5 MG/DL (ref 0.2–1.3)
BUN BLD-MCNC: 13 MG/DL (ref 7–17)
BUN/CREAT SERPL: 20.3 (ref 7–25)
CALCIUM SPEC-SCNC: 8.5 MG/DL (ref 8.4–10.2)
CHLORIDE SERPL-SCNC: 105 MMOL/L (ref 98–107)
CHOLEST SERPL-MCNC: 133 MG/DL (ref 150–200)
CO2 SERPL-SCNC: 20 MMOL/L (ref 22–30)
CREAT BLD-MCNC: 0.64 MG/DL (ref 0.52–1.04)
DEPRECATED RDW RBC AUTO: 38.7 FL (ref 36.4–46.3)
EOSINOPHIL # BLD AUTO: 0.18 10*3/MM3 (ref 0–0.7)
EOSINOPHIL NFR BLD AUTO: 2.5 % (ref 0–7)
ERYTHROCYTE [DISTWIDTH] IN BLOOD BY AUTOMATED COUNT: 12.2 % (ref 11.5–14.5)
GFR SERPL CREATININE-BSD FRML MDRD: 116 ML/MIN/1.73 (ref 71–165)
GLOBULIN UR ELPH-MCNC: 2.7 GM/DL (ref 2.3–3.5)
GLUCOSE BLD-MCNC: 91 MG/DL (ref 74–99)
HCT VFR BLD AUTO: 38.7 % (ref 35–45)
HDLC SERPL-MCNC: 76 MG/DL (ref 40–59)
HGB BLD-MCNC: 13 G/DL (ref 12–15.5)
LDLC SERPL CALC-MCNC: 44 MG/DL
LDLC/HDLC SERPL: 0.58 {RATIO} (ref 0–3.22)
LYMPHOCYTES # BLD AUTO: 1.72 10*3/MM3 (ref 0.6–4.2)
LYMPHOCYTES NFR BLD AUTO: 23.4 % (ref 10–50)
MCH RBC QN AUTO: 29.7 PG (ref 26.5–34)
MCHC RBC AUTO-ENTMCNC: 33.6 G/DL (ref 31.4–36)
MCV RBC AUTO: 88.4 FL (ref 80–98)
MONOCYTES # BLD AUTO: 0.52 10*3/MM3 (ref 0–0.9)
MONOCYTES NFR BLD AUTO: 7.1 % (ref 0–12)
NEUTROPHILS # BLD AUTO: 4.91 10*3/MM3 (ref 2–8.6)
NEUTROPHILS NFR BLD AUTO: 66.9 % (ref 37–80)
PLATELET # BLD AUTO: 200 10*3/MM3 (ref 150–450)
PMV BLD AUTO: 11.3 FL (ref 8–12)
POTASSIUM BLD-SCNC: 3.8 MMOL/L (ref 3.4–5)
PROT SERPL-MCNC: 7.4 G/DL (ref 6.3–8.2)
RBC # BLD AUTO: 4.38 10*6/MM3 (ref 3.77–5.16)
SODIUM BLD-SCNC: 138 MMOL/L (ref 137–145)
TRIGL SERPL-MCNC: 64 MG/DL
VLDLC SERPL-MCNC: 12.8 MG/DL
WBC NRBC COR # BLD: 7.34 10*3/MM3 (ref 3.2–9.8)

## 2019-03-11 PROCEDURE — 80061 LIPID PANEL: CPT | Performed by: NURSE PRACTITIONER

## 2019-03-11 PROCEDURE — 36415 COLL VENOUS BLD VENIPUNCTURE: CPT | Performed by: NURSE PRACTITIONER

## 2019-03-11 PROCEDURE — 82607 VITAMIN B-12: CPT | Performed by: NURSE PRACTITIONER

## 2019-03-11 PROCEDURE — 85025 COMPLETE CBC W/AUTO DIFF WBC: CPT | Performed by: NURSE PRACTITIONER

## 2019-03-11 PROCEDURE — 80053 COMPREHEN METABOLIC PANEL: CPT | Performed by: NURSE PRACTITIONER

## 2019-03-12 ENCOUNTER — OFFICE VISIT (OUTPATIENT)
Dept: GASTROENTEROLOGY | Facility: CLINIC | Age: 24
End: 2019-03-12

## 2019-03-12 VITALS
DIASTOLIC BLOOD PRESSURE: 64 MMHG | SYSTOLIC BLOOD PRESSURE: 110 MMHG | HEIGHT: 64 IN | OXYGEN SATURATION: 100 % | BODY MASS INDEX: 20.14 KG/M2 | HEART RATE: 79 BPM | WEIGHT: 118 LBS

## 2019-03-12 DIAGNOSIS — R68.81 EARLY SATIETY: ICD-10-CM

## 2019-03-12 DIAGNOSIS — R10.84 GENERALIZED ABDOMINAL PAIN: ICD-10-CM

## 2019-03-12 DIAGNOSIS — R19.4 CHANGE IN BOWEL HABITS: ICD-10-CM

## 2019-03-12 DIAGNOSIS — R11.0 NAUSEA: Primary | ICD-10-CM

## 2019-03-12 LAB
BH CV ECHO MEAS - % IVS THICK: -11.2 %
BH CV ECHO MEAS - % LVPW THICK: 33.4 %
BH CV ECHO MEAS - ACS: 1.8 CM
BH CV ECHO MEAS - AO MAX PG (FULL): 0 MMHG
BH CV ECHO MEAS - AO MAX PG: 4 MMHG
BH CV ECHO MEAS - AO MEAN PG (FULL): 0 MMHG
BH CV ECHO MEAS - AO MEAN PG: 2 MMHG
BH CV ECHO MEAS - AO ROOT AREA (BSA CORRECTED): 1.7
BH CV ECHO MEAS - AO ROOT AREA: 5.5 CM^2
BH CV ECHO MEAS - AO ROOT DIAM: 2.7 CM
BH CV ECHO MEAS - AO V2 MAX: 100 CM/SEC
BH CV ECHO MEAS - AO V2 MEAN: 67.7 CM/SEC
BH CV ECHO MEAS - AO V2 VTI: 20.1 CM
BH CV ECHO MEAS - AVA(I,A): 3 CM^2
BH CV ECHO MEAS - AVA(I,D): 3 CM^2
BH CV ECHO MEAS - AVA(V,A): 2.8 CM^2
BH CV ECHO MEAS - AVA(V,D): 2.8 CM^2
BH CV ECHO MEAS - BSA(HAYCOCK): 1.6 M^2
BH CV ECHO MEAS - BSA: 1.6 M^2
BH CV ECHO MEAS - BZI_BMI: 20.3 KILOGRAMS/M^2
BH CV ECHO MEAS - BZI_METRIC_HEIGHT: 162.6 CM
BH CV ECHO MEAS - BZI_METRIC_WEIGHT: 53.5 KG
BH CV ECHO MEAS - EDV(CUBED): 41.8 ML
BH CV ECHO MEAS - EDV(TEICH): 49.8 ML
BH CV ECHO MEAS - EF(CUBED): 72 %
BH CV ECHO MEAS - EF(TEICH): 64.8 %
BH CV ECHO MEAS - ESV(CUBED): 11.7 ML
BH CV ECHO MEAS - ESV(TEICH): 17.5 ML
BH CV ECHO MEAS - FS: 34.6 %
BH CV ECHO MEAS - IVS/LVPW: 1.1
BH CV ECHO MEAS - IVSD: 0.83 CM
BH CV ECHO MEAS - IVSS: 0.74 CM
BH CV ECHO MEAS - LA DIMENSION: 3 CM
BH CV ECHO MEAS - LA/AO: 1.1
BH CV ECHO MEAS - LV MASS(C)D: 72.2 GRAMS
BH CV ECHO MEAS - LV MASS(C)DI: 46.2 GRAMS/M^2
BH CV ECHO MEAS - LV MASS(C)S: 43.7 GRAMS
BH CV ECHO MEAS - LV MASS(C)SI: 28 GRAMS/M^2
BH CV ECHO MEAS - LV MAX PG: 4 MMHG
BH CV ECHO MEAS - LV MEAN PG: 2 MMHG
BH CV ECHO MEAS - LV V1 MAX: 100 CM/SEC
BH CV ECHO MEAS - LV V1 MEAN: 70.1 CM/SEC
BH CV ECHO MEAS - LV V1 VTI: 21.5 CM
BH CV ECHO MEAS - LVIDD: 3.5 CM
BH CV ECHO MEAS - LVIDS: 2.3 CM
BH CV ECHO MEAS - LVOT AREA (M): 2.8 CM^2
BH CV ECHO MEAS - LVOT AREA: 2.8 CM^2
BH CV ECHO MEAS - LVOT DIAM: 1.9 CM
BH CV ECHO MEAS - LVPWD: 0.74 CM
BH CV ECHO MEAS - LVPWS: 0.98 CM
BH CV ECHO MEAS - MR MAX PG: 26.8 MMHG
BH CV ECHO MEAS - MR MAX VEL: 259 CM/SEC
BH CV ECHO MEAS - MV A MAX VEL: 50.9 CM/SEC
BH CV ECHO MEAS - MV DEC SLOPE: 409 CM/SEC^2
BH CV ECHO MEAS - MV E MAX VEL: 87.3 CM/SEC
BH CV ECHO MEAS - MV E/A: 1.7
BH CV ECHO MEAS - PA MAX PG: 2.3 MMHG
BH CV ECHO MEAS - PA MEAN PG: 1 MMHG
BH CV ECHO MEAS - PA V2 MAX: 75.4 CM/SEC
BH CV ECHO MEAS - PA V2 MEAN: 42.4 CM/SEC
BH CV ECHO MEAS - PA V2 VTI: 12.5 CM
BH CV ECHO MEAS - RAP SYSTOLE: 10 MMHG
BH CV ECHO MEAS - RVSP: 23.4 MMHG
BH CV ECHO MEAS - SI(AO): 70.9 ML/M^2
BH CV ECHO MEAS - SI(CUBED): 19.2 ML/M^2
BH CV ECHO MEAS - SI(LVOT): 39 ML/M^2
BH CV ECHO MEAS - SI(TEICH): 20.7 ML/M^2
BH CV ECHO MEAS - SV(AO): 110.9 ML
BH CV ECHO MEAS - SV(CUBED): 30.1 ML
BH CV ECHO MEAS - SV(LVOT): 61 ML
BH CV ECHO MEAS - SV(TEICH): 32.3 ML
BH CV ECHO MEAS - TR MAX VEL: 179.5 CM/SEC
LV EF 2D ECHO EST: 60 %
MAXIMAL PREDICTED HEART RATE: 198 BPM
STRESS TARGET HR: 168 BPM
VIT B12 BLD-MCNC: 419 PG/ML (ref 239–931)

## 2019-03-12 PROCEDURE — 99214 OFFICE O/P EST MOD 30 MIN: CPT | Performed by: NURSE PRACTITIONER

## 2019-03-12 RX ORDER — SODIUM, POTASSIUM,MAG SULFATES 17.5-3.13G
1 SOLUTION, RECONSTITUTED, ORAL ORAL EVERY 12 HOURS
Qty: 2 BOTTLE | Refills: 0 | Status: ON HOLD | OUTPATIENT
Start: 2019-03-12 | End: 2019-04-10

## 2019-03-12 RX ORDER — DEXTROSE AND SODIUM CHLORIDE 5; .45 G/100ML; G/100ML
30 INJECTION, SOLUTION INTRAVENOUS CONTINUOUS PRN
Status: CANCELLED | OUTPATIENT
Start: 2019-04-10

## 2019-03-12 NOTE — PATIENT INSTRUCTIONS

## 2019-03-12 NOTE — PROGRESS NOTES
Chief Complaint   Patient presents with   • Abdominal Pain   • Constipation   • Nausea   • decreased appetite       Subjective    Toshia Barraza is a 24 y.o. female. she is being seen for consultation today at the request of Zoe Erazo CNM  24-year-old female presents to discuss ongoing abdominal pain constipation nausea decreased appetite and sensation of constant abdominal fullness.  States she has a hard time having a bowel movement typically goes about every other day but denies any melena or hematochezia.  Reports her mother had ovarian cancer.  She has followed with [facility] regarding pelvic pain and fullness.  Recently finished clindamycin for bacterial vaginosis    Abdominal Pain   This is a new problem. The current episode started more than 1 year ago (worsened recently ). The onset quality is gradual. The problem has been waxing and waning. The pain is located in the LUQ and LLQ. The quality of the pain is colicky, cramping and a sensation of fullness. Associated symptoms include constipation, headaches, myalgias and nausea. Pertinent negatives include no anorexia, arthralgias, diarrhea, fever, flatus, frequency, vomiting or weight loss.     Plan; schedule patient for CT abdomen pelvis due to generalized abdominal pain tenderness, schedule patient for EGD and colonoscopy due to generalized abdominal pain nausea early satiety decreased appetite.  Follow-up after test return office sooner if needed.       The following portions of the patient's history were reviewed and updated as appropriate:   Past Medical History:   Diagnosis Date   • Acute bacterial sinusitis    • Distorted body image    • Early onset of delivery before 37 weeks    • Encounter for  visit     Post mague care status   • Encounter for  visit      visit status   • Malaise and fatigue    • Primigravida      Past Surgical History:   Procedure Laterality Date   • BREAST BIOPSY       Family History   Problem  Relation Age of Onset   • Other Mother         hematologic disorder   • Heart disease Mother         ischemic heart disease   • Heart attack Mother    • Leukemia Mother    • Ovarian cancer Mother         questionable   • Heart disease Father    • Hypertension Father      OB History      Para Term  AB Living    2 2 1 1   2    SAB TAB Ectopic Molar Multiple Live Births                       Obstetric Comments    The baby had gastroschisis        Prior to Admission medications    Medication Sig Start Date End Date Taking? Authorizing Provider   butalbital-acetaminophen-caffeine (ORBIVAN) -40 MG capsule capsule take 1 to 2 capsules by mouth every 6 hours if needed for headache 19  Yes Provider, MD Jane   meclizine (ANTIVERT) 12.5 MG tablet Take 1 tablet by mouth 3 (Three) Times a Day As Needed for dizziness. 3/5/19  Yes Ciera Chinchilla APRN   ondansetron ODT (ZOFRAN ODT) 4 MG disintegrating tablet Take 1 tablet by mouth Every 8 (Eight) Hours As Needed for Nausea or Vomiting. 3/5/19   Ciera Chinchilla APRN   topiramate (TOPAMAX) 50 MG tablet Wk1:1tabinpm;wk2:1tabbid;wk3 1tabam,2tabpm;wk4:2tabbid 3/5/19   Ciera Chinchilla APRN   clindamycin (CLEOCIN) 300 MG capsule Take 1 capsule by mouth 2 (Two) Times a Day With Meals. Take for a week 2/25/19 3/12/19  Zoe Erazo CNM     No Known Allergies  Social History     Socioeconomic History   • Marital status: Single     Spouse name: Not on file   • Number of children: Not on file   • Years of education: Not on file   • Highest education level: Not on file   Tobacco Use   • Smoking status: Never Smoker   • Smokeless tobacco: Never Used   Substance and Sexual Activity   • Alcohol use: No   • Drug use: No   • Sexual activity: Yes     Birth control/protection: Condom       Review of Systems  Review of Systems   Constitutional: Positive for activity change, appetite change and fatigue. Negative for chills, diaphoresis, fever,  "unexpected weight change and weight loss.   HENT: Negative for sore throat and trouble swallowing.    Respiratory: Negative for shortness of breath.    Gastrointestinal: Positive for abdominal pain, constipation and nausea. Negative for abdominal distention, anal bleeding, anorexia, blood in stool, diarrhea, flatus, rectal pain and vomiting.   Genitourinary: Negative for frequency.   Musculoskeletal: Positive for myalgias. Negative for arthralgias.   Skin: Negative for pallor.   Neurological: Positive for headaches. Negative for light-headedness.        /64 (BP Location: Left arm, Patient Position: Sitting)   Pulse 79   Ht 162.6 cm (64\")   Wt 53.5 kg (118 lb)   LMP 02/25/2019 (Exact Date)   SpO2 100%   BMI 20.25 kg/m²     Objective    Physical Exam   Constitutional: She is oriented to person, place, and time. She appears well-developed and well-nourished. She is cooperative. No distress.   HENT:   Head: Normocephalic and atraumatic.   Neck: Normal range of motion. Neck supple. No thyromegaly present.   Cardiovascular: Normal rate, regular rhythm and normal heart sounds.   Pulmonary/Chest: Effort normal and breath sounds normal. She has no wheezes. She has no rhonchi. She has no rales.   Abdominal: Soft. Normal appearance and bowel sounds are normal. She exhibits no distension. There is no hepatosplenomegaly. There is generalized tenderness. There is no rigidity and no guarding. No hernia.   Lymphadenopathy:     She has no cervical adenopathy.   Neurological: She is alert and oriented to person, place, and time.   Skin: Skin is warm, dry and intact. No rash noted. No pallor.   Psychiatric: She has a normal mood and affect. Her speech is normal.     Lab on 03/11/2019   Component Date Value Ref Range Status   • Total Cholesterol 03/11/2019 133* 150 - 200 mg/dL Final   • Triglycerides 03/11/2019 64  <=150 mg/dL Final   • HDL Cholesterol 03/11/2019 76* 40 - 59 mg/dL Final   • LDL Cholesterol  03/11/2019 44  " <=100 mg/dL Final   • VLDL Cholesterol 03/11/2019 12.8  mg/dL Final   • LDL/HDL Ratio 03/11/2019 0.58  0.00 - 3.22 Final   • Glucose 03/11/2019 91  74 - 99 mg/dL Final   • BUN 03/11/2019 13  7 - 17 mg/dL Final   • Creatinine 03/11/2019 0.64  0.52 - 1.04 mg/dL Final   • Sodium 03/11/2019 138  137 - 145 mmol/L Final   • Potassium 03/11/2019 3.8  3.4 - 5.0 mmol/L Final   • Chloride 03/11/2019 105  98 - 107 mmol/L Final   • CO2 03/11/2019 20.0* 22.0 - 30.0 mmol/L Final   • Calcium 03/11/2019 8.5  8.4 - 10.2 mg/dL Final   • Total Protein 03/11/2019 7.4  6.3 - 8.2 g/dL Final   • Albumin 03/11/2019 4.70  3.50 - 5.00 g/dL Final   • ALT (SGPT) 03/11/2019 17  <=35 U/L Final   • AST (SGOT) 03/11/2019 16  14 - 36 U/L Final   • Alkaline Phosphatase 03/11/2019 58  38 - 126 U/L Final   • Total Bilirubin 03/11/2019 0.5  0.2 - 1.3 mg/dL Final   • eGFR Non African Amer 03/11/2019 116  71 - 165 mL/min/1.73 Final   • Globulin 03/11/2019 2.7  2.3 - 3.5 gm/dL Final   • A/G Ratio 03/11/2019 1.7  1.1 - 1.8 g/dL Final   • BUN/Creatinine Ratio 03/11/2019 20.3  7.0 - 25.0 Final   • Anion Gap 03/11/2019 13.0  5.0 - 15.0 mmol/L Final   • Vitamin B-12 03/11/2019 419  239 - 931 pg/mL Final   • WBC 03/11/2019 7.34  3.20 - 9.80 10*3/mm3 Final   • RBC 03/11/2019 4.38  3.77 - 5.16 10*6/mm3 Final   • Hemoglobin 03/11/2019 13.0  12.0 - 15.5 g/dL Final   • Hematocrit 03/11/2019 38.7  35.0 - 45.0 % Final   • MCV 03/11/2019 88.4  80.0 - 98.0 fL Final   • MCH 03/11/2019 29.7  26.5 - 34.0 pg Final   • MCHC 03/11/2019 33.6  31.4 - 36.0 g/dL Final   • RDW 03/11/2019 12.2  11.5 - 14.5 % Final   • RDW-SD 03/11/2019 38.7  36.4 - 46.3 fl Final   • MPV 03/11/2019 11.3  8.0 - 12.0 fL Final   • Platelets 03/11/2019 200  150 - 450 10*3/mm3 Final   • Neutrophil % 03/11/2019 66.9  37.0 - 80.0 % Final   • Lymphocyte % 03/11/2019 23.4  10.0 - 50.0 % Final   • Monocyte % 03/11/2019 7.1  0.0 - 12.0 % Final   • Eosinophil % 03/11/2019 2.5  0.0 - 7.0 % Final   • Basophil %  03/11/2019 0.1  0.0 - 2.0 % Final   • Neutrophils, Absolute 03/11/2019 4.91  2.00 - 8.60 10*3/mm3 Final   • Lymphocytes, Absolute 03/11/2019 1.72  0.60 - 4.20 10*3/mm3 Final   • Monocytes, Absolute 03/11/2019 0.52  0.00 - 0.90 10*3/mm3 Final   • Eosinophils, Absolute 03/11/2019 0.18  0.00 - 0.70 10*3/mm3 Final   • Basophils, Absolute 03/11/2019 0.01  0.00 - 0.20 10*3/mm3 Final     Assessment/Plan      1. Nausea    2. Early satiety    3. Change in bowel habits    4. Generalized abdominal pain    .       Orders placed during this encounter include:  Orders Placed This Encounter   Procedures   • CT Abdomen Pelvis With Contrast     Order Specific Question:   Patient Pregnant     Answer:   No     Order Specific Question:   Will Oral Contrast be needed for this procedure?     Answer:   Yes   • Pregnancy, Urine - Urine, Clean Catch     Standing Status:   Future     Standing Expiration Date:   3/12/2020   • Follow Anesthesia Guidelines / Standing Orders     Standing Status:   Future       ESOPHAGOGASTRODUODENOSCOPY Wed/Fri (N/A), COLONOSCOPY (N/A)    Review and/or summary of lab tests, radiology, procedures, medications. Review and summary of old records and obtaining of history. The risks and benefits of my recommendations, as well as other treatment options were discussed with the patient today. Questions were answered.    New Medications Ordered This Visit   Medications   • sodium-potassium-magnesium sulfates (SUPREP BOWEL PREP KIT) 17.5-3.13-1.6 GM/177ML solution oral solution     Sig: Take 1 bottle by mouth Every 12 (Twelve) Hours.     Dispense:  2 bottle     Refill:  0       Follow-up: Return in about 4 weeks (around 4/9/2019).          This document has been electronically signed by LATRICE Pearce on March 13, 2019 8:22 AM             Results for orders placed or performed in visit on 03/11/19   CBC Auto Differential   Result Value Ref Range    WBC 7.34 3.20 - 9.80 10*3/mm3    RBC 4.38 3.77 - 5.16 10*6/mm3     Hemoglobin 13.0 12.0 - 15.5 g/dL    Hematocrit 38.7 35.0 - 45.0 %    MCV 88.4 80.0 - 98.0 fL    MCH 29.7 26.5 - 34.0 pg    MCHC 33.6 31.4 - 36.0 g/dL    RDW 12.2 11.5 - 14.5 %    RDW-SD 38.7 36.4 - 46.3 fl    MPV 11.3 8.0 - 12.0 fL    Platelets 200 150 - 450 10*3/mm3    Neutrophil % 66.9 37.0 - 80.0 %    Lymphocyte % 23.4 10.0 - 50.0 %    Monocyte % 7.1 0.0 - 12.0 %    Eosinophil % 2.5 0.0 - 7.0 %    Basophil % 0.1 0.0 - 2.0 %    Neutrophils, Absolute 4.91 2.00 - 8.60 10*3/mm3    Lymphocytes, Absolute 1.72 0.60 - 4.20 10*3/mm3    Monocytes, Absolute 0.52 0.00 - 0.90 10*3/mm3    Eosinophils, Absolute 0.18 0.00 - 0.70 10*3/mm3    Basophils, Absolute 0.01 0.00 - 0.20 10*3/mm3   Vitamin B12   Result Value Ref Range    Vitamin B-12 419 239 - 931 pg/mL   Lipid Panel   Result Value Ref Range    Total Cholesterol 133 (L) 150 - 200 mg/dL    Triglycerides 64 <=150 mg/dL    HDL Cholesterol 76 (H) 40 - 59 mg/dL    LDL Cholesterol  44 <=100 mg/dL    VLDL Cholesterol 12.8 mg/dL    LDL/HDL Ratio 0.58 0.00 - 3.22   Comprehensive Metabolic Panel   Result Value Ref Range    Glucose 91 74 - 99 mg/dL    BUN 13 7 - 17 mg/dL    Creatinine 0.64 0.52 - 1.04 mg/dL    Sodium 138 137 - 145 mmol/L    Potassium 3.8 3.4 - 5.0 mmol/L    Chloride 105 98 - 107 mmol/L    CO2 20.0 (L) 22.0 - 30.0 mmol/L    Calcium 8.5 8.4 - 10.2 mg/dL    Total Protein 7.4 6.3 - 8.2 g/dL    Albumin 4.70 3.50 - 5.00 g/dL    ALT (SGPT) 17 <=35 U/L    AST (SGOT) 16 14 - 36 U/L    Alkaline Phosphatase 58 38 - 126 U/L    Total Bilirubin 0.5 0.2 - 1.3 mg/dL    eGFR Non  Amer 116 71 - 165 mL/min/1.73    Globulin 2.7 2.3 - 3.5 gm/dL    A/G Ratio 1.7 1.1 - 1.8 g/dL    BUN/Creatinine Ratio 20.3 7.0 - 25.0    Anion Gap 13.0 5.0 - 15.0 mmol/L   Results for orders placed or performed in visit on 03/07/19   Thyroid Antibodies   Result Value Ref Range    Thyroid Peroxidase Antibody 9 0 - 34 IU/mL    Thyroglobulin Ab <1.0 0.0 - 0.9 IU/mL   T3   Result Value Ref Range    T3,  Total 126.0 97.0 - 169.0 ng/dl   T3, Uptake   Result Value Ref Range    T3 Uptake 27 24 - 39 %   Results for orders placed or performed in visit on 03/05/19   Adult Transthoracic Echo Complete W/ Cont if Necessary Per Protocol   Result Value Ref Range    BSA 1.6 m^2    IVSd 0.83 cm    IVSs 0.74 cm    LVIDd 3.5 cm    LVIDs 2.3 cm    LVPWd 0.74 cm    BH CV ECHO ANGELIQUE - LVPWS 0.98 cm    IVS/LVPW 1.1     FS 34.6 %    EDV(Teich) 49.8 ml    ESV(Teich) 17.5 ml    EF(Teich) 64.8 %    EDV(cubed) 41.8 ml    ESV(cubed) 11.7 ml    EF(cubed) 72.0 %    % IVS thick -11.2 %    % LVPW thick 33.4 %    LV mass(C)d 72.2 grams    LV mass(C)dI 46.2 grams/m^2    LV mass(C)s 43.7 grams    LV mass(C)sI 28.0 grams/m^2    SV(Teich) 32.3 ml    SI(Teich) 20.7 ml/m^2    SV(cubed) 30.1 ml    SI(cubed) 19.2 ml/m^2    Ao root diam 2.7 cm    Ao root area 5.5 cm^2    ACS 1.8 cm    LA dimension 3.0 cm    LA/Ao 1.1     LVOT diam 1.9 cm    LVOT area 2.8 cm^2    LVOT area(traced) 2.8 cm^2    Ao root area (BSA corrected) 1.7     MV E max nahid 87.3 cm/sec    MV A max nahid 50.9 cm/sec    MV E/A 1.7     MV dec slope 409.0 cm/sec^2    Ao pk nahid 100.0 cm/sec    Ao max PG 4.0 mmHg    Ao max PG (full) 0 mmHg    Ao V2 mean 67.7 cm/sec    Ao mean PG 2.0 mmHg    Ao mean PG (full) 0 mmHg    Ao V2 VTI 20.1 cm    WALLACE(I,A) 3.0 cm^2    WALLACE(I,D) 3.0 cm^2    WALLACE(V,A) 2.8 cm^2    WALLACE(V,D) 2.8 cm^2    LV V1 max PG 4.0 mmHg    LV V1 mean PG 2.0 mmHg    LV V1 max 100.0 cm/sec    LV V1 mean 70.1 cm/sec    LV V1 VTI 21.5 cm    MR max nahid 259.0 cm/sec    MR max PG 26.8 mmHg    SV(Ao) 110.9 ml    SI(Ao) 70.9 ml/m^2    SV(LVOT) 61.0 ml    SI(LVOT) 39.0 ml/m^2    PA V2 max 75.4 cm/sec    PA max PG 2.3 mmHg    PA V2 mean 42.4 cm/sec    PA mean PG 1.0 mmHg    PA V2 VTI 12.5 cm    TR max nahid 179.5 cm/sec    RVSP(TR) 23.4 mmHg    RAP systole 10.0 mmHg     CV ECHO ANGELIQUE - BZI_BMI 20.3 kilograms/m^2     CV ECHO ANGELIQUE - BSA(GrantonCOCK) 1.6 m^2     CV ECHO ANGELIQUE - BZI_METRIC_WEIGHT 53.5 kg     BH CV ECHO ANGELIQUE - BZI_METRIC_HEIGHT 162.6 cm    Target HR (85%) 168 bpm    Max. Pred. HR (100%) 198 bpm    Echo EF Estimated 60 %     *Note: Due to a large number of results and/or encounters for the requested time period, some results have not been displayed. A complete set of results can be found in Results Review.

## 2019-03-14 DIAGNOSIS — R10.2 PELVIC PAIN IN FEMALE: Primary | ICD-10-CM

## 2019-03-15 ENCOUNTER — OFFICE VISIT (OUTPATIENT)
Dept: FAMILY MEDICINE CLINIC | Facility: CLINIC | Age: 24
End: 2019-03-15

## 2019-03-15 VITALS
OXYGEN SATURATION: 99 % | WEIGHT: 115.6 LBS | HEIGHT: 64 IN | TEMPERATURE: 98.5 F | BODY MASS INDEX: 19.74 KG/M2 | RESPIRATION RATE: 16 BRPM

## 2019-03-15 DIAGNOSIS — R68.81 EARLY SATIETY: ICD-10-CM

## 2019-03-15 DIAGNOSIS — R63.5 RECENT WEIGHT GAIN: ICD-10-CM

## 2019-03-15 DIAGNOSIS — R42 DIZZINESS: Primary | ICD-10-CM

## 2019-03-15 DIAGNOSIS — Z82.49 FAMILY HISTORY OF HEART DISEASE: ICD-10-CM

## 2019-03-15 DIAGNOSIS — R11.0 NAUSEA: ICD-10-CM

## 2019-03-15 DIAGNOSIS — R20.9 BILATERAL COLD FEET: ICD-10-CM

## 2019-03-15 DIAGNOSIS — R10.84 GENERALIZED ABDOMINAL PAIN: ICD-10-CM

## 2019-03-15 DIAGNOSIS — R59.0 PERIAURICULAR LYMPHADENOPATHY: ICD-10-CM

## 2019-03-15 DIAGNOSIS — R47.9 DIFFICULTY WITH SPEECH: ICD-10-CM

## 2019-03-15 DIAGNOSIS — R25.2 CRAMPING OF FEET: ICD-10-CM

## 2019-03-15 DIAGNOSIS — R59.0 SUBMANDIBULAR LYMPHADENOPATHY: ICD-10-CM

## 2019-03-15 DIAGNOSIS — R00.0 RACING HEART BEAT: ICD-10-CM

## 2019-03-15 DIAGNOSIS — G43.809 OTHER MIGRAINE WITHOUT STATUS MIGRAINOSUS, NOT INTRACTABLE: ICD-10-CM

## 2019-03-15 DIAGNOSIS — R53.83 OTHER FATIGUE: ICD-10-CM

## 2019-03-15 DIAGNOSIS — R19.8 ABDOMINAL FULLNESS: ICD-10-CM

## 2019-03-15 DIAGNOSIS — F41.9 ANXIETY: ICD-10-CM

## 2019-03-15 DIAGNOSIS — E53.8 VITAMIN B12 DEFICIENCY: ICD-10-CM

## 2019-03-15 PROCEDURE — 96372 THER/PROPH/DIAG INJ SC/IM: CPT | Performed by: NURSE PRACTITIONER

## 2019-03-15 PROCEDURE — 99214 OFFICE O/P EST MOD 30 MIN: CPT | Performed by: NURSE PRACTITIONER

## 2019-03-15 RX ORDER — CYANOCOBALAMIN 1000 UG/ML
1000 INJECTION, SOLUTION INTRAMUSCULAR; SUBCUTANEOUS
Qty: 1 ML | Refills: 5 | Status: SHIPPED | OUTPATIENT
Start: 2019-03-15 | End: 2020-09-11 | Stop reason: SDUPTHER

## 2019-03-15 RX ORDER — BUTALBITAL/ACETAMINOPHEN 50MG-325MG
1 TABLET ORAL EVERY 6 HOURS PRN
Qty: 60 EACH | Refills: 0 | Status: SHIPPED | OUTPATIENT
Start: 2019-03-15 | End: 2019-04-04 | Stop reason: SDUPTHER

## 2019-03-15 RX ORDER — CYANOCOBALAMIN 1000 UG/ML
1000 INJECTION, SOLUTION INTRAMUSCULAR; SUBCUTANEOUS
Status: DISCONTINUED | OUTPATIENT
Start: 2019-03-15 | End: 2021-08-23

## 2019-03-15 RX ADMIN — CYANOCOBALAMIN 1000 MCG: 1000 INJECTION, SOLUTION INTRAMUSCULAR; SUBCUTANEOUS at 16:40

## 2019-03-15 NOTE — PATIENT INSTRUCTIONS
Orthostatic Hypotension  Orthostatic hypotension is a sudden drop in blood pressure that happens when you quickly change positions, such as when you get up from a seated or lying position. Blood pressure is a measurement of how strongly, or weakly, your blood is pressing against the walls of your arteries. Arteries are blood vessels that carry blood from your heart throughout your body. When blood pressure is too low, you may not get enough blood to your brain or to the rest of your organs. This can cause weakness, light-headedness, rapid heartbeat, and fainting. This can last for just a few seconds or for up to a few minutes.  Orthostatic hypotension is usually not a serious problem. However, if it happens frequently or gets worse, it may be a sign of something more serious.  What are the causes?  This condition may be caused by:  · Sudden changes in posture, such as standing up quickly after you have been sitting or lying down.  · Blood loss.  · Loss of body fluids (dehydration).  · Heart problems.  · Hormone (endocrine) problems.  · Pregnancy.  · Severe infection.  · Lack of certain nutrients.  · Severe allergic reactions (anaphylaxis).  · Certain medicines, such as blood pressure medicine or medicines that make the body lose excess fluids (diuretics). Sometimes, this condition can be caused by not taking medicine as directed, such as taking too much of a certain medicine.    What increases the risk?  Certain factors can make you more likely to develop orthostatic hypotension, including:  · Age. Risk increases as you get older.  · Conditions that affect the heart or the central nervous system.  · Taking certain medicines, such as blood pressure medicine or diuretics.  · Being pregnant.    What are the signs or symptoms?  Symptoms of this condition may include:  · Weakness.  · Light-headedness.  · Dizziness.  · Blurred vision.  · Fatigue.  · Rapid heartbeat.  · Fainting, in severe cases.    How is this  "diagnosed?  This condition is diagnosed based on:  · Your medical history.  · Your symptoms.  · Your blood pressure measurement. Your health care provider will check your blood pressure when you are:  ? Lying down.  ? Sitting.  ? Standing.    A blood pressure reading is recorded as two numbers, such as \"120 over 80\" (or 120/80). The first (\"top\") number is called the systolic pressure. It is a measure of the pressure in your arteries as your heart beats. The second (\"bottom\") number is called the diastolic pressure. It is a measure of the pressure in your arteries when your heart relaxes between beats. Blood pressure is measured in a unit called mm Hg. Healthy blood pressure for adults is 120/80. If your blood pressure is below 90/60, you may be diagnosed with hypotension.  Other information or tests that may be used to diagnose orthostatic hypotension include:  · Your other vital signs, such as your heart rate and temperature.  · Blood tests.  · Tilt table test. For this test, you will be safely secured to a table that moves you from a lying position to an upright position. Your heart rhythm and blood pressure will be monitored during the test.    How is this treated?  Treatment for this condition may include:  · Changing your diet. This may involve eating more salt (sodium) or drinking more water.  · Taking medicines to raise your blood pressure.  · Changing the dosage of certain medicines you are taking that might be lowering your blood pressure.  · Wearing compression stockings. These stockings help to prevent blood clots and reduce swelling in your legs.    In some cases, you may need to go to the hospital for:  · Fluid replacement. This means you will receive fluids through an IV tube.  · Blood replacement. This means you will receive donated blood through an IV tube (transfusion).  · Treating an infection or heart problems, if this applies.  · Monitoring. You may need to be monitored while medicines that you " are taking wear off.    Follow these instructions at home:  Eating and drinking    · Drink enough fluid to keep your urine clear or pale yellow.  · Eat a healthy diet and follow instructions from your health care provider about eating or drinking restrictions. A healthy diet includes:  ? Fresh fruits and vegetables.  ? Whole grains.  ? Lean meats.  ? Low-fat dairy products.  · Eat extra salt only as directed. Do not add extra salt to your diet unless your health care provider told you to do that.  · Eat frequent, small meals.  · Avoid standing up suddenly after eating.  Medicines  · Take over-the-counter and prescription medicines only as told by your health care provider.  ? Follow instructions from your health care provider about changing the dosage of your current medicines, if this applies.  ? Do not stop or adjust any of your medicines on your own.  General instructions  · Wear compression stockings as told by your health care provider.  · Get up slowly from lying down or sitting positions. This gives your blood pressure a chance to adjust.  · Avoid hot showers and excessive heat as directed by your health care provider.  · Return to your normal activities as told by your health care provider. Ask your health care provider what activities are safe for you.  · Do not use any products that contain nicotine or tobacco, such as cigarettes and e-cigarettes. If you need help quitting, ask your health care provider.  · Keep all follow-up visits as told by your health care provider. This is important.  Contact a health care provider if:  · You vomit.  · You have diarrhea.  · You have a fever for more than 2-3 days.  · You feel more thirsty than usual.  · You feel weak and tired.  Get help right away if:  · You have chest pain.  · You have a fast or irregular heartbeat.  · You develop numbness in any part of your body.  · You cannot move your arms or your legs.  · You have trouble speaking.  · You become sweaty or feel  lightheaded.  · You faint.  · You feel short of breath.  · You have trouble staying awake.  · You feel confused.  This information is not intended to replace advice given to you by your health care provider. Make sure you discuss any questions you have with your health care provider.  Document Released: 12/08/2003 Document Revised: 09/05/2017 Document Reviewed: 06/09/2017  [employer] Interactive Patient Education © 2019 [employer]  Postural Orthostatic Tachycardia Syndrome  Postural orthostatic tachycardia syndrome (POTS) is a group of symptoms that can occur when you stand up after lying down. POTS happens when less blood flows to the heart than normal after you stand up. The reduced blood flow to the heart makes the heart beat rapidly.  POTS may be associated with another medical condition, or it may occur on its own.  What are the causes?  This cause of this condition is not known, but many conditions and diseases have been linked to it.  What increases the risk?  This condition is more likely to develop in:  · Women 15-50 years old.  · Women who are pregnant.  · Women who are menstruating.  · People who have certain conditions, including:  ? A viral infection.  ? An autoimmune disease.  ? Anemia.  ? Dehydration.  ? Hyperthyroidism.  · People who take certain medicines.  · People who have had a major injury.  · People who have had surgery.    What are the signs or symptoms?  The most common symptom of this condition is lightheadedness after standing up from a lying down position. Other symptoms may include:  · Feeling a rapid increase in the speed of the heartbeat (tachycardia) within 10 minutes of standing up.  · Fainting.  · Weakness.  · Confusion.  · Trembling.  · Shortness of breath.  · Sweating or flushing.  · Headache.  · Chest pain.  · Breathing that is deeper and faster than normal (hyperventilation).  · Nausea.  · Anxiety.    Symptoms may be worse in the morning, and they may be relieved by lying  down.  How is this diagnosed?  This condition is diagnosed based on:  · Your symptoms.  · Your medical history.  · A physical exam.  · Measurements of your heart rate when you are lying down and after you stand up.  · A measurement of your blood pressure. The measurement will be taken when you go from lying down to standing up.  · Blood tests to measure hormones that change with blood pressure. The blood tests will be done when you are lying down and standing up.    You may have other tests to check whether you have a condition or disease that is linked to POTS.  How is this treated?  Treatment for this condition depends on how severe your symptoms are and whether you have any conditions or diseases that have been linked to POTS. Treatment may involve:  · Treating any conditions or diseases that have been linked to POTS.  · Drinking two glasses of water before getting up from a lying position.  · Eating more salt (sodium).  · Taking medicine to control blood pressure and heart rate (beta-blocker).  · Taking medicines to control blood flow, blood pressure, or heart rate.  · Avoiding certain medicines.  · Starting an exercise program under the supervision of your health care provider.    Follow these instructions at home:    Eating and drinking  · Drink enough fluid to keep your urine clear or pale yellow.  · If told by your health care provider, drink two glasses of water before getting up from a lying position.  · Follow instructions from your health care provider about how much sodium you should eat.  · Eat several small meals a day instead of a few large meals.  · Avoid heavy meals.  Medicines  · Take over-the-counter and prescription medicines only as told by your health care provider.  · Talk with your health care provider before starting any new medicines.  Activity  · Do an aerobic exercise for 20 minutes a day, at least 3 days a week.  · Ask your health care provider what kinds of exercise are safe for  you.  Contact a health care provider if:  · Your symptoms do not improve after treatment.  · Your symptoms get worse.  · You develop new symptoms.  Get help right away if:  · You have chest pain.  · You have difficulty breathing.  · You have fainting episodes.  This information is not intended to replace advice given to you by your health care provider. Make sure you discuss any questions you have with your health care provider.  Document Released: 12/08/2003 Document Revised: 01/25/2017 Document Reviewed: 07/01/2016  [employer] Interactive Patient Education © 2019 [employer]

## 2019-03-17 PROBLEM — R00.0 RACING HEART BEAT: Status: ACTIVE | Noted: 2019-03-17

## 2019-03-17 PROBLEM — R42 DIZZINESS: Status: ACTIVE | Noted: 2019-03-17

## 2019-03-17 PROBLEM — G43.909 MIGRAINE: Status: ACTIVE | Noted: 2019-03-17

## 2019-03-17 PROBLEM — R59.0: Status: ACTIVE | Noted: 2019-03-17

## 2019-03-17 PROBLEM — R59.0 SUBMANDIBULAR LYMPHADENOPATHY: Status: ACTIVE | Noted: 2019-03-17

## 2019-03-17 PROBLEM — R20.9 BILATERAL COLD FEET: Status: ACTIVE | Noted: 2019-03-17

## 2019-03-17 PROBLEM — R25.2 CRAMPING OF FEET: Status: ACTIVE | Noted: 2019-03-17

## 2019-03-17 PROBLEM — R19.8 ABDOMINAL FULLNESS: Status: ACTIVE | Noted: 2019-03-17

## 2019-03-17 PROBLEM — F41.9 ANXIETY: Status: ACTIVE | Noted: 2019-03-17

## 2019-03-17 PROBLEM — R63.5 RECENT WEIGHT GAIN: Status: ACTIVE | Noted: 2019-03-17

## 2019-03-17 PROBLEM — Z82.49 FAMILY HISTORY OF HEART DISEASE: Status: ACTIVE | Noted: 2019-03-17

## 2019-03-17 PROBLEM — R53.83 OTHER FATIGUE: Status: ACTIVE | Noted: 2019-03-17

## 2019-03-17 PROBLEM — E53.8 VITAMIN B12 DEFICIENCY: Status: ACTIVE | Noted: 2019-03-17

## 2019-03-17 PROBLEM — R47.9 DIFFICULTY WITH SPEECH: Status: ACTIVE | Noted: 2019-03-17
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
## 2019-03-18 ENCOUNTER — OFFICE VISIT (OUTPATIENT)
Dept: ORTHOPEDIC SURGERY | Facility: CLINIC | Age: 24
End: 2019-03-18

## 2019-03-18 DIAGNOSIS — M54.2 NECK PAIN: Primary | ICD-10-CM

## 2019-03-18 DIAGNOSIS — R59.1 LYMPHADENOPATHY: Primary | ICD-10-CM

## 2019-03-18 DIAGNOSIS — R10.2 PELVIC PAIN IN FEMALE: ICD-10-CM

## 2019-03-18 DIAGNOSIS — M54.6 THORACIC BACK PAIN, UNSPECIFIED BACK PAIN LATERALITY, UNSPECIFIED CHRONICITY: ICD-10-CM

## 2019-03-18 PROCEDURE — 99213 OFFICE O/P EST LOW 20 MIN: CPT | Performed by: ORTHOPAEDIC SURGERY

## 2019-03-18 RX ORDER — CYCLOBENZAPRINE HCL 10 MG
10 TABLET ORAL 3 TIMES DAILY PRN
Qty: 60 TABLET | Refills: 1 | Status: SHIPPED | OUTPATIENT
Start: 2019-03-18 | End: 2019-09-04

## 2019-03-18 RX ORDER — DOXYCYCLINE 100 MG/1
100 TABLET ORAL 2 TIMES DAILY
Qty: 28 TABLET | Refills: 0 | Status: SHIPPED | OUTPATIENT
Start: 2019-03-18 | End: 2019-04-01

## 2019-03-18 NOTE — PROGRESS NOTES
Toshia Barraza is a 24 y.o. female returns for     Chief Complaint   Patient presents with   • Cervical Spine - Follow-up, Pain   • Thoracic Spine - Follow-up, Pain   • Lumbar Spine - Follow-up, Pain     MRI @ [facility]  HISTORY OF PRESENT ILLNESS: mri results lumbar spine  Pain scale today 5/10    24 y some improvement with TENS unit, states symptoms continue.    Overall progress is slow but she has made some improvements.   complains of neck pain, complains of low back pain, the pain is intermittent, no radiating pain, she has headaches which stem from the neck pain  No fevers, no chills  No nighttime awakening pain.       CONCURRENT MEDICAL HISTORY:    The following portions of the patient's history were reviewed and updated as appropriate: allergies, current medications, past family history, past medical history, past social history, past surgical history and problem list.     ROS  No fevers or chills.  No chest pain or shortness of air.  No GI or  disturbances.    PHYSICAL EXAMINATION:       [facility] 02/25/2019 (Exact Date)     Physical Exam    GAIT:     []  Normal  []  Antalgic    Assistive device: [x]  None  []  Walker     []  Crutches  []  Cane     []  Wheelchair  []  Stretcher    Ortho Exam    Tenderness: Lumbar   Swelling:  None       Range of Motion:      Flexion: 80     Extension: 10     Lateral Bend:   Right 10                              Left 10     Rotation:          Right 10                              Left 10       SLR:                  Right Negative                             Left Negative       Muscle Strength      Abductor: 5/5     Adductor: 5/5     Quadriceps: 5/5     Hamstrings: 5/5       Reflexes     Patellar: 1/4    Achilles: 1/4    Babinski: Not done    Biceps: Not done    Triceps: Not done       Sensation: Normal   Gait: Normal   Toe Walk: exam deferred   Heel Walk: exam deferred       Mri Lumbar Spine Without Contrast    Result Date: 2/25/2019  Narrative: Procedure: MRI lumbar spine  without contrast Reason for exam: Low back pain FINDINGS: Multisequence multiplanar MR imaging of the lumbar spine was performed without contrast. The lumbar spine vertebral body heights and alignment are normal. There is no evidence of acute fracture or dislocation. No abnormal marrow signal. The conus of the spinal cord appears normal with tip at the L1 vertebral body level. T12-L1: Disc space normal. No disc protrusion or extrusion. Spinal cord and nerve roots exit without compromise. L1-L2: Disc space normal. No disc protrusion or extrusion. Nerve roots exit without compromise. L2-L3: Disc space normal. No disc protrusion or extrusion. Nerve roots exit without compromise. L3-L4: Disc space normal. No disc protrusion or extrusion. Nerve roots exit without compromise. L4-L5: Disc space normal. No disc protrusion or extrusion. Nerve roots exit without compromise. L5-S1: Disc space normal. No disc protrusion or extrusion. Nerve roots exit without compromise.     Impression: 1.  No acute MRI lumbar spine abnormality. Electronically signed by:  Rafy Shelton MD  2/25/2019 12:04 PM CST Workstation: [id number]    Us Thyroid    Result Date: 3/6/2019  Narrative: THYROID ULTRASOUND HISTORY: Fatigue. Weight gain. Swelling. Possible area right submandibular region x1 month. The thyroid was scanned with a small parts transducer. COMPARISON: None The right lobe measures 4.15 cm in length by 1.24 cm AP by 1 cm transverse. Volume right lobe 2.7 mL. The left lobe measures 4.9 cm in length by 1.23 cm AP by 1.18 cm transverse. Volume left lobe 3.2 mL. Unremarkable isthmus. Thyroid of uniform echotexture. 2.4 mm colloid cyst upper pole right lobe. 2.3 mm colloid cyst upper pole left lobe. No solid nodule. Less than 1 cm right submandibular lymph node.     Impression: CONCLUSION: Thyroid is normal in size with a few small colloid cyst. No suspicious solid nodule. Less than 1 cm right submandibular lymph node. [id number] Electronically signed  by:  Raúl Farah MD  3/6/2019 4:30 PM CST Workstation: [employer]    I reviewed MRI of lumbar spine- slight/ subtle disc protrusion of L5-S1, no disc herniation        ASSESSMENT:    Diagnoses and all orders for this visit:    Neck pain    Thoracic back pain, unspecified back pain laterality, unspecified chronicity    Other orders  -     cyclobenzaprine (FLEXERIL) 10 MG tablet; Take 1 tablet by mouth 3 (Three) Times a Day As Needed for Muscle Spasms.  -     Misc. Devices (CERVICAL TRACTION) kit; 1 Device Daily.          PLAN    Traction device ordered for her.   I ordered flexeril, I reviewed possible side effects of medicines with her.          Anastacio Bell MD

## 2019-03-21 ENCOUNTER — DOCUMENTATION (OUTPATIENT)
Dept: FAMILY MEDICINE CLINIC | Facility: CLINIC | Age: 24
End: 2019-03-21

## 2019-03-21 ENCOUNTER — LAB (OUTPATIENT)
Dept: LAB | Facility: HOSPITAL | Age: 24
End: 2019-03-21

## 2019-03-21 ENCOUNTER — HOSPITAL ENCOUNTER (OUTPATIENT)
Dept: CT IMAGING | Facility: HOSPITAL | Age: 24
Discharge: HOME | End: 2019-03-21
Admitting: NURSE PRACTITIONER

## 2019-03-21 DIAGNOSIS — R68.81 EARLY SATIETY: ICD-10-CM

## 2019-03-21 DIAGNOSIS — R11.0 NAUSEA: ICD-10-CM

## 2019-03-21 LAB — B-HCG UR QL: NEGATIVE

## 2019-03-21 PROCEDURE — 25010000002 IOPAMIDOL 61 % SOLUTION: Performed by: NURSE PRACTITIONER

## 2019-03-21 PROCEDURE — 74177 CT ABD & PELVIS W/CONTRAST: CPT

## 2019-03-21 PROCEDURE — 81025 URINE PREGNANCY TEST: CPT

## 2019-03-21 RX ADMIN — IOPAMIDOL 75 ML: 612 INJECTION, SOLUTION INTRAVENOUS at 16:39

## 2019-03-21 NOTE — PROGRESS NOTES
Please let patient know that we are alter monitor results back, it shows that she has normal sinus rhythm which is normal for every patient with a heart rate of 79 on average, normal heart rate should run between  so this is normal.  And states that she has a wandering atrial pacemaker, this is usually a normal finding however we want cardiology to look at it to make sure that it is normal based on her symptoms, so we will fax this to Isabel Kahn to be reviewed before her appointment on 3/28.  Please make appointment to follow up with me after your point with Isabel walk-ins a couple days after so we can see how you are doing on everything.  And that way you will have had the antibiotic almost if not finished by then as well.

## 2019-03-21 NOTE — PROGRESS NOTES
Reviewed Holter monitor results that started on 3/11/19.  Showed sinus with average heart rate of 79, isolated PACs, slight wandering atrial pacemaker, longest R to R noted due to sinus slowing/wandering atrial pacemaker.

## 2019-03-25 ENCOUNTER — TELEPHONE (OUTPATIENT)
Dept: GASTROENTEROLOGY | Facility: CLINIC | Age: 24
End: 2019-03-25

## 2019-03-25 NOTE — TELEPHONE ENCOUNTER
Attempted to return patients call regarding her ct results. There was no answer on her phone and there was no voicemail available to leave a message.

## 2019-03-28 ENCOUNTER — TELEPHONE (OUTPATIENT)
Dept: GASTROENTEROLOGY | Facility: CLINIC | Age: 24
End: 2019-03-28

## 2019-03-28 NOTE — TELEPHONE ENCOUNTER
Contacted patient regarding results of ct test. Relayed results and recommendations to patient who voiced understanding. Also reminded patient of endoscopy on April 10.

## 2019-04-04 ENCOUNTER — OFFICE VISIT (OUTPATIENT)
Dept: FAMILY MEDICINE CLINIC | Facility: CLINIC | Age: 24
End: 2019-04-04

## 2019-04-04 VITALS
BODY MASS INDEX: 19.43 KG/M2 | HEIGHT: 64 IN | RESPIRATION RATE: 16 BRPM | HEART RATE: 94 BPM | SYSTOLIC BLOOD PRESSURE: 104 MMHG | OXYGEN SATURATION: 99 % | WEIGHT: 113.8 LBS | TEMPERATURE: 99.3 F | DIASTOLIC BLOOD PRESSURE: 62 MMHG

## 2019-04-04 DIAGNOSIS — R09.82 POST-NASAL DRAINAGE: ICD-10-CM

## 2019-04-04 DIAGNOSIS — J02.9 PHARYNGITIS, UNSPECIFIED ETIOLOGY: Primary | ICD-10-CM

## 2019-04-04 PROCEDURE — 96372 THER/PROPH/DIAG INJ SC/IM: CPT | Performed by: NURSE PRACTITIONER

## 2019-04-04 PROCEDURE — 99214 OFFICE O/P EST MOD 30 MIN: CPT | Performed by: NURSE PRACTITIONER

## 2019-04-04 RX ORDER — CEFTRIAXONE 1 G/1
1 INJECTION, POWDER, FOR SOLUTION INTRAMUSCULAR; INTRAVENOUS ONCE
Status: COMPLETED | OUTPATIENT
Start: 2019-04-04 | End: 2019-04-04

## 2019-04-04 RX ORDER — DOCUSATE SODIUM 100 MG/1
100 CAPSULE, LIQUID FILLED ORAL 2 TIMES DAILY
Qty: 60 CAPSULE | Refills: 5 | Status: SHIPPED | OUTPATIENT
Start: 2019-04-04 | End: 2019-09-04

## 2019-04-04 RX ORDER — POLYETHYLENE GLYCOL 3350 17 G/17G
17 POWDER, FOR SOLUTION ORAL DAILY PRN
Qty: 30 EACH | Refills: 5 | Status: SHIPPED | OUTPATIENT
Start: 2019-04-04 | End: 2019-09-04

## 2019-04-04 RX ORDER — FLUTICASONE PROPIONATE 50 MCG
2 SPRAY, SUSPENSION (ML) NASAL DAILY
Qty: 9.9 ML | Refills: 0 | Status: SHIPPED | OUTPATIENT
Start: 2019-04-04 | End: 2019-09-04

## 2019-04-04 RX ORDER — METHYLPREDNISOLONE 4 MG/1
TABLET ORAL
Qty: 1 EACH | Refills: 0 | Status: SHIPPED | OUTPATIENT
Start: 2019-04-04 | End: 2019-04-05

## 2019-04-04 RX ORDER — CEFTRIAXONE 1 G/1
1 INJECTION, POWDER, FOR SOLUTION INTRAMUSCULAR; INTRAVENOUS ONCE
Qty: 1 G | Refills: 0 | Status: SHIPPED | OUTPATIENT
Start: 2019-04-04 | End: 2019-04-04

## 2019-04-04 RX ORDER — BUTALBITAL/ACETAMINOPHEN 50MG-325MG
1 TABLET ORAL EVERY 6 HOURS PRN
Qty: 60 EACH | Refills: 0 | Status: SHIPPED | OUTPATIENT
Start: 2019-04-04 | End: 2019-07-26 | Stop reason: SDUPTHER

## 2019-04-04 RX ADMIN — CEFTRIAXONE 1 G: 1 INJECTION, POWDER, FOR SOLUTION INTRAMUSCULAR; INTRAVENOUS at 13:04

## 2019-04-05 ENCOUNTER — DOCUMENTATION (OUTPATIENT)
Dept: FAMILY MEDICINE CLINIC | Facility: CLINIC | Age: 24
End: 2019-04-05

## 2019-04-05 NOTE — PROGRESS NOTES
Impression of transvaginal ultrasound done by prenatal group on 3/7/19 ordered by's from there are revealed uterus of normal size, normal-appearing endothelium, normal-appearing ovaries bilaterally, no fluid seen in the cul-de-sac.  Detailed results showed 2 simple follicular cysts, largest measuring 2.1 x 1.9 x 2.4 cm.    Impression of CT scan of abdomen and pelvis with contrast done on 3/21/19, ordered by Jarad [facility]: Lulú impression of in the right adnexa posterior to the right side of uterus is well circumcised cystic structure measuring 5.1 x 3.4 x 4.7 cm most likely a right ovarian cyst, based on size of cyst a follow-up pelvic ultrasound in 6-12 weeks is requested.  There is also a smaller 1.7 x 2.0 x 1.4 left adnexal cyst, suggestive of a benign left ovarian cyst.  There is also significant constipation.

## 2019-04-05 NOTE — PROGRESS NOTES
Jeanette when pt calls please let her know.    I spoke with Abril our OB/GYN here and she states that the follicular cysts are simple cysts and less worrisome but they can get bigger quickly, what makes them worrisome is when they get too big and can flip over causing ovarian torsion which would cause intense pain that won't let up. This MAY happen between 6 to 8cm (usually 8 or more) but don't worry we will recheck in six weeks from 3/21/19, if you want me to order, I still don't think it would hurt to go back to see Dr. Adan to have a specialist watch as well. If this is what is causing your pain, birth control pills and ibuprofen may help with pain (per Abril), I don't see that she is on a birth control medication.    My suggestions would be:  1. Let me schedule pelvic u/s here for 5/3/19 or after.  2. See dr adan for pain control or me if wanted to start on birth control for pain. Ibuprofen otc 800mg tid prn is okay.  3. If any severe pain that doesn't let up, go to ER.       Just let me know what she thinks.

## 2019-04-10 ENCOUNTER — HOSPITAL ENCOUNTER (OUTPATIENT)
Facility: HOSPITAL | Age: 24
Setting detail: HOSPITAL OUTPATIENT SURGERY
Discharge: HOME | End: 2019-04-10
Attending: INTERNAL MEDICINE | Admitting: INTERNAL MEDICINE

## 2019-04-10 ENCOUNTER — ANESTHESIA (OUTPATIENT)
Dept: GASTROENTEROLOGY | Facility: HOSPITAL | Age: 24
End: 2019-04-10

## 2019-04-10 ENCOUNTER — ANESTHESIA EVENT (OUTPATIENT)
Dept: GASTROENTEROLOGY | Facility: HOSPITAL | Age: 24
End: 2019-04-10

## 2019-04-10 VITALS
HEIGHT: 64 IN | WEIGHT: 112.2 LBS | DIASTOLIC BLOOD PRESSURE: 50 MMHG | RESPIRATION RATE: 18 BRPM | BODY MASS INDEX: 19.15 KG/M2 | TEMPERATURE: 97.5 F | OXYGEN SATURATION: 100 % | SYSTOLIC BLOOD PRESSURE: 101 MMHG | HEART RATE: 78 BPM

## 2019-04-10 DIAGNOSIS — R10.84 GENERALIZED ABDOMINAL PAIN: ICD-10-CM

## 2019-04-10 DIAGNOSIS — R19.4 CHANGE IN BOWEL HABITS: ICD-10-CM

## 2019-04-10 DIAGNOSIS — R11.0 NAUSEA: ICD-10-CM

## 2019-04-10 DIAGNOSIS — R68.81 EARLY SATIETY: ICD-10-CM

## 2019-04-10 LAB — B-HCG UR QL: NEGATIVE

## 2019-04-10 PROCEDURE — 81025 URINE PREGNANCY TEST: CPT | Performed by: INTERNAL MEDICINE

## 2019-04-10 PROCEDURE — 88305 TISSUE EXAM BY PATHOLOGIST: CPT | Performed by: PATHOLOGY

## 2019-04-10 PROCEDURE — 25010000002 PROPOFOL 10 MG/ML EMULSION: Performed by: NURSE ANESTHETIST, CERTIFIED REGISTERED

## 2019-04-10 PROCEDURE — 43239 EGD BIOPSY SINGLE/MULTIPLE: CPT | Performed by: INTERNAL MEDICINE

## 2019-04-10 PROCEDURE — 88342 IMHCHEM/IMCYTCHM 1ST ANTB: CPT | Performed by: PATHOLOGY

## 2019-04-10 PROCEDURE — 88305 TISSUE EXAM BY PATHOLOGIST: CPT | Performed by: INTERNAL MEDICINE

## 2019-04-10 PROCEDURE — 88342 IMHCHEM/IMCYTCHM 1ST ANTB: CPT | Performed by: INTERNAL MEDICINE

## 2019-04-10 PROCEDURE — 45380 COLONOSCOPY AND BIOPSY: CPT | Performed by: INTERNAL MEDICINE

## 2019-04-10 RX ORDER — DEXTROSE AND SODIUM CHLORIDE 5; .45 G/100ML; G/100ML
30 INJECTION, SOLUTION INTRAVENOUS CONTINUOUS PRN
Status: DISCONTINUED | OUTPATIENT
Start: 2019-04-10 | End: 2019-04-10 | Stop reason: HOSPADM

## 2019-04-10 RX ORDER — LIDOCAINE HYDROCHLORIDE 10 MG/ML
INJECTION, SOLUTION INFILTRATION; PERINEURAL AS NEEDED
Status: DISCONTINUED | OUTPATIENT
Start: 2019-04-10 | End: 2019-04-10 | Stop reason: SURG

## 2019-04-10 RX ORDER — PROPOFOL 10 MG/ML
VIAL (ML) INTRAVENOUS AS NEEDED
Status: DISCONTINUED | OUTPATIENT
Start: 2019-04-10 | End: 2019-04-10 | Stop reason: SURG

## 2019-04-10 RX ADMIN — PROPOFOL 20 MG: 10 INJECTION, EMULSION INTRAVENOUS at 12:42

## 2019-04-10 RX ADMIN — PROPOFOL 40 MG: 10 INJECTION, EMULSION INTRAVENOUS at 12:35

## 2019-04-10 RX ADMIN — DEXTROSE AND SODIUM CHLORIDE 30 ML/HR: 5; 450 INJECTION, SOLUTION INTRAVENOUS at 11:51

## 2019-04-10 RX ADMIN — LIDOCAINE HYDROCHLORIDE 60 MG: 10 INJECTION, SOLUTION INFILTRATION; PERINEURAL at 12:31

## 2019-04-10 RX ADMIN — PROPOFOL 120 MG: 10 INJECTION, EMULSION INTRAVENOUS at 12:31

## 2019-04-10 RX ADMIN — DEXTROSE AND SODIUM CHLORIDE: 5; 450 INJECTION, SOLUTION INTRAVENOUS at 12:23

## 2019-04-10 RX ADMIN — PROPOFOL 20 MG: 10 INJECTION, EMULSION INTRAVENOUS at 12:38

## 2019-04-10 NOTE — ANESTHESIA PREPROCEDURE EVALUATION
Anesthesia Evaluation     Patient summary reviewed and Nursing notes reviewed   NPO Solid Status: > 8 hours  NPO Liquid Status: > 2 hours           Airway   Mallampati: II  TM distance: >3 FB  Neck ROM: full  No difficulty expected  Dental - normal exam     Pulmonary - normal exam   (+) recent URI resolved,   Cardiovascular - negative cardio ROS and normal exam        Neuro/Psych  (+) headaches, dizziness/light headedness, numbness, psychiatric history Anxiety,     GI/Hepatic/Renal/Endo - negative ROS     Musculoskeletal     (+) back pain, neck pain,   Abdominal  - normal exam   Substance History   (+) alcohol use,      OB/GYN negative ob/gyn ROS   (-)  Pregnant        Other                        Anesthesia Plan    ASA 2     MAC     intravenous induction   Anesthetic plan, all risks, benefits, and alternatives have been provided, discussed and informed consent has been obtained with: patient.

## 2019-04-10 NOTE — ANESTHESIA POSTPROCEDURE EVALUATION
Patient: Toshia Barraza    Procedure Summary     Date:  04/10/19 Room / Location:  [facility] ENDOSCOPY 3 / [facility] ENDOSCOPY    Anesthesia Start:  1229 Anesthesia Stop:  1246    Procedures:       ESOPHAGOGASTRODUODENOSCOPY Wed/Fri (N/A )      COLONOSCOPY (N/A ) Diagnosis:       Nausea      Early satiety      Change in bowel habits      Generalized abdominal pain      (Nausea [R11.0])      (Early satiety [R68.81])      (Change in bowel habits [R19.4])      (Generalized abdominal pain [R10.84])    Surgeon:  Aidan Alvarado MD Provider:  Juliana Alarcon CRNA    Anesthesia Type:  MAC ASA Status:  2          Anesthesia Type: MAC  Last vitals  BP   112/68 (04/10/19 1138)   Temp   98.4 °F (36.9 °C) (04/10/19 1138)   Pulse   75 (04/10/19 1138)   Resp   20 (04/10/19 1138)     SpO2   95 % (04/10/19 1138)     Post Anesthesia Care and Evaluation    Patient location during evaluation: bedside  Patient participation: waiting for patient participation  Level of consciousness: responsive to verbal stimuli  Pain score: 0  Pain management: adequate  Airway patency: patent  Anesthetic complications: No anesthetic complications  PONV Status: none  Cardiovascular status: acceptable  Respiratory status: acceptable  Hydration status: acceptable

## 2019-04-11 LAB
LAB AP CASE REPORT: NORMAL
LAB AP DIAGNOSIS COMMENT: NORMAL
PATH REPORT.FINAL DX SPEC: NORMAL
PATH REPORT.GROSS SPEC: NORMAL

## 2019-04-12 DIAGNOSIS — N83.202 CYSTS OF BOTH OVARIES: ICD-10-CM

## 2019-04-12 DIAGNOSIS — R10.2 PELVIC PAIN: Primary | ICD-10-CM

## 2019-04-12 DIAGNOSIS — N83.201 CYSTS OF BOTH OVARIES: ICD-10-CM

## 2019-04-17 ENCOUNTER — OFFICE VISIT (OUTPATIENT)
Dept: GASTROENTEROLOGY | Facility: CLINIC | Age: 24
End: 2019-04-17

## 2019-04-17 VITALS
SYSTOLIC BLOOD PRESSURE: 128 MMHG | DIASTOLIC BLOOD PRESSURE: 72 MMHG | BODY MASS INDEX: 20.01 KG/M2 | HEART RATE: 78 BPM | WEIGHT: 117.2 LBS | HEIGHT: 64 IN

## 2019-04-17 DIAGNOSIS — K21.00 GASTROESOPHAGEAL REFLUX DISEASE WITH ESOPHAGITIS: Primary | ICD-10-CM

## 2019-04-17 DIAGNOSIS — R68.81 EARLY SATIETY: ICD-10-CM

## 2019-04-17 DIAGNOSIS — K29.50 CHRONIC GASTRITIS WITHOUT BLEEDING, UNSPECIFIED GASTRITIS TYPE: ICD-10-CM

## 2019-04-17 PROCEDURE — 99214 OFFICE O/P EST MOD 30 MIN: CPT | Performed by: NURSE PRACTITIONER

## 2019-04-17 RX ORDER — SUCRALFATE ORAL 1 G/10ML
1 SUSPENSION ORAL
Qty: 1260 ML | Refills: 0 | Status: SHIPPED | OUTPATIENT
Start: 2019-04-17 | End: 2021-03-04 | Stop reason: SDUPTHER

## 2019-04-17 RX ORDER — PANTOPRAZOLE SODIUM 40 MG/1
40 TABLET, DELAYED RELEASE ORAL DAILY
Qty: 30 TABLET | Refills: 5 | Status: SHIPPED | OUTPATIENT
Start: 2019-04-17 | End: 2019-06-06 | Stop reason: SDUPTHER

## 2019-04-17 NOTE — PROGRESS NOTES
Chief Complaint   Patient presents with   • Abdominal Pain   • Constipation   • Diarrhea       Subjective    Toshia Brad Barraza is a 24 y.o. female. she is here today for follow-up.    History of Present Illness  24-year-old female presents to discuss EGD and colonoscopy results.  Still reports some nausea early satiety and decreased appetite.  Her weight is up 5 pounds from last office visit.  EGD noted mildly severe esophagitis, gastritis normal duodenum.  Duodenal biopsy noted no significant abnormality.  Antrum of stomach biopsy noted chronic gastritis.  Negative for H. pylori.  Distal esophagus biopsy noted mild chronic esophagitis involving squamous mucosa.  Colonoscopy noted hemorrhoids otherwise normal exam.  Ileum biopsy and random colonic biopsy noted no significant histologic abnormality.  IMPRESSION:     In the right adnexa, posterior to the right side of the uterus is  a well-circumscribed cystic structure measuring 5.1 x 3.4 x 4.7  cm most likely a right ovarian cyst. Based on the size of this  cyst a follow-up pelvic ultrasound in 6-12 weeks interval is  requested.     There is a smaller 1.7 x 2.0 x 1.4 cm left adnexal cyst,  suggestive of a benign left ovarian cyst      There is significant constipation.     Electronically signed by:  Balbir Andino MD  3/21/2019 5:18 PM CDT  Workstation: [id number]  Patient has pelvic ultrasound planned for Friday and follow-up plan with her primary care provider.  Plan; we will start patient on PPI daily due to chronic esophagitis and gastritis.  Carafate before meals and at bedtime.  Discussed importance of avoidance of gastric irritants standard antireflux measures.  Follow-up in 3 months for recheck return office sooner if needed       The following portions of the patient's history were reviewed and updated as appropriate:   Past Medical History:   Diagnosis Date   • Acute bacterial sinusitis    • Distorted body image    • Early onset of delivery before 37  weeks    • Encounter for  visit     Post  care status   • Encounter for  visit      visit status   • Malaise and fatigue    • Primigravida      Past Surgical History:   Procedure Laterality Date   • BREAST AUGMENTATION     • BREAST BIOPSY     • COLONOSCOPY N/A 4/10/2019    Procedure: COLONOSCOPY;  Surgeon: Aidan Alvarado MD;  Location: [facility] ENDOSCOPY;  Service: Gastroenterology   • ENDOSCOPY N/A 4/10/2019    Procedure: ESOPHAGOGASTRODUODENOSCOPY Wed/Fri;  Surgeon: Aidan Alvarado MD;  Location: [facility] ENDOSCOPY;  Service: Gastroenterology     Family History   Problem Relation Age of Onset   • Other Mother         hematologic disorder   • Heart disease Mother         ischemic heart disease   • Heart attack Mother    • Leukemia Mother    • Ovarian cancer Mother         questionable   • Heart disease Father    • Hypertension Father      OB History      Para Term  AB Living    2 2 1 1   2    SAB TAB Ectopic Molar Multiple Live Births                       Obstetric Comments    The baby had gastroschisis        Prior to Admission medications    Medication Sig Start Date End Date Taking? Authorizing Provider   butalbital-acetaminophen  MG tablet tablet Take 1 tablet by mouth Every 6 (Six) Hours As Needed (headache). 19  Yes Ciera Chinchilla APRN   cyanocobalamin 1000 MCG/ML injection Inject 1 mL into the appropriate muscle as directed by prescriber Every 28 (Twenty-Eight) Days. 3/15/19  Yes Ciera Chinchilla APRN   cyclobenzaprine (FLEXERIL) 10 MG tablet Take 1 tablet by mouth 3 (Three) Times a Day As Needed for Muscle Spasms. 3/18/19  Yes Anastacio Bell MD   docusate sodium (COLACE) 100 MG capsule Take 1 capsule by mouth 2 (Two) Times a Day. 19  Yes Ciera Chinchilla APRN   fluticasone (FLONASE) 50 MCG/ACT nasal spray 2 sprays into the nostril(s) as directed by provider Daily. Administer 2 sprays in each nostril for each dose. 19  Yes  "Ciera Chinchilla APRN   meclizine (ANTIVERT) 12.5 MG tablet Take 1 tablet by mouth 3 (Three) Times a Day As Needed for dizziness. 3/5/19  Yes Ciera Chinchilla APRN   Misc. Devices (CERVICAL TRACTION) kit 1 Device Daily. 3/18/19  Yes Anastacio Bell MD   ondansetron ODT (ZOFRAN ODT) 4 MG disintegrating tablet Take 1 tablet by mouth Every 8 (Eight) Hours As Needed for Nausea or Vomiting. 3/5/19  Yes Ciera Chinchilla APRN   polyethylene glycol (MIRALAX) packet Take 17 g by mouth Daily As Needed (constipation). 4/4/19  Yes Ciera Chinchilla APRN   Syringe/Needle, Disp, 25G X 5/8\" 3 ML misc 1 syringe Every 28 (Twenty-Eight) Days. 3/15/19  Yes Ciera Chinchilla APRN   topiramate (TOPAMAX) 50 MG tablet Wk1:1tabinpm;wk2:1tabbid;wk3 1tabam,2tabpm;wk4:2tabbid 3/5/19  Yes Ciera Chinchilla APRN     No Known Allergies  Social History     Socioeconomic History   • Marital status: Single     Spouse name: Not on file   • Number of children: Not on file   • Years of education: Not on file   • Highest education level: Not on file   Tobacco Use   • Smoking status: Never Smoker   • Smokeless tobacco: Never Used   Substance and Sexual Activity   • Alcohol use: Yes     Comment: occasionally   • Drug use: No   • Sexual activity: Defer       Review of Systems  Review of Systems   Constitutional: Negative for activity change, appetite change, chills, diaphoresis, fatigue, fever and unexpected weight change.   HENT: Negative for sore throat and trouble swallowing.    Respiratory: Negative for shortness of breath.    Gastrointestinal: Positive for abdominal pain and nausea. Negative for abdominal distention, anal bleeding, blood in stool, constipation, diarrhea, rectal pain and vomiting.   Musculoskeletal: Negative for arthralgias.   Skin: Negative for pallor.   Neurological: Negative for light-headedness.        /72 (BP Location: Left arm)   Pulse 78   Ht 162.6 cm (64\")   Wt 53.2 kg (117 lb 3.2 oz)   LMP " 03/27/2019 (Approximate)   BMI 20.12 kg/m²     Objective    Physical Exam   Constitutional: She is oriented to person, place, and time. She appears well-developed and well-nourished. She is cooperative. No distress.   HENT:   Head: Normocephalic and atraumatic.   Neck: Normal range of motion. Neck supple. No thyromegaly present.   Cardiovascular: Normal rate, regular rhythm and normal heart sounds.   Pulmonary/Chest: Effort normal and breath sounds normal. She has no wheezes. She has no rhonchi. She has no rales.   Abdominal: Soft. Normal appearance and bowel sounds are normal. She exhibits no distension. There is no hepatosplenomegaly. There is no tenderness. There is no rigidity and no guarding. No hernia.   Lymphadenopathy:     She has no cervical adenopathy.   Neurological: She is alert and oriented to person, place, and time.   Skin: Skin is warm, dry and intact. No rash noted. No pallor.   Psychiatric: She has a normal mood and affect. Her speech is normal.     Admission on 04/10/2019, Discharged on 04/10/2019   Component Date Value Ref Range Status   • HCG, Urine QL 04/10/2019 Negative  Negative Final   • Case Report 04/10/2019    Final                    Value:Surgical Pathology Report                         Case: [id number]                                  Authorizing Provider:  Aidan Alvarado MD        Collected:           04/10/2019 12:36 PM          Ordering Location:     [facility]             Received:            04/10/2019 01:49 PM                                 [place] [facility]                                                     Pathologist:           Jeffrey Goldberg MD                                                         Specimens:   1) - Small Intestine, Duodenum                                                                      2) - Gastric, Antrum                                                                                3) - Esophagus, Distal                                                                               4) - Small Intestine, Ileum                                                                         5) - Large Intestine, colonic mucousa                                                     • Final Diagnosis 04/10/2019    Final                    Value:This result contains rich text formatting which cannot be displayed here.   • Comment 04/10/2019    Final                    Value:This result contains rich text formatting which cannot be displayed here.   • Gross Description 04/10/2019    Final                    Value:This result contains rich text formatting which cannot be displayed here.     Assessment/Plan      1. Gastroesophageal reflux disease with esophagitis    2. Chronic gastritis without bleeding, unspecified gastritis type    3. Early satiety    .       Orders placed during this encounter include:  No orders of the defined types were placed in this encounter.      * Surgery not found *    Review and/or summary of lab tests, radiology, procedures, medications. Review and summary of old records and obtaining of history. The risks and benefits of my recommendations, as well as other treatment options were discussed with the patient today. Questions were answered.    New Medications Ordered This Visit   Medications   • pantoprazole (PROTONIX) 40 MG EC tablet     Sig: Take 1 tablet by mouth Daily.     Dispense:  30 tablet     Refill:  5   • sucralfate (CARAFATE) 1 GM/10ML suspension     Sig: Take 10 mL by mouth 4 (Four) Times a Day With Meals & at Bedtime.     Dispense:  1260 mL     Refill:  0       Follow-up: Return in about 3 months (around 7/17/2019).          This document has been electronically signed by LATRICE Pearce on April 19, 2019 1:42 PM             Results for orders placed or performed during the hospital encounter of 04/10/19   Tissue Pathology Exam   Result Value Ref Range    Case Report       Surgical Pathology Report                          Case: [id number]                                  Authorizing Provider:  Aidan Alvarado MD        Collected:           04/10/2019 12:36 PM          Ordering Location:     [facility]             Received:            04/10/2019 01:49 PM                                 [place] [facility]                                                     Pathologist:           Jeffrey Goldberg MD                                                         Specimens:   1) - Small Intestine, Duodenum                                                                      2) - Gastric, Antrum                                                                                3) - Esophagus, Distal                                                                              4) - Small Intestine, Ileum                                                                         5) - Large Intestine, colonic mucousa                                                      Final Diagnosis       1.  MUCOSA, DUODENUM:   NO SIGNIFICANT HISTOLOGIC ABNORMALITY.     2.  MUCOSA, ANTRUM OF STOMACH:   CHRONIC GASTRITIS.   NEGATIVE FOR HELICOBACTER PYLORI (HP IMMUNOSTAIN).     3.  MUCOSA, DISTAL ESOPHAGUS:   MILD CHRONIC ESOPHAGITIS INVOLVING SQUAMOUS MUCOSA.    4.  MUCOSA, ILEUM:   NO SIGNIFICANT HISTOLOGIC ABNORMALITY.     5.  MUCOSA, COLON:   NO SIGNIFICANT HISTOLOGIC ABNORMALITY.       Comment       Helicobacter pylori (HP) immunostain is performed (block 2) because an appropriate inflammatory milieu is present and organisms are not seen on H & E stained slides.     HP immunostain was developed and its performance characteristics determined by [facility].  It has not been cleared or approved by the U.S.Food and Drug Administration.  The FDA has determined that such clearance of approval is not necessary.  This test is used for clinical purposes.  It should not be regarded as investigational or for research.  This  laboratory is certified under the Clinical Laboratory Amendments of 1988 (CLIA-88) as qualified to perform high complexity clinical laboratory testing.     All controls show appropriate reactivity.        Gross Description       Received for examination are 5 containers, each of which have nodular bits of white soft tissue measuring 0.3-0.5 cc in aggregate.  All specimens are embedded as labeled:  1A duodenum; 2A antrum of stomach; 3A distal esophagus; 4A ileum; 5A mucosa of colon.      Pregnancy, Urine - Urine, Clean Catch   Result Value Ref Range    HCG, Urine QL Negative Negative   Results for orders placed or performed in visit on 03/21/19   Pregnancy, Urine - Urine, Clean Catch   Result Value Ref Range    HCG, Urine QL Negative Negative   Results for orders placed or performed in visit on 03/11/19   CBC Auto Differential   Result Value Ref Range    WBC 7.34 3.20 - 9.80 10*3/mm3    RBC 4.38 3.77 - 5.16 10*6/mm3    Hemoglobin 13.0 12.0 - 15.5 g/dL    Hematocrit 38.7 35.0 - 45.0 %    MCV 88.4 80.0 - 98.0 fL    MCH 29.7 26.5 - 34.0 pg    MCHC 33.6 31.4 - 36.0 g/dL    RDW 12.2 11.5 - 14.5 %    RDW-SD 38.7 36.4 - 46.3 fl    MPV 11.3 8.0 - 12.0 fL    Platelets 200 150 - 450 10*3/mm3    Neutrophil % 66.9 37.0 - 80.0 %    Lymphocyte % 23.4 10.0 - 50.0 %    Monocyte % 7.1 0.0 - 12.0 %    Eosinophil % 2.5 0.0 - 7.0 %    Basophil % 0.1 0.0 - 2.0 %    Neutrophils, Absolute 4.91 2.00 - 8.60 10*3/mm3    Lymphocytes, Absolute 1.72 0.60 - 4.20 10*3/mm3    Monocytes, Absolute 0.52 0.00 - 0.90 10*3/mm3    Eosinophils, Absolute 0.18 0.00 - 0.70 10*3/mm3    Basophils, Absolute 0.01 0.00 - 0.20 10*3/mm3   Vitamin B12   Result Value Ref Range    Vitamin B-12 419 239 - 931 pg/mL   Lipid Panel   Result Value Ref Range    Total Cholesterol 133 (L) 150 - 200 mg/dL    Triglycerides 64 <=150 mg/dL    HDL Cholesterol 76 (H) 40 - 59 mg/dL    LDL Cholesterol  44 <=100 mg/dL    VLDL Cholesterol 12.8 mg/dL    LDL/HDL Ratio 0.58 0.00 - 3.22    Comprehensive Metabolic Panel   Result Value Ref Range    Glucose 91 74 - 99 mg/dL    BUN 13 7 - 17 mg/dL    Creatinine 0.64 0.52 - 1.04 mg/dL    Sodium 138 137 - 145 mmol/L    Potassium 3.8 3.4 - 5.0 mmol/L    Chloride 105 98 - 107 mmol/L    CO2 20.0 (L) 22.0 - 30.0 mmol/L    Calcium 8.5 8.4 - 10.2 mg/dL    Total Protein 7.4 6.3 - 8.2 g/dL    Albumin 4.70 3.50 - 5.00 g/dL    ALT (SGPT) 17 <=35 U/L    AST (SGOT) 16 14 - 36 U/L    Alkaline Phosphatase 58 38 - 126 U/L    Total Bilirubin 0.5 0.2 - 1.3 mg/dL    eGFR Non  Amer 116 71 - 165 mL/min/1.73    Globulin 2.7 2.3 - 3.5 gm/dL    A/G Ratio 1.7 1.1 - 1.8 g/dL    BUN/Creatinine Ratio 20.3 7.0 - 25.0    Anion Gap 13.0 5.0 - 15.0 mmol/L   Results for orders placed or performed in visit on 03/07/19   Thyroid Antibodies   Result Value Ref Range    Thyroid Peroxidase Antibody 9 0 - 34 IU/mL    Thyroglobulin Ab <1.0 0.0 - 0.9 IU/mL   T3   Result Value Ref Range    T3, Total 126.0 97.0 - 169.0 ng/dl   T3, Uptake   Result Value Ref Range    T3 Uptake 27 24 - 39 %   Results for orders placed or performed in visit on 03/05/19   Adult Transthoracic Echo Complete W/ Cont if Necessary Per Protocol   Result Value Ref Range    BSA 1.6 m^2    IVSd 0.83 cm    IVSs 0.74 cm    LVIDd 3.5 cm    LVIDs 2.3 cm    LVPWd 0.74 cm    BH CV ECHO ANGELIQUE - LVPWS 0.98 cm    IVS/LVPW 1.1     FS 34.6 %    EDV(Teich) 49.8 ml    ESV(Teich) 17.5 ml    EF(Teich) 64.8 %    EDV(cubed) 41.8 ml    ESV(cubed) 11.7 ml    EF(cubed) 72.0 %    % IVS thick -11.2 %    % LVPW thick 33.4 %    LV mass(C)d 72.2 grams    LV mass(C)dI 46.2 grams/m^2    LV mass(C)s 43.7 grams    LV mass(C)sI 28.0 grams/m^2    SV(Teich) 32.3 ml    SI(Teich) 20.7 ml/m^2    SV(cubed) 30.1 ml    SI(cubed) 19.2 ml/m^2    Ao root diam 2.7 cm    Ao root area 5.5 cm^2    ACS 1.8 cm    LA dimension 3.0 cm    LA/Ao 1.1     LVOT diam 1.9 cm    LVOT area 2.8 cm^2    LVOT area(traced) 2.8 cm^2    Ao root area (BSA corrected) 1.7     MV E max  nahid 87.3 cm/sec    MV A max nahid 50.9 cm/sec    MV E/A 1.7     MV dec slope 409.0 cm/sec^2    Ao pk nahid 100.0 cm/sec    Ao max PG 4.0 mmHg    Ao max PG (full) 0 mmHg    Ao V2 mean 67.7 cm/sec    Ao mean PG 2.0 mmHg    Ao mean PG (full) 0 mmHg    Ao V2 VTI 20.1 cm    WALLACE(I,A) 3.0 cm^2    WALLACE(I,D) 3.0 cm^2    WALLACE(V,A) 2.8 cm^2    WALLACE(V,D) 2.8 cm^2    LV V1 max PG 4.0 mmHg    LV V1 mean PG 2.0 mmHg    LV V1 max 100.0 cm/sec    LV V1 mean 70.1 cm/sec    LV V1 VTI 21.5 cm    MR max nahid 259.0 cm/sec    MR max PG 26.8 mmHg    SV(Ao) 110.9 ml    SI(Ao) 70.9 ml/m^2    SV(LVOT) 61.0 ml    SI(LVOT) 39.0 ml/m^2    PA V2 max 75.4 cm/sec    PA max PG 2.3 mmHg    PA V2 mean 42.4 cm/sec    PA mean PG 1.0 mmHg    PA V2 VTI 12.5 cm    TR max nahid 179.5 cm/sec    RVSP(TR) 23.4 mmHg    RAP systole 10.0 mmHg     CV ECHO ANGELIQUE - BZI_BMI 20.3 kilograms/m^2     CV ECHO ANGELIQUE - BSA(HAYCOCK) 1.6 m^2     CV ECHO ANGELIQUE - BZI_METRIC_WEIGHT 53.5 kg     CV ECHO ANGELIQUE - BZI_METRIC_HEIGHT 162.6 cm    Target HR (85%) 168 bpm    Max. Pred. HR (100%) 198 bpm    Echo EF Estimated 60 %     *Note: Due to a large number of results and/or encounters for the requested time period, some results have not been displayed. A complete set of results can be found in Results Review.

## 2019-04-17 NOTE — PATIENT INSTRUCTIONS

## 2019-04-22 ENCOUNTER — OFFICE VISIT (OUTPATIENT)
Dept: FAMILY MEDICINE CLINIC | Facility: CLINIC | Age: 24
End: 2019-04-22

## 2019-04-22 VITALS
OXYGEN SATURATION: 98 % | HEART RATE: 84 BPM | TEMPERATURE: 98.7 F | RESPIRATION RATE: 14 BRPM | BODY MASS INDEX: 19.97 KG/M2 | WEIGHT: 117 LBS | HEIGHT: 64 IN

## 2019-04-22 DIAGNOSIS — K59.09 OTHER CONSTIPATION: ICD-10-CM

## 2019-04-22 DIAGNOSIS — R25.2 CRAMPING OF FEET: ICD-10-CM

## 2019-04-22 DIAGNOSIS — R20.9 BILATERAL COLD FEET: ICD-10-CM

## 2019-04-22 DIAGNOSIS — R59.0 SUBMANDIBULAR LYMPHADENOPATHY: Primary | ICD-10-CM

## 2019-04-22 DIAGNOSIS — R00.0 RACING HEART BEAT: ICD-10-CM

## 2019-04-22 DIAGNOSIS — K29.00 ACUTE SUPERFICIAL GASTRITIS WITHOUT HEMORRHAGE: ICD-10-CM

## 2019-04-22 DIAGNOSIS — R19.4 CHANGE IN BOWEL HABITS: ICD-10-CM

## 2019-04-22 DIAGNOSIS — R63.5 RECENT WEIGHT GAIN: ICD-10-CM

## 2019-04-22 DIAGNOSIS — R10.84 GENERALIZED ABDOMINAL PAIN: ICD-10-CM

## 2019-04-22 DIAGNOSIS — F41.9 ANXIETY: ICD-10-CM

## 2019-04-22 DIAGNOSIS — Z82.49 FAMILY HISTORY OF HEART DISEASE: ICD-10-CM

## 2019-04-22 DIAGNOSIS — G43.809 OTHER MIGRAINE WITHOUT STATUS MIGRAINOSUS, NOT INTRACTABLE: ICD-10-CM

## 2019-04-22 DIAGNOSIS — R42 DIZZINESS: ICD-10-CM

## 2019-04-22 DIAGNOSIS — R68.81 EARLY SATIETY: ICD-10-CM

## 2019-04-22 DIAGNOSIS — R20.0 NUMBNESS OF BOTH LOWER EXTREMITIES: ICD-10-CM

## 2019-04-22 DIAGNOSIS — R19.8 ABDOMINAL FULLNESS: ICD-10-CM

## 2019-04-22 DIAGNOSIS — K20.90 ESOPHAGITIS: ICD-10-CM

## 2019-04-22 DIAGNOSIS — R11.0 NAUSEA: ICD-10-CM

## 2019-04-22 DIAGNOSIS — R47.9 DIFFICULTY WITH SPEECH: ICD-10-CM

## 2019-04-22 DIAGNOSIS — R53.83 OTHER FATIGUE: ICD-10-CM

## 2019-04-22 DIAGNOSIS — R59.0 PERIAURICULAR LYMPHADENOPATHY: ICD-10-CM

## 2019-04-22 PROCEDURE — 99214 OFFICE O/P EST MOD 30 MIN: CPT | Performed by: NURSE PRACTITIONER
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
## 2019-04-28 PROBLEM — K20.90 ESOPHAGITIS: Status: ACTIVE | Noted: 2019-04-28

## 2019-04-28 PROBLEM — R63.4 WEIGHT LOSS: Status: RESOLVED | Noted: 2017-10-02 | Resolved: 2019-04-28

## 2019-04-28 PROBLEM — K59.09 OTHER CONSTIPATION: Status: ACTIVE | Noted: 2019-04-28

## 2019-04-28 PROBLEM — K29.00 ACUTE SUPERFICIAL GASTRITIS WITHOUT HEMORRHAGE: Status: ACTIVE | Noted: 2019-04-28
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
## 2019-06-06 ENCOUNTER — OFFICE VISIT (OUTPATIENT)
Dept: FAMILY MEDICINE CLINIC | Facility: CLINIC | Age: 24
End: 2019-06-06

## 2019-06-06 VITALS
OXYGEN SATURATION: 100 % | HEIGHT: 64 IN | BODY MASS INDEX: 20.49 KG/M2 | WEIGHT: 120 LBS | RESPIRATION RATE: 18 BRPM | TEMPERATURE: 97.8 F | HEART RATE: 98 BPM | DIASTOLIC BLOOD PRESSURE: 68 MMHG | SYSTOLIC BLOOD PRESSURE: 112 MMHG

## 2019-06-06 DIAGNOSIS — R00.0 RACING HEART BEAT: Primary | ICD-10-CM

## 2019-06-06 DIAGNOSIS — K29.00 ACUTE SUPERFICIAL GASTRITIS WITHOUT HEMORRHAGE: ICD-10-CM

## 2019-06-06 DIAGNOSIS — R42 DIZZINESS: ICD-10-CM

## 2019-06-06 DIAGNOSIS — R59.0 SUBMANDIBULAR LYMPHADENOPATHY: ICD-10-CM

## 2019-06-06 DIAGNOSIS — R59.0 PERIAURICULAR LYMPHADENOPATHY: ICD-10-CM

## 2019-06-06 DIAGNOSIS — K59.09 OTHER CONSTIPATION: ICD-10-CM

## 2019-06-06 PROCEDURE — 99214 OFFICE O/P EST MOD 30 MIN: CPT | Performed by: NURSE PRACTITIONER

## 2019-06-06 RX ORDER — HYDROXYZINE HYDROCHLORIDE 10 MG/1
10 TABLET, FILM COATED ORAL 3 TIMES DAILY PRN
Qty: 90 TABLET | Refills: 0 | Status: SHIPPED | OUTPATIENT
Start: 2019-06-06 | End: 2019-09-04

## 2019-06-06 RX ORDER — PANTOPRAZOLE SODIUM 40 MG/1
40 TABLET, DELAYED RELEASE ORAL 2 TIMES DAILY
Qty: 60 TABLET | Refills: 5 | Status: SHIPPED | OUTPATIENT
Start: 2019-06-06 | End: 2019-07-12

## 2019-06-06 RX ORDER — ESCITALOPRAM OXALATE 10 MG/1
10 TABLET ORAL DAILY
Qty: 30 TABLET | Refills: 0 | Status: SHIPPED | OUTPATIENT
Start: 2019-06-06 | End: 2021-04-13
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
## 2019-06-12 ENCOUNTER — OFFICE VISIT (OUTPATIENT)
Dept: GASTROENTEROLOGY | Facility: CLINIC | Age: 24
End: 2019-06-12

## 2019-06-12 VITALS
BODY MASS INDEX: 19.94 KG/M2 | DIASTOLIC BLOOD PRESSURE: 72 MMHG | HEIGHT: 64 IN | WEIGHT: 116.8 LBS | SYSTOLIC BLOOD PRESSURE: 122 MMHG | HEART RATE: 82 BPM

## 2019-06-12 DIAGNOSIS — R11.0 NAUSEA: Primary | ICD-10-CM

## 2019-06-12 DIAGNOSIS — K21.00 GASTROESOPHAGEAL REFLUX DISEASE WITH ESOPHAGITIS: ICD-10-CM

## 2019-06-12 DIAGNOSIS — R10.10 UPPER ABDOMINAL PAIN: ICD-10-CM

## 2019-06-12 PROCEDURE — 99213 OFFICE O/P EST LOW 20 MIN: CPT | Performed by: NURSE PRACTITIONER

## 2019-06-12 NOTE — PROGRESS NOTES
Chief Complaint   Patient presents with   • Abdominal Pain   • Constipation   • Diarrhea       Subjective    Toshia Barraza is a 24 y.o. female. she is here today for follow-up.    History of Present Illness  24-year-old female presents for follow-up.  Still having some nausea early satiety and decreased appetite.  Her weight is down 4 pounds from last week but only 1 pound from office visit in April.  States she cannot tell a difference with current PPI.  Has recently been cleared per cardiology.  Describes epigastric pain and sensation of tongue burning that radiates to her jaw.  She has not seen ENT.  Her previous EGD noted mildly severe esophagitis, gastritis normal duodenum.  Denies any melena or hematochezia.  States she has been taking Carafate before meals and at bedtime.  Plan; discontinue current PPI and start patient on Dexilant.  Discussed possible referral to ENT if symptoms persist or worsen.  Return to GI office in 1 month sooner if needed  We will obtain ultrasound of gallbladder due to intermittent nausea     The following portions of the patient's history were reviewed and updated as appropriate:   Past Medical History:   Diagnosis Date   • Acute bacterial sinusitis    • Distorted body image    • Early onset of delivery before 37 weeks    • Encounter for  visit     Post mague care status   • Encounter for  visit      visit status   • Esophagitis 2019   • Malaise and fatigue    • Primigravida      Past Surgical History:   Procedure Laterality Date   • BREAST AUGMENTATION     • BREAST BIOPSY     • COLONOSCOPY N/A 4/10/2019    Procedure: COLONOSCOPY;  Surgeon: Aidan Alvarado MD;  Location: [facility] ENDOSCOPY;  Service: Gastroenterology   • ENDOSCOPY N/A 4/10/2019    Procedure: ESOPHAGOGASTRODUODENOSCOPY Wed/Fri;  Surgeon: Aidan Alvarado MD;  Location: [facility] ENDOSCOPY;  Service: Gastroenterology     Family History   Problem Relation Age of Onset   • Other Mother          hematologic disorder   • Heart disease Mother         ischemic heart disease   • Heart attack Mother    • Leukemia Mother    • Ovarian cancer Mother         questionable   • Heart disease Father    • Hypertension Father      OB History      Para Term  AB Living    2 2 1 1   2    SAB TAB Ectopic Molar Multiple Live Births                       Obstetric Comments    The baby had gastroschisis        Prior to Admission medications    Medication Sig Start Date End Date Taking? Authorizing Provider   butalbital-acetaminophen  MG tablet tablet Take 1 tablet by mouth Every 6 (Six) Hours As Needed (headache). 19  Yes Ciera Chinchilla APRN   cyanocobalamin 1000 MCG/ML injection Inject 1 mL into the appropriate muscle as directed by prescriber Every 28 (Twenty-Eight) Days. 3/15/19  Yes Ciera Chinchilla APRN   cyclobenzaprine (FLEXERIL) 10 MG tablet Take 1 tablet by mouth 3 (Three) Times a Day As Needed for Muscle Spasms. 3/18/19  Yes Anastacio Bell MD   docusate sodium (COLACE) 100 MG capsule Take 1 capsule by mouth 2 (Two) Times a Day. 19  Yes Cirea Chinchilla APRN   escitalopram (LEXAPRO) 10 MG tablet Take 1 tablet by mouth Daily. 19  Yes Ciera Chinchilla APRN   fluticasone (FLONASE) 50 MCG/ACT nasal spray 2 sprays into the nostril(s) as directed by provider Daily. Administer 2 sprays in each nostril for each dose. 19  Yes Ciera Chinchilla APRN   hydrOXYzine (ATARAX) 10 MG tablet Take 1 tablet by mouth 3 (Three) Times a Day As Needed for Anxiety. 19  Yes Ciera Chinchilla APRN   meclizine (ANTIVERT) 12.5 MG tablet Take 1 tablet by mouth 3 (Three) Times a Day As Needed for dizziness. 3/5/19  Yes Ciera Chinchilla APRN   metoprolol tartrate (LOPRESSOR) 25 MG tablet take 1 tablet by mouth twice a day   TAKE 1 TABLET THE NIGHT BEFO...  (REFER TO PRESCRIPTION NOTES). 19  Yes Provider, MD Jane   Misc. Devices (CERVICAL TRACTION) kit 1 Device  "Daily. 3/18/19  Yes Anastacio Bell MD   ondansetron ODT (ZOFRAN ODT) 4 MG disintegrating tablet Take 1 tablet by mouth Every 8 (Eight) Hours As Needed for Nausea or Vomiting. 3/5/19  Yes Ciera Chinchilla APRN   pantoprazole (PROTONIX) 40 MG EC tablet Take 1 tablet by mouth 2 (Two) Times a Day. 6/6/19  Yes Ciera Chinchilla APRN   polyethylene glycol (MIRALAX) packet Take 17 g by mouth Daily As Needed (constipation). 4/4/19  Yes Ciera Chinchilla APRN   sucralfate (CARAFATE) 1 GM/10ML suspension Take 10 mL by mouth 4 (Four) Times a Day With Meals & at Bedtime. 4/17/19  Yes Aaliyah Arrieta APRN   Syringe/Needle, Disp, 25G X 5/8\" 3 ML misc 1 syringe Every 28 (Twenty-Eight) Days. 3/15/19  Yes Ciera Chinchilla APRN   topiramate (TOPAMAX) 50 MG tablet Wk1:1tabinpm;wk2:1tabbid;wk3 1tabam,2tabpm;wk4:2tabbid 3/5/19  Yes Ciera Chinchilla APRN     No Known Allergies  Social History     Socioeconomic History   • Marital status: Single     Spouse name: Not on file   • Number of children: Not on file   • Years of education: Not on file   • Highest education level: Not on file   Tobacco Use   • Smoking status: Never Smoker   • Smokeless tobacco: Never Used   Substance and Sexual Activity   • Alcohol use: Yes     Comment: occasionally   • Drug use: No   • Sexual activity: Defer       Review of Systems  Review of Systems   Constitutional: Positive for fatigue. Negative for activity change, appetite change, chills, diaphoresis, fever and unexpected weight change.   HENT: Negative for sore throat and trouble swallowing.    Respiratory: Negative for shortness of breath.    Gastrointestinal: Positive for abdominal pain and nausea. Negative for abdominal distention, anal bleeding, blood in stool, constipation, diarrhea, rectal pain and vomiting.   Musculoskeletal: Negative for arthralgias.   Skin: Negative for pallor.   Neurological: Negative for light-headedness.        /72 (BP Location: Left arm)   Pulse " "82   Ht 162.6 cm (64\")   Wt 53 kg (116 lb 12.8 oz)   BMI 20.05 kg/m²     Objective    Physical Exam   Constitutional: She is oriented to person, place, and time. She appears well-developed and well-nourished. She is cooperative. No distress.   HENT:   Head: Normocephalic and atraumatic.   Neck: Normal range of motion. Neck supple. No thyromegaly present.   Cardiovascular: Normal rate, regular rhythm and normal heart sounds.   Pulmonary/Chest: Effort normal and breath sounds normal. She has no wheezes. She has no rhonchi. She has no rales.   Abdominal: Soft. Normal appearance and bowel sounds are normal. She exhibits no distension. There is no hepatosplenomegaly. There is no tenderness. There is no rigidity and no guarding. No hernia.   Lymphadenopathy:     She has no cervical adenopathy.   Neurological: She is alert and oriented to person, place, and time.   Skin: Skin is warm, dry and intact. No rash noted. No pallor.   Psychiatric: She has a normal mood and affect. Her speech is normal.     Admission on 04/10/2019, Discharged on 04/10/2019   Component Date Value Ref Range Status   • HCG, Urine QL 04/10/2019 Negative  Negative Final   • Case Report 04/10/2019    Final                    Value:Surgical Pathology Report                         Case: [id number]                                  Authorizing Provider:  Aidan Alvarado MD        Collected:           04/10/2019 12:36 PM          Ordering Location:     [facility]             Received:            04/10/2019 01:49 PM                                 [facility]                                                     Pathologist:           Jeffrey Goldberg MD                                                         Specimens:   1) - Small Intestine, Duodenum                                                                      2) - Gastric, Antrum                                                                                3) - Esophagus, " Distal                                                                              4) - Small Intestine, Ileum                                                                         5) - Large Intestine, colonic mucousa                                                     • Final Diagnosis 04/10/2019    Final                    Value:This result contains rich text formatting which cannot be displayed here.   • Comment 04/10/2019    Final                    Value:This result contains rich text formatting which cannot be displayed here.   • Gross Description 04/10/2019    Final                    Value:This result contains rich text formatting which cannot be displayed here.     Assessment/Plan      1. Nausea    2. Upper abdominal pain    3. Gastroesophageal reflux disease with esophagitis    .       Orders placed during this encounter include:  Orders Placed This Encounter   Procedures   • US Abdomen Complete     Standing Status:   Future     Standing Expiration Date:   6/12/2020     Order Specific Question:   Reason for Exam:     Answer:   abd pain/nausea       * Surgery not found *    Review and/or summary of lab tests, radiology, procedures, medications. Review and summary of old records and obtaining of history. The risks and benefits of my recommendations, as well as other treatment options were discussed with the patient today. Questions were answered.    No orders of the defined types were placed in this encounter.      Follow-up: Return in about 4 weeks (around 7/10/2019).          This document has been electronically signed by LATRICE Pearce on June 13, 2019 8:23 AM             Results for orders placed or performed during the hospital encounter of 04/10/19   Tissue Pathology Exam   Result Value Ref Range    Case Report       Surgical Pathology Report                         Case: [id number]                                  Authorizing Provider:  Aidan Alvarado MD        Collected:            04/10/2019 12:36 PM          Ordering Location:     [facility]             Received:            04/10/2019 01:49 PM                                 [facility]                                                     Pathologist:           Jeffrey Goldberg MD                                                         Specimens:   1) - Small Intestine, Duodenum                                                                      2) - Gastric, Antrum                                                                                3) - Esophagus, Distal                                                                              4) - Small Intestine, Ileum                                                                         5) - Large Intestine, colonic mucousa                                                      Final Diagnosis       1.  MUCOSA, DUODENUM:   NO SIGNIFICANT HISTOLOGIC ABNORMALITY.     2.  MUCOSA, ANTRUM OF STOMACH:   CHRONIC GASTRITIS.   NEGATIVE FOR HELICOBACTER PYLORI (HP IMMUNOSTAIN).     3.  MUCOSA, DISTAL ESOPHAGUS:   MILD CHRONIC ESOPHAGITIS INVOLVING SQUAMOUS MUCOSA.    4.  MUCOSA, ILEUM:   NO SIGNIFICANT HISTOLOGIC ABNORMALITY.     5.  MUCOSA, COLON:   NO SIGNIFICANT HISTOLOGIC ABNORMALITY.       Comment       Helicobacter pylori (HP) immunostain is performed (block 2) because an appropriate inflammatory milieu is present and organisms are not seen on H & E stained slides.     HP immunostain was developed and its performance characteristics determined by [facility].  It has not been cleared or approved by the U.S.Food and Drug Administration.  The FDA has determined that such clearance of approval is not necessary.  This test is used for clinical purposes.  It should not be regarded as investigational or for research.  This laboratory is certified under the Clinical Laboratory Amendments of 1988 (CLIA-88) as qualified to perform high complexity  clinical laboratory testing.     All controls show appropriate reactivity.        Gross Description       Received for examination are 5 containers, each of which have nodular bits of white soft tissue measuring 0.3-0.5 cc in aggregate.  All specimens are embedded as labeled:  1A duodenum; 2A antrum of stomach; 3A distal esophagus; 4A ileum; 5A mucosa of colon.      Pregnancy, Urine - Urine, Clean Catch   Result Value Ref Range    HCG, Urine QL Negative Negative   Results for orders placed or performed in visit on 03/21/19   Pregnancy, Urine - Urine, Clean Catch   Result Value Ref Range    HCG, Urine QL Negative Negative   Results for orders placed or performed in visit on 03/11/19   CBC Auto Differential   Result Value Ref Range    WBC 7.34 3.20 - 9.80 10*3/mm3    RBC 4.38 3.77 - 5.16 10*6/mm3    Hemoglobin 13.0 12.0 - 15.5 g/dL    Hematocrit 38.7 35.0 - 45.0 %    MCV 88.4 80.0 - 98.0 fL    MCH 29.7 26.5 - 34.0 pg    MCHC 33.6 31.4 - 36.0 g/dL    RDW 12.2 11.5 - 14.5 %    RDW-SD 38.7 36.4 - 46.3 fl    MPV 11.3 8.0 - 12.0 fL    Platelets 200 150 - 450 10*3/mm3    Neutrophil % 66.9 37.0 - 80.0 %    Lymphocyte % 23.4 10.0 - 50.0 %    Monocyte % 7.1 0.0 - 12.0 %    Eosinophil % 2.5 0.0 - 7.0 %    Basophil % 0.1 0.0 - 2.0 %    Neutrophils, Absolute 4.91 2.00 - 8.60 10*3/mm3    Lymphocytes, Absolute 1.72 0.60 - 4.20 10*3/mm3    Monocytes, Absolute 0.52 0.00 - 0.90 10*3/mm3    Eosinophils, Absolute 0.18 0.00 - 0.70 10*3/mm3    Basophils, Absolute 0.01 0.00 - 0.20 10*3/mm3   Vitamin B12   Result Value Ref Range    Vitamin B-12 419 239 - 931 pg/mL   Lipid Panel   Result Value Ref Range    Total Cholesterol 133 (L) 150 - 200 mg/dL    Triglycerides 64 <=150 mg/dL    HDL Cholesterol 76 (H) 40 - 59 mg/dL    LDL Cholesterol  44 <=100 mg/dL    VLDL Cholesterol 12.8 mg/dL    LDL/HDL Ratio 0.58 0.00 - 3.22   Comprehensive Metabolic Panel   Result Value Ref Range    Glucose 91 74 - 99 mg/dL    BUN 13 7 - 17 mg/dL    Creatinine 0.64  0.52 - 1.04 mg/dL    Sodium 138 137 - 145 mmol/L    Potassium 3.8 3.4 - 5.0 mmol/L    Chloride 105 98 - 107 mmol/L    CO2 20.0 (L) 22.0 - 30.0 mmol/L    Calcium 8.5 8.4 - 10.2 mg/dL    Total Protein 7.4 6.3 - 8.2 g/dL    Albumin 4.70 3.50 - 5.00 g/dL    ALT (SGPT) 17 <=35 U/L    AST (SGOT) 16 14 - 36 U/L    Alkaline Phosphatase 58 38 - 126 U/L    Total Bilirubin 0.5 0.2 - 1.3 mg/dL    eGFR Non  Amer 116 71 - 165 mL/min/1.73    Globulin 2.7 2.3 - 3.5 gm/dL    A/G Ratio 1.7 1.1 - 1.8 g/dL    BUN/Creatinine Ratio 20.3 7.0 - 25.0    Anion Gap 13.0 5.0 - 15.0 mmol/L   Results for orders placed or performed in visit on 03/07/19   Thyroid Antibodies   Result Value Ref Range    Thyroid Peroxidase Antibody 9 0 - 34 IU/mL    Thyroglobulin Ab <1.0 0.0 - 0.9 IU/mL   T3   Result Value Ref Range    T3, Total 126.0 97.0 - 169.0 ng/dl   T3, Uptake   Result Value Ref Range    T3 Uptake 27 24 - 39 %   Results for orders placed or performed in visit on 03/05/19   Adult Transthoracic Echo Complete W/ Cont if Necessary Per Protocol   Result Value Ref Range    BSA 1.6 m^2    IVSd 0.83 cm    IVSs 0.74 cm    LVIDd 3.5 cm    LVIDs 2.3 cm    LVPWd 0.74 cm    BH CV ECHO ANGELIQUE - LVPWS 0.98 cm    IVS/LVPW 1.1     FS 34.6 %    EDV(Teich) 49.8 ml    ESV(Teich) 17.5 ml    EF(Teich) 64.8 %    EDV(cubed) 41.8 ml    ESV(cubed) 11.7 ml    EF(cubed) 72.0 %    % IVS thick -11.2 %    % LVPW thick 33.4 %    LV mass(C)d 72.2 grams    LV mass(C)dI 46.2 grams/m^2    LV mass(C)s 43.7 grams    LV mass(C)sI 28.0 grams/m^2    SV(Teich) 32.3 ml    SI(Teich) 20.7 ml/m^2    SV(cubed) 30.1 ml    SI(cubed) 19.2 ml/m^2    Ao root diam 2.7 cm    Ao root area 5.5 cm^2    ACS 1.8 cm    LA dimension 3.0 cm    LA/Ao 1.1     LVOT diam 1.9 cm    LVOT area 2.8 cm^2    LVOT area(traced) 2.8 cm^2    Ao root area (BSA corrected) 1.7     MV E max nahid 87.3 cm/sec    MV A max nahid 50.9 cm/sec    MV E/A 1.7     MV dec slope 409.0 cm/sec^2    Ao pk nahid 100.0 cm/sec    Ao max PG  4.0 mmHg    Ao max PG (full) 0 mmHg    Ao V2 mean 67.7 cm/sec    Ao mean PG 2.0 mmHg    Ao mean PG (full) 0 mmHg    Ao V2 VTI 20.1 cm    WALLACE(I,A) 3.0 cm^2    WALLACE(I,D) 3.0 cm^2    WALLACE(V,A) 2.8 cm^2    WALLACE(V,D) 2.8 cm^2    LV V1 max PG 4.0 mmHg    LV V1 mean PG 2.0 mmHg    LV V1 max 100.0 cm/sec    LV V1 mean 70.1 cm/sec    LV V1 VTI 21.5 cm    MR max nahid 259.0 cm/sec    MR max PG 26.8 mmHg    SV(Ao) 110.9 ml    SI(Ao) 70.9 ml/m^2    SV(LVOT) 61.0 ml    SI(LVOT) 39.0 ml/m^2    PA V2 max 75.4 cm/sec    PA max PG 2.3 mmHg    PA V2 mean 42.4 cm/sec    PA mean PG 1.0 mmHg    PA V2 VTI 12.5 cm    TR max nahid 179.5 cm/sec    RVSP(TR) 23.4 mmHg    RAP systole 10.0 mmHg     CV ECHO ANGELIQUE - BZI_BMI 20.3 kilograms/m^2     CV ECHO ANGELIQUE - BSA(HAYCOCK) 1.6 m^2     CV ECHO ANGELIQUE - BZI_METRIC_WEIGHT 53.5 kg     CV ECHO ANGELIQUE - BZI_METRIC_HEIGHT 162.6 cm    Target HR (85%) 168 bpm    Max. Pred. HR (100%) 198 bpm    Echo EF Estimated 60 %     *Note: Due to a large number of results and/or encounters for the requested time period, some results have not been displayed. A complete set of results can be found in Results Review.

## 2019-06-12 NOTE — PATIENT INSTRUCTIONS
Constipation, Adult  Constipation is when a person has fewer bowel movements in a week than normal, has difficulty having a bowel movement, or has stools that are dry, hard, or larger than normal. Constipation may be caused by an underlying condition. It may become worse with age if a person takes certain medicines and does not take in enough fluids.  Follow these instructions at home:  Eating and drinking    · Eat foods that have a lot of fiber, such as fresh fruits and vegetables, whole grains, and beans.  · Limit foods that are high in fat, low in fiber, or overly processed, such as french fries, hamburgers, cookies, candies, and soda.  · Drink enough fluid to keep your urine clear or pale yellow.  General instructions  · Exercise regularly or as told by your health care provider.  · Go to the restroom when you have the urge to go. Do not hold it in.  · Take over-the-counter and prescription medicines only as told by your health care provider. These include any fiber supplements.  · Practice pelvic floor retraining exercises, such as deep breathing while relaxing the lower abdomen and pelvic floor relaxation during bowel movements.  · Watch your condition for any changes.  · Keep all follow-up visits as told by your health care provider. This is important.  Contact a health care provider if:  · You have pain that gets worse.  · You have a fever.  · You do not have a bowel movement after 4 days.  · You vomit.  · You are not hungry.  · You lose weight.  · You are bleeding from the anus.  · You have thin, pencil-like stools.  Get help right away if:  · You have a fever and your symptoms suddenly get worse.  · You leak stool or have blood in your stool.  · Your abdomen is bloated.  · You have severe pain in your abdomen.  · You feel dizzy or you faint.  This information is not intended to replace advice given to you by your health care provider. Make sure you discuss any questions you have with your health care  provider.  Document Released: 09/15/2005 Document Revised: 07/07/2017 Document Reviewed: 06/07/2017  [employer] Patient Education © 2019 [employer]

## 2019-06-21 ENCOUNTER — HOSPITAL ENCOUNTER (OUTPATIENT)
Dept: ULTRASOUND IMAGING | Facility: HOSPITAL | Age: 24
Discharge: HOME | End: 2019-06-21
Admitting: NURSE PRACTITIONER

## 2019-06-21 DIAGNOSIS — R11.0 NAUSEA: ICD-10-CM

## 2019-06-21 DIAGNOSIS — R10.10 UPPER ABDOMINAL PAIN: ICD-10-CM

## 2019-06-21 PROCEDURE — 76700 US EXAM ABDOM COMPLETE: CPT

## 2019-07-12 ENCOUNTER — OFFICE VISIT (OUTPATIENT)
Dept: GASTROENTEROLOGY | Facility: CLINIC | Age: 24
End: 2019-07-12

## 2019-07-12 VITALS
BODY MASS INDEX: 20.22 KG/M2 | HEIGHT: 64 IN | DIASTOLIC BLOOD PRESSURE: 68 MMHG | WEIGHT: 118.4 LBS | HEART RATE: 84 BPM | SYSTOLIC BLOOD PRESSURE: 108 MMHG

## 2019-07-12 DIAGNOSIS — R11.0 NAUSEA: Primary | ICD-10-CM

## 2019-07-12 DIAGNOSIS — R10.10 UPPER ABDOMINAL PAIN: ICD-10-CM

## 2019-07-12 DIAGNOSIS — K21.00 GASTROESOPHAGEAL REFLUX DISEASE WITH ESOPHAGITIS: ICD-10-CM

## 2019-07-12 PROCEDURE — 99214 OFFICE O/P EST MOD 30 MIN: CPT | Performed by: NURSE PRACTITIONER

## 2019-07-12 RX ORDER — DEXLANSOPRAZOLE 60 MG/1
60 CAPSULE, DELAYED RELEASE ORAL DAILY
Qty: 30 CAPSULE | Refills: 5 | Status: SHIPPED | OUTPATIENT
Start: 2019-07-12 | End: 2020-01-08

## 2019-07-12 NOTE — PROGRESS NOTES
Chief Complaint   Patient presents with   • Abdominal Pain   • Constipation   • Diarrhea       Subjective    Toshia Barraza is a 24 y.o. female. she is here today for follow-up.    History of Present Illness  24-year-old female presents to discuss ultrasound results abdominal pain and reflux at last visit we gave her samples of Dexilant and states that greatly improved her symptoms.  Still has intermittent nausea right upper quadrant pain early satiety her weight is up 2 pounds from last office visit.  Ultrasound gallbladder was normal.  Plan; schedule patient for HIDA scan due to persistent nausea right upper quadrant tenderness on exam.  Continue Dexilant for reflux follow-up in 4 weeks for recheck return office sooner if needed       The following portions of the patient's history were reviewed and updated as appropriate:   Past Medical History:   Diagnosis Date   • Acute bacterial sinusitis    • Distorted body image    • Early onset of delivery before 37 weeks    • Encounter for  visit     Post  care status   • Encounter for  visit      visit status   • Esophagitis 2019   • Malaise and fatigue    • Primigravida      Past Surgical History:   Procedure Laterality Date   • BREAST AUGMENTATION     • BREAST BIOPSY     • COLONOSCOPY N/A 4/10/2019    Procedure: COLONOSCOPY;  Surgeon: Aidan Alvarado MD;  Location: [facility] ENDOSCOPY;  Service: Gastroenterology   • ENDOSCOPY N/A 4/10/2019    Procedure: ESOPHAGOGASTRODUODENOSCOPY Wed/Fri;  Surgeon: Aidan Alvarado MD;  Location: [facility] ENDOSCOPY;  Service: Gastroenterology     Family History   Problem Relation Age of Onset   • Other Mother         hematologic disorder   • Heart disease Mother         ischemic heart disease   • Heart attack Mother    • Leukemia Mother    • Ovarian cancer Mother         questionable   • Heart disease Father    • Hypertension Father      OB History      Para Term  AB Living    2 2 1 1    2    SAB TAB Ectopic Molar Multiple Live Births                       Obstetric Comments    The baby had gastroschisis        Prior to Admission medications    Medication Sig Start Date End Date Taking? Authorizing Provider   butalbital-acetaminophen  MG tablet tablet Take 1 tablet by mouth Every 6 (Six) Hours As Needed (headache). 4/4/19  Yes Ciera Chinchilla APRN   cyanocobalamin 1000 MCG/ML injection Inject 1 mL into the appropriate muscle as directed by prescriber Every 28 (Twenty-Eight) Days. 3/15/19  Yes Ciera Chinchilla APRN   cyclobenzaprine (FLEXERIL) 10 MG tablet Take 1 tablet by mouth 3 (Three) Times a Day As Needed for Muscle Spasms. 3/18/19  Yes Anastacio Bell MD   docusate sodium (COLACE) 100 MG capsule Take 1 capsule by mouth 2 (Two) Times a Day. 4/4/19  Yes Ciera Chinchilla APRN   escitalopram (LEXAPRO) 10 MG tablet Take 1 tablet by mouth Daily. 6/6/19  Yes Ciera Chinchilla APRN   fluticasone (FLONASE) 50 MCG/ACT nasal spray 2 sprays into the nostril(s) as directed by provider Daily. Administer 2 sprays in each nostril for each dose. 4/4/19  Yes Ciera Chinchilla APRN   hydrOXYzine (ATARAX) 10 MG tablet Take 1 tablet by mouth 3 (Three) Times a Day As Needed for Anxiety. 6/6/19  Yes Ciera Chinchilla APRN   meclizine (ANTIVERT) 12.5 MG tablet Take 1 tablet by mouth 3 (Three) Times a Day As Needed for dizziness. 3/5/19  Yes Ciera Chinchilla APRN   metoprolol tartrate (LOPRESSOR) 25 MG tablet take 1 tablet by mouth twice a day   TAKE 1 TABLET THE NIGHT BEFO...  (REFER TO PRESCRIPTION NOTES). 5/28/19  Yes ProviderJane MD   Misc. Devices (CERVICAL TRACTION) kit 1 Device Daily. 3/18/19  Yes Anastacio Bell MD   ondansetron ODT (ZOFRAN ODT) 4 MG disintegrating tablet Take 1 tablet by mouth Every 8 (Eight) Hours As Needed for Nausea or Vomiting. 3/5/19  Yes Ciera Chinchilla APRN   polyethylene glycol (MIRALAX) packet Take 17 g by mouth Daily As  "Needed (constipation). 4/4/19  Yes Ciera Chinchilla APRN   sucralfate (CARAFATE) 1 GM/10ML suspension Take 10 mL by mouth 4 (Four) Times a Day With Meals & at Bedtime. 4/17/19  Yes Aaliyah Arrieta APRN   Syringe/Needle, Disp, 25G X 5/8\" 3 ML misc 1 syringe Every 28 (Twenty-Eight) Days. 3/15/19  Yes Ciera Chinchilla APRN   topiramate (TOPAMAX) 50 MG tablet Wk1:1tabinpm;wk2:1tabbid;wk3 1tabam,2tabpm;wk4:2tabbid 3/5/19  Yes Ciera Chinchilla APRN   pantoprazole (PROTONIX) 40 MG EC tablet Take 1 tablet by mouth 2 (Two) Times a Day. 6/6/19 7/12/19 Yes Ciera Chinchilla APRN   dexlansoprazole (DEXILANT) 60 MG capsule Take 1 capsule by mouth Daily for 180 days. 7/12/19 1/8/20  Aaliyah Arrieta APRN     No Known Allergies  Social History     Socioeconomic History   • Marital status: Single     Spouse name: Not on file   • Number of children: Not on file   • Years of education: Not on file   • Highest education level: Not on file   Tobacco Use   • Smoking status: Never Smoker   • Smokeless tobacco: Never Used   Substance and Sexual Activity   • Alcohol use: Yes     Comment: occasionally   • Drug use: No   • Sexual activity: Defer       Review of Systems  Review of Systems   Constitutional: Positive for appetite change and fatigue. Negative for activity change, chills, diaphoresis, fever and unexpected weight change.   HENT: Negative for sore throat and trouble swallowing.    Respiratory: Negative for shortness of breath.    Gastrointestinal: Negative for abdominal distention, abdominal pain, anal bleeding, blood in stool, constipation, diarrhea, nausea, rectal pain and vomiting.   Musculoskeletal: Negative for arthralgias.   Skin: Negative for pallor.   Neurological: Negative for light-headedness.        /68 (BP Location: Left arm)   Pulse 84   Ht 162.6 cm (64\")   Wt 53.7 kg (118 lb 6.4 oz)   BMI 20.32 kg/m²     Objective    Physical Exam   Constitutional: She is oriented to person, place, and time. She " appears well-developed and well-nourished. She is cooperative. No distress.   HENT:   Head: Normocephalic and atraumatic.   Neck: Normal range of motion. Neck supple. No thyromegaly present.   Cardiovascular: Normal rate, regular rhythm and normal heart sounds.   Pulmonary/Chest: Effort normal and breath sounds normal. She has no wheezes. She has no rhonchi. She has no rales.   Abdominal: Soft. Normal appearance and bowel sounds are normal. She exhibits no distension. There is no hepatosplenomegaly. There is tenderness in the right upper quadrant. There is no rigidity and no guarding. No hernia.   Lymphadenopathy:     She has no cervical adenopathy.   Neurological: She is alert and oriented to person, place, and time.   Skin: Skin is warm, dry and intact. No rash noted. No pallor.   Psychiatric: She has a normal mood and affect. Her speech is normal.     Admission on 04/10/2019, Discharged on 04/10/2019   Component Date Value Ref Range Status   • HCG, Urine QL 04/10/2019 Negative  Negative Final   • Case Report 04/10/2019    Final                    Value:Surgical Pathology Report                         Case: [id number]                                  Authorizing Provider:  Aidan Alvarado MD        Collected:           04/10/2019 12:36 PM          Ordering Location:     [facility]             Received:            04/10/2019 01:49 PM                                 [facility]                                                     Pathologist:           Jeffrey Goldberg MD                                                         Specimens:   1) - Small Intestine, Duodenum                                                                      2) - Gastric, Antrum                                                                                3) - Esophagus, Distal                                                                              4) - Small Intestine, Ileum                                                                          5) - Large Intestine, colonic mucousa                                                     • Final Diagnosis 04/10/2019    Final                    Value:This result contains rich text formatting which cannot be displayed here.   • Comment 04/10/2019    Final                    Value:This result contains rich text formatting which cannot be displayed here.   • Gross Description 04/10/2019    Final                    Value:This result contains rich text formatting which cannot be displayed here.     Assessment/Plan      1. Nausea    2. Upper abdominal pain    3. Gastroesophageal reflux disease with esophagitis    .       Orders placed during this encounter include:  Orders Placed This Encounter   Procedures   • NM HIDA Scan With Pharmacological Intervention     Standing Status:   Future     Standing Expiration Date:   7/12/2020     Order Specific Question:   Patient Pregnant     Answer:   No       * Surgery not found *    Review and/or summary of lab tests, radiology, procedures, medications. Review and summary of old records and obtaining of history. The risks and benefits of my recommendations, as well as other treatment options were discussed with the patient today. Questions were answered.    New Medications Ordered This Visit   Medications   • dexlansoprazole (DEXILANT) 60 MG capsule     Sig: Take 1 capsule by mouth Daily for 180 days.     Dispense:  30 capsule     Refill:  5       Follow-up: Return in about 4 weeks (around 8/9/2019).          This document has been electronically signed by LATRICE Pearce on July 12, 2019 12:49 PM             Results for orders placed or performed during the hospital encounter of 04/10/19   Tissue Pathology Exam   Result Value Ref Range    Case Report       Surgical Pathology Report                         Case: [id number]                                  Authorizing Provider:  Aidan Alvarado MD        Collected:           04/10/2019 12:36  PM          Ordering Location:     [facility]             Received:            04/10/2019 01:49 PM                                 [place] [facility]                                                     Pathologist:           Jeffrey Goldberg MD                                                         Specimens:   1) - Small Intestine, Duodenum                                                                      2) - Gastric, Antrum                                                                                3) - Esophagus, Distal                                                                              4) - Small Intestine, Ileum                                                                         5) - Large Intestine, colonic mucousa                                                      Final Diagnosis       1.  MUCOSA, DUODENUM:   NO SIGNIFICANT HISTOLOGIC ABNORMALITY.     2.  MUCOSA, ANTRUM OF STOMACH:   CHRONIC GASTRITIS.   NEGATIVE FOR HELICOBACTER PYLORI (HP IMMUNOSTAIN).     3.  MUCOSA, DISTAL ESOPHAGUS:   MILD CHRONIC ESOPHAGITIS INVOLVING SQUAMOUS MUCOSA.    4.  MUCOSA, ILEUM:   NO SIGNIFICANT HISTOLOGIC ABNORMALITY.     5.  MUCOSA, COLON:   NO SIGNIFICANT HISTOLOGIC ABNORMALITY.       Comment       Helicobacter pylori (HP) immunostain is performed (block 2) because an appropriate inflammatory milieu is present and organisms are not seen on H & E stained slides.     HP immunostain was developed and its performance characteristics determined by [facility].  It has not been cleared or approved by the U.S.Food and Drug Administration.  The FDA has determined that such clearance of approval is not necessary.  This test is used for clinical purposes.  It should not be regarded as investigational or for research.  This laboratory is certified under the Clinical Laboratory Amendments of 1988 (CLIA-88) as qualified to perform high complexity clinical laboratory  testing.     All controls show appropriate reactivity.        Gross Description       Received for examination are 5 containers, each of which have nodular bits of white soft tissue measuring 0.3-0.5 cc in aggregate.  All specimens are embedded as labeled:  1A duodenum; 2A antrum of stomach; 3A distal esophagus; 4A ileum; 5A mucosa of colon.      Pregnancy, Urine - Urine, Clean Catch   Result Value Ref Range    HCG, Urine QL Negative Negative   Results for orders placed or performed in visit on 03/21/19   Pregnancy, Urine - Urine, Clean Catch   Result Value Ref Range    HCG, Urine QL Negative Negative   Results for orders placed or performed in visit on 03/11/19   CBC Auto Differential   Result Value Ref Range    WBC 7.34 3.20 - 9.80 10*3/mm3    RBC 4.38 3.77 - 5.16 10*6/mm3    Hemoglobin 13.0 12.0 - 15.5 g/dL    Hematocrit 38.7 35.0 - 45.0 %    MCV 88.4 80.0 - 98.0 fL    MCH 29.7 26.5 - 34.0 pg    MCHC 33.6 31.4 - 36.0 g/dL    RDW 12.2 11.5 - 14.5 %    RDW-SD 38.7 36.4 - 46.3 fl    MPV 11.3 8.0 - 12.0 fL    Platelets 200 150 - 450 10*3/mm3    Neutrophil % 66.9 37.0 - 80.0 %    Lymphocyte % 23.4 10.0 - 50.0 %    Monocyte % 7.1 0.0 - 12.0 %    Eosinophil % 2.5 0.0 - 7.0 %    Basophil % 0.1 0.0 - 2.0 %    Neutrophils, Absolute 4.91 2.00 - 8.60 10*3/mm3    Lymphocytes, Absolute 1.72 0.60 - 4.20 10*3/mm3    Monocytes, Absolute 0.52 0.00 - 0.90 10*3/mm3    Eosinophils, Absolute 0.18 0.00 - 0.70 10*3/mm3    Basophils, Absolute 0.01 0.00 - 0.20 10*3/mm3   Vitamin B12   Result Value Ref Range    Vitamin B-12 419 239 - 931 pg/mL   Lipid Panel   Result Value Ref Range    Total Cholesterol 133 (L) 150 - 200 mg/dL    Triglycerides 64 <=150 mg/dL    HDL Cholesterol 76 (H) 40 - 59 mg/dL    LDL Cholesterol  44 <=100 mg/dL    VLDL Cholesterol 12.8 mg/dL    LDL/HDL Ratio 0.58 0.00 - 3.22   Comprehensive Metabolic Panel   Result Value Ref Range    Glucose 91 74 - 99 mg/dL    BUN 13 7 - 17 mg/dL    Creatinine 0.64 0.52 - 1.04 mg/dL     Sodium 138 137 - 145 mmol/L    Potassium 3.8 3.4 - 5.0 mmol/L    Chloride 105 98 - 107 mmol/L    CO2 20.0 (L) 22.0 - 30.0 mmol/L    Calcium 8.5 8.4 - 10.2 mg/dL    Total Protein 7.4 6.3 - 8.2 g/dL    Albumin 4.70 3.50 - 5.00 g/dL    ALT (SGPT) 17 <=35 U/L    AST (SGOT) 16 14 - 36 U/L    Alkaline Phosphatase 58 38 - 126 U/L    Total Bilirubin 0.5 0.2 - 1.3 mg/dL    eGFR Non  Amer 116 71 - 165 mL/min/1.73    Globulin 2.7 2.3 - 3.5 gm/dL    A/G Ratio 1.7 1.1 - 1.8 g/dL    BUN/Creatinine Ratio 20.3 7.0 - 25.0    Anion Gap 13.0 5.0 - 15.0 mmol/L   Results for orders placed or performed in visit on 03/07/19   Thyroid Antibodies   Result Value Ref Range    Thyroid Peroxidase Antibody 9 0 - 34 IU/mL    Thyroglobulin Ab <1.0 0.0 - 0.9 IU/mL   T3   Result Value Ref Range    T3, Total 126.0 97.0 - 169.0 ng/dl   T3, Uptake   Result Value Ref Range    T3 Uptake 27 24 - 39 %   Results for orders placed or performed in visit on 03/05/19   Adult Transthoracic Echo Complete W/ Cont if Necessary Per Protocol   Result Value Ref Range    BSA 1.6 m^2    IVSd 0.83 cm    IVSs 0.74 cm    LVIDd 3.5 cm    LVIDs 2.3 cm    LVPWd 0.74 cm    BH CV ECHO ANGELIQUE - LVPWS 0.98 cm    IVS/LVPW 1.1     FS 34.6 %    EDV(Teich) 49.8 ml    ESV(Teich) 17.5 ml    EF(Teich) 64.8 %    EDV(cubed) 41.8 ml    ESV(cubed) 11.7 ml    EF(cubed) 72.0 %    % IVS thick -11.2 %    % LVPW thick 33.4 %    LV mass(C)d 72.2 grams    LV mass(C)dI 46.2 grams/m^2    LV mass(C)s 43.7 grams    LV mass(C)sI 28.0 grams/m^2    SV(Teich) 32.3 ml    SI(Teich) 20.7 ml/m^2    SV(cubed) 30.1 ml    SI(cubed) 19.2 ml/m^2    Ao root diam 2.7 cm    Ao root area 5.5 cm^2    ACS 1.8 cm    LA dimension 3.0 cm    LA/Ao 1.1     LVOT diam 1.9 cm    LVOT area 2.8 cm^2    LVOT area(traced) 2.8 cm^2    Ao root area (BSA corrected) 1.7     MV E max nahid 87.3 cm/sec    MV A max nahid 50.9 cm/sec    MV E/A 1.7     MV dec slope 409.0 cm/sec^2    Ao pk nahid 100.0 cm/sec    Ao max PG 4.0 mmHg    Ao max PG  (full) 0 mmHg    Ao V2 mean 67.7 cm/sec    Ao mean PG 2.0 mmHg    Ao mean PG (full) 0 mmHg    Ao V2 VTI 20.1 cm    WALLACE(I,A) 3.0 cm^2    WALLACE(I,D) 3.0 cm^2    WALLACE(V,A) 2.8 cm^2    WALLACE(V,D) 2.8 cm^2    LV V1 max PG 4.0 mmHg    LV V1 mean PG 2.0 mmHg    LV V1 max 100.0 cm/sec    LV V1 mean 70.1 cm/sec    LV V1 VTI 21.5 cm    MR max nahid 259.0 cm/sec    MR max PG 26.8 mmHg    SV(Ao) 110.9 ml    SI(Ao) 70.9 ml/m^2    SV(LVOT) 61.0 ml    SI(LVOT) 39.0 ml/m^2    PA V2 max 75.4 cm/sec    PA max PG 2.3 mmHg    PA V2 mean 42.4 cm/sec    PA mean PG 1.0 mmHg    PA V2 VTI 12.5 cm    TR max nahid 179.5 cm/sec    RVSP(TR) 23.4 mmHg    RAP systole 10.0 mmHg     CV ECHO ANGELIQUE - BZI_BMI 20.3 kilograms/m^2     CV ECHO ANGELIQUE - BSA(HAYCOCK) 1.6 m^2     CV ECHO ANGELIQUE - BZI_METRIC_WEIGHT 53.5 kg     CV ECHO ANGELIQUE - BZI_METRIC_HEIGHT 162.6 cm    Target HR (85%) 168 bpm    Max. Pred. HR (100%) 198 bpm    Echo EF Estimated 60 %     *Note: Due to a large number of results and/or encounters for the requested time period, some results have not been displayed. A complete set of results can be found in Results Review.

## 2019-07-12 NOTE — PATIENT INSTRUCTIONS

## 2019-07-23 ENCOUNTER — HOSPITAL ENCOUNTER (OUTPATIENT)
Dept: NUCLEAR MEDICINE | Facility: HOSPITAL | Age: 24
Discharge: HOME | End: 2019-07-23

## 2019-07-23 DIAGNOSIS — R10.10 UPPER ABDOMINAL PAIN: ICD-10-CM

## 2019-07-23 DIAGNOSIS — R11.0 NAUSEA: ICD-10-CM

## 2019-07-23 PROCEDURE — A9537 TC99M MEBROFENIN: HCPCS | Performed by: NURSE PRACTITIONER

## 2019-07-23 PROCEDURE — 0 TECHNETIUM TC 99M MEBROFENIN KIT: Performed by: NURSE PRACTITIONER

## 2019-07-23 PROCEDURE — 78226 HEPATOBILIARY SYSTEM IMAGING: CPT

## 2019-07-23 RX ORDER — KIT FOR THE PREPARATION OF TECHNETIUM TC 99M MEBROFENIN 45 MG/10ML
1 INJECTION, POWDER, LYOPHILIZED, FOR SOLUTION INTRAVENOUS
Status: COMPLETED | OUTPATIENT
Start: 2019-07-23 | End: 2019-07-23

## 2019-07-23 RX ADMIN — MEBROFENIN 1 DOSE: 45 INJECTION, POWDER, LYOPHILIZED, FOR SOLUTION INTRAVENOUS at 07:32

## 2019-07-26 ENCOUNTER — OFFICE VISIT (OUTPATIENT)
Dept: FAMILY MEDICINE CLINIC | Facility: CLINIC | Age: 24
End: 2019-07-26

## 2019-07-26 VITALS
BODY MASS INDEX: 20.32 KG/M2 | TEMPERATURE: 97.9 F | DIASTOLIC BLOOD PRESSURE: 80 MMHG | OXYGEN SATURATION: 98 % | HEIGHT: 64 IN | RESPIRATION RATE: 14 BRPM | SYSTOLIC BLOOD PRESSURE: 130 MMHG | HEART RATE: 87 BPM | WEIGHT: 119 LBS

## 2019-07-26 DIAGNOSIS — F41.9 ANXIETY: ICD-10-CM

## 2019-07-26 DIAGNOSIS — M54.2 NECK PAIN: ICD-10-CM

## 2019-07-26 DIAGNOSIS — R00.2 PALPITATIONS: ICD-10-CM

## 2019-07-26 DIAGNOSIS — R59.0 SUBMANDIBULAR LYMPHADENOPATHY: ICD-10-CM

## 2019-07-26 DIAGNOSIS — L98.9 SKIN ABNORMALITY: ICD-10-CM

## 2019-07-26 DIAGNOSIS — R00.0 RACING HEART BEAT: Primary | ICD-10-CM

## 2019-07-26 DIAGNOSIS — R68.84 JAW PAIN: ICD-10-CM

## 2019-07-26 DIAGNOSIS — R59.0 PERIAURICULAR LYMPHADENOPATHY: ICD-10-CM

## 2019-07-26 DIAGNOSIS — R51.9 ACUTE NONINTRACTABLE HEADACHE, UNSPECIFIED HEADACHE TYPE: ICD-10-CM

## 2019-07-26 DIAGNOSIS — F32.A DEPRESSION, UNSPECIFIED DEPRESSION TYPE: ICD-10-CM

## 2019-07-26 PROCEDURE — 99214 OFFICE O/P EST MOD 30 MIN: CPT | Performed by: NURSE PRACTITIONER

## 2019-07-26 RX ORDER — BUTALBITAL/ACETAMINOPHEN 50MG-325MG
1 TABLET ORAL EVERY 6 HOURS PRN
Qty: 60 EACH | Refills: 0 | Status: SHIPPED | OUTPATIENT
Start: 2019-07-26 | End: 2021-11-08

## 2019-07-29 ENCOUNTER — PRIOR AUTHORIZATION (OUTPATIENT)
Dept: FAMILY MEDICINE CLINIC | Facility: CLINIC | Age: 24
End: 2019-07-29

## 2019-07-29 NOTE — PROGRESS NOTES
Pt seeing PCP and GI for follow up.    The rendering provider is unable to close this note.  I,LATRICE Campuzano, have reviewed this note to ensure no immediate additional follow-up is warranted, and am closing the note for administrative purposes.

## 2019-07-29 NOTE — TELEPHONE ENCOUNTER
PA for butalbital-acetaminophen  MG tablet tablet has been sent  PA was approved through [employer]

## 2019-08-07 PROBLEM — R00.2 PALPITATIONS: Status: ACTIVE | Noted: 2019-08-07

## 2019-08-07 PROBLEM — R68.84 JAW PAIN: Status: ACTIVE | Noted: 2019-08-07

## 2019-08-07 PROBLEM — L98.9 SKIN ABNORMALITY: Status: ACTIVE | Noted: 2019-08-07

## 2019-08-07 PROBLEM — F32.A DEPRESSION: Status: ACTIVE | Noted: 2019-08-07
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
## 2019-08-20 ENCOUNTER — TELEPHONE (OUTPATIENT)
Dept: FAMILY MEDICINE CLINIC | Facility: CLINIC | Age: 24
End: 2019-08-20

## 2019-09-04 ENCOUNTER — LAB (OUTPATIENT)
Dept: LAB | Facility: HOSPITAL | Age: 24
End: 2019-09-04

## 2019-09-04 ENCOUNTER — OFFICE VISIT (OUTPATIENT)
Dept: OBSTETRICS AND GYNECOLOGY | Facility: CLINIC | Age: 24
End: 2019-09-04

## 2019-09-04 VITALS
SYSTOLIC BLOOD PRESSURE: 102 MMHG | HEIGHT: 64 IN | DIASTOLIC BLOOD PRESSURE: 64 MMHG | BODY MASS INDEX: 20.55 KG/M2 | WEIGHT: 120.4 LBS

## 2019-09-04 DIAGNOSIS — R30.0 DYSURIA: ICD-10-CM

## 2019-09-04 DIAGNOSIS — R30.0 DYSURIA: Primary | ICD-10-CM

## 2019-09-04 DIAGNOSIS — N89.8 VAGINAL DISCHARGE: ICD-10-CM

## 2019-09-04 LAB
CANDIDA ALBICANS: NEGATIVE
GARDNERELLA VAGINALIS: POSITIVE
T VAGINALIS DNA VAG QL PROBE+SIG AMP: NEGATIVE

## 2019-09-04 PROCEDURE — 87491 CHLMYD TRACH DNA AMP PROBE: CPT | Performed by: FAMILY MEDICINE

## 2019-09-04 PROCEDURE — 87086 URINE CULTURE/COLONY COUNT: CPT

## 2019-09-04 PROCEDURE — 81001 URINALYSIS AUTO W/SCOPE: CPT

## 2019-09-04 PROCEDURE — 87480 CANDIDA DNA DIR PROBE: CPT | Performed by: FAMILY MEDICINE

## 2019-09-04 PROCEDURE — 87661 TRICHOMONAS VAGINALIS AMPLIF: CPT | Performed by: FAMILY MEDICINE

## 2019-09-04 PROCEDURE — 99213 OFFICE O/P EST LOW 20 MIN: CPT | Performed by: FAMILY MEDICINE

## 2019-09-04 PROCEDURE — 87510 GARDNER VAG DNA DIR PROBE: CPT | Performed by: FAMILY MEDICINE

## 2019-09-04 PROCEDURE — 87186 SC STD MICRODIL/AGAR DIL: CPT

## 2019-09-04 PROCEDURE — 87591 N.GONORRHOEAE DNA AMP PROB: CPT | Performed by: FAMILY MEDICINE

## 2019-09-04 PROCEDURE — 87660 TRICHOMONAS VAGIN DIR PROBE: CPT | Performed by: FAMILY MEDICINE
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
## 2019-09-05 ENCOUNTER — TELEPHONE (OUTPATIENT)
Dept: OBSTETRICS AND GYNECOLOGY | Facility: CLINIC | Age: 24
End: 2019-09-05

## 2019-09-05 DIAGNOSIS — L98.9 SKIN ABNORMALITY: Primary | ICD-10-CM

## 2019-09-05 DIAGNOSIS — B96.89 BV (BACTERIAL VAGINOSIS): Primary | ICD-10-CM

## 2019-09-05 DIAGNOSIS — N76.0 BV (BACTERIAL VAGINOSIS): Primary | ICD-10-CM

## 2019-09-05 LAB
BACTERIA UR QL AUTO: ABNORMAL /HPF
BILIRUB UR QL STRIP: NEGATIVE
C TRACH RRNA CVX QL NAA+PROBE: NEGATIVE
CLARITY UR: ABNORMAL
COLOR UR: YELLOW
GLUCOSE UR STRIP-MCNC: NEGATIVE MG/DL
HGB UR QL STRIP.AUTO: ABNORMAL
HYALINE CASTS UR QL AUTO: ABNORMAL /LPF
KETONES UR QL STRIP: NEGATIVE
LEUKOCYTE ESTERASE UR QL STRIP.AUTO: ABNORMAL
N GONORRHOEA RRNA SPEC QL NAA+PROBE: NEGATIVE
NITRITE UR QL STRIP: NEGATIVE
PH UR STRIP.AUTO: 6 [PH] (ref 5–8)
PROT UR QL STRIP: ABNORMAL
RBC # UR: ABNORMAL /HPF
REF LAB TEST METHOD: ABNORMAL
SP GR UR STRIP: 1.01 (ref 1–1.03)
SQUAMOUS #/AREA URNS HPF: ABNORMAL /HPF
TRICHOMONAS VAGINALIS PCR: NEGATIVE
UROBILINOGEN UR QL STRIP: ABNORMAL
WBC UR QL AUTO: ABNORMAL /HPF

## 2019-09-05 RX ORDER — METRONIDAZOLE 500 MG/1
2000 TABLET ORAL ONCE
Qty: 4 TABLET | Refills: 0 | Status: SHIPPED | OUTPATIENT
Start: 2019-09-05 | End: 2019-09-05

## 2019-09-05 NOTE — TELEPHONE ENCOUNTER
THE PT CALLED ASKING FOR RESULTS AND I TOLD HER THAT SHE IS +BV AND THAT DR. LEE HAS SENT IN RX TO HER PHARMACY, AND ALSO THAT WE ARE AWAITING URINE CX RESULTS.  THE PT ASKED IF THERE WAS ANYTHING OTC THAT SHE CAN TAKE FOR URINARY PAIN.  I ADVISED HER TO TAKE AZO OTC.  THE PT SAID THAT SHE WILL GET SOME.

## 2019-09-05 NOTE — TELEPHONE ENCOUNTER
----- Message from Holly Alex MD sent at 9/5/2019  8:36 AM CDT -----  Regarding: Lab results  Please let patient know her vaginal swab was positive for BV - Flagyl was sent to her pharmacy and we are still waiting on the results of her urine.    Abi Ochoa

## 2019-09-07 DIAGNOSIS — B96.20 E. COLI UTI: Primary | ICD-10-CM

## 2019-09-07 DIAGNOSIS — N39.0 E. COLI UTI: Primary | ICD-10-CM

## 2019-09-07 LAB — BACTERIA SPEC AEROBE CULT: ABNORMAL

## 2019-09-07 RX ORDER — NITROFURANTOIN 25; 75 MG/1; MG/1
100 CAPSULE ORAL 2 TIMES DAILY
Qty: 10 CAPSULE | Refills: 0 | Status: SHIPPED | OUTPATIENT
Start: 2019-09-07 | End: 2019-09-12

## 2019-09-27 ENCOUNTER — TELEPHONE (OUTPATIENT)
Dept: FAMILY MEDICINE CLINIC | Facility: CLINIC | Age: 24
End: 2019-09-27

## 2019-09-27 NOTE — TELEPHONE ENCOUNTER
Pt calling about Dr. Souza he scared her and her family and made a sooner appt to see pt and then told her after testing there was not anything  He could do for her follow up with PCP and she is preeti freaking out only spent like 30 seconds with her. She is aking if you could please call her back.

## 2019-10-01 ENCOUNTER — OFFICE VISIT (OUTPATIENT)
Dept: FAMILY MEDICINE CLINIC | Facility: CLINIC | Age: 24
End: 2019-10-01

## 2019-10-01 VITALS
WEIGHT: 120 LBS | HEART RATE: 85 BPM | BODY MASS INDEX: 20.49 KG/M2 | SYSTOLIC BLOOD PRESSURE: 106 MMHG | HEIGHT: 64 IN | RESPIRATION RATE: 16 BRPM | DIASTOLIC BLOOD PRESSURE: 70 MMHG | OXYGEN SATURATION: 98 % | TEMPERATURE: 98.7 F

## 2019-10-01 DIAGNOSIS — R00.0 RACING HEART BEAT: ICD-10-CM

## 2019-10-01 DIAGNOSIS — F41.9 ANXIETY: ICD-10-CM

## 2019-10-01 DIAGNOSIS — R42 DIZZINESS: ICD-10-CM

## 2019-10-01 DIAGNOSIS — R22.1 NECK MASS: ICD-10-CM

## 2019-10-01 DIAGNOSIS — R19.8 ABDOMINAL FULLNESS: ICD-10-CM

## 2019-10-01 DIAGNOSIS — L98.9 SKIN ABNORMALITY: ICD-10-CM

## 2019-10-01 DIAGNOSIS — R59.0 SUBMANDIBULAR LYMPHADENOPATHY: Primary | ICD-10-CM

## 2019-10-01 DIAGNOSIS — R68.81 EARLY SATIETY: ICD-10-CM

## 2019-10-01 DIAGNOSIS — R68.84 JAW PAIN: ICD-10-CM

## 2019-10-01 DIAGNOSIS — K29.00 ACUTE SUPERFICIAL GASTRITIS WITHOUT HEMORRHAGE: ICD-10-CM

## 2019-10-01 DIAGNOSIS — R51.9 ACUTE NONINTRACTABLE HEADACHE, UNSPECIFIED HEADACHE TYPE: ICD-10-CM

## 2019-10-01 DIAGNOSIS — R00.2 PALPITATIONS: ICD-10-CM

## 2019-10-01 DIAGNOSIS — Z82.49 FAMILY HISTORY OF HEART DISEASE: ICD-10-CM

## 2019-10-01 DIAGNOSIS — M54.2 NECK PAIN: ICD-10-CM

## 2019-10-01 DIAGNOSIS — K20.90 ESOPHAGITIS: ICD-10-CM

## 2019-10-01 PROCEDURE — 99214 OFFICE O/P EST MOD 30 MIN: CPT | Performed by: NURSE PRACTITIONER
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
## 2019-10-21 ENCOUNTER — TELEPHONE (OUTPATIENT)
Dept: FAMILY MEDICINE CLINIC | Facility: CLINIC | Age: 24
End: 2019-10-21

## 2019-10-21 NOTE — TELEPHONE ENCOUNTER
She came to window inquiring about an u/s that you told her to get. I don't see an order in for that but your last office note isn't done yet either so please advise.

## 2019-10-29 ENCOUNTER — OFFICE VISIT (OUTPATIENT)
Dept: SURGERY | Facility: CLINIC | Age: 24
End: 2019-10-29

## 2019-10-29 VITALS
TEMPERATURE: 97.8 F | SYSTOLIC BLOOD PRESSURE: 110 MMHG | BODY MASS INDEX: 21.17 KG/M2 | DIASTOLIC BLOOD PRESSURE: 70 MMHG | HEART RATE: 79 BPM | HEIGHT: 64 IN | WEIGHT: 124 LBS

## 2019-10-29 DIAGNOSIS — L98.9 DISORDER OF SKIN OR SUBCUTANEOUS TISSUE: Primary | ICD-10-CM

## 2019-10-29 PROCEDURE — 99203 OFFICE O/P NEW LOW 30 MIN: CPT | Performed by: SURGERY

## 2019-11-10 NOTE — PROGRESS NOTES
CHIEF COMPLAINT:    Abnormality on left buttock    HISTORY OF PRESENT ILLNESS:    Toshia Barraza is a 24 y.o. female who has previously undergone injections in the left buttock region of what sounds like potentially steroid.  Following these injections patient is noticed a cleft or defect in the left upper buttock.  This was imaged and showed a defect as well without underlying mass or other abnormality.  Patient is here today to discuss what can be done about this.  She notes no pain in the area.    Past Medical History:   Diagnosis Date   • Acute bacterial sinusitis    • Distorted body image    • Early onset of delivery before 37 weeks    • Encounter for  visit     Post  care status   • Encounter for  visit      visit status   • Esophagitis 2019   • Malaise and fatigue    • Primigravida        Past Surgical History:   Procedure Laterality Date   • BREAST AUGMENTATION     • BREAST BIOPSY     • COLONOSCOPY N/A 4/10/2019    Procedure: COLONOSCOPY;  Surgeon: Aidan Alvarado MD;  Location: [facility] ENDOSCOPY;  Service: Gastroenterology   • ENDOSCOPY N/A 4/10/2019    Procedure: ESOPHAGOGASTRODUODENOSCOPY Wed/Fri;  Surgeon: Aidan Alvarado MD;  Location: [facility] ENDOSCOPY;  Service: Gastroenterology       Prior to Admission medications    Medication Sig Start Date End Date Taking? Authorizing Provider   cyanocobalamin 1000 MCG/ML injection Inject 1 mL into the appropriate muscle as directed by prescriber Every 28 (Twenty-Eight) Days. 3/15/19  Yes Ciera Chinchilla APRN   dexlansoprazole (DEXILANT) 60 MG capsule Take 1 capsule by mouth Daily for 180 days. 19 Yes Aaliyah Arrieta APRN   escitalopram (LEXAPRO) 10 MG tablet Take 1 tablet by mouth Daily. 19  Yes Ciera Chinchilla APRN   butalbital-acetaminophen  MG tablet tablet Take 1 tablet by mouth Every 6 (Six) Hours As Needed (headache). 19   Ciera Chinchilla APRN   sucralfate (CARAFATE) 1 GM/10ML  suspension Take 10 mL by mouth 4 (Four) Times a Day With Meals & at Bedtime. 4/17/19   Berny Aaliyah LATRICE HEREDIA       No Known Allergies    Family History   Problem Relation Age of Onset   • Other Mother         hematologic disorder   • Heart disease Mother         ischemic heart disease   • Heart attack Mother    • Leukemia Mother    • Ovarian cancer Mother         questionable   • Heart disease Father    • Hypertension Father        Social History     Socioeconomic History   • Marital status: Single     Spouse name: Not on file   • Number of children: Not on file   • Years of education: Not on file   • Highest education level: Not on file   Tobacco Use   • Smoking status: Never Smoker   • Smokeless tobacco: Never Used   Substance and Sexual Activity   • Alcohol use: No     Frequency: Never   • Drug use: No   • Sexual activity: Defer       Review of Systems   Constitutional: Negative for appetite change, chills, fever and unexpected weight change.   HENT: Negative for hearing loss, nosebleeds and trouble swallowing.    Eyes: Negative for visual disturbance.   Respiratory: Negative for apnea, cough, choking, chest tightness, shortness of breath, wheezing and stridor.    Cardiovascular: Negative for chest pain, palpitations and leg swelling.   Gastrointestinal: Negative for abdominal distention, abdominal pain, blood in stool, constipation, diarrhea, nausea and vomiting.   Endocrine: Negative for cold intolerance, heat intolerance, polydipsia, polyphagia and polyuria.   Genitourinary: Negative for difficulty urinating, dysuria, frequency, hematuria and urgency.   Musculoskeletal: Positive for back pain. Negative for arthralgias, myalgias and neck pain.   Skin: Negative for color change, pallor and rash.   Allergic/Immunologic: Negative for immunocompromised state.   Neurological: Negative for dizziness, seizures, syncope, light-headedness, numbness and headaches.   Hematological: Negative for adenopathy.  "  Psychiatric/Behavioral: Negative for suicidal ideas. The patient is not nervous/anxious.        Objective     /70   Pulse 79   Temp 97.8 °F (36.6 °C) (Temporal)   Ht 162.6 cm (64\")   Wt 56.2 kg (124 lb)   BMI 21.28 kg/m²     Physical Exam   Constitutional: She is oriented to person, place, and time. She appears well-developed and well-nourished. No distress.   HENT:   Head: Normocephalic and atraumatic.   Eyes: Conjunctivae and EOM are normal. Pupils are equal, round, and reactive to light. Right eye exhibits no discharge. Left eye exhibits no discharge.   Neck: Normal range of motion. Neck supple. No JVD present. No tracheal deviation present. No thyromegaly present.   Cardiovascular: Normal rate, regular rhythm and normal heart sounds. Exam reveals no gallop and no friction rub.   No murmur heard.  Pulmonary/Chest: Effort normal and breath sounds normal. No respiratory distress. She has no wheezes. She has no rales. She exhibits no tenderness.   Abdominal: Soft. She exhibits no distension and no mass. There is no tenderness. There is no rebound and no guarding. No hernia.   Musculoskeletal: Normal range of motion. She exhibits no edema, tenderness or deformity.   Neurological: She is alert and oriented to person, place, and time. No cranial nerve deficit.   Skin: Skin is warm and dry. No rash noted. She is not diaphoretic. No erythema. No pallor.        Psychiatric: She has a normal mood and affect. Her behavior is normal. Judgment and thought content normal.       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  This appears to be a cosmetic issue only.  Told her that from my standpoint I could excise this area and reapproximate " the normal fatty tissue surrounding it which would leave a scar.  I also advised her that she may want to discuss this with the plastic surgeon as other possibilities may be available in that arena.  She will call me if she would like to have excision performed.          This document has been electronically signed by Ant Bradley MD on November 10, 2019 4:05 PM

## 2020-05-11 ENCOUNTER — TELEPHONE (OUTPATIENT)
Dept: FAMILY MEDICINE CLINIC | Facility: CLINIC | Age: 25
End: 2020-05-11

## 2020-05-15 ENCOUNTER — OFFICE VISIT (OUTPATIENT)
Dept: FAMILY MEDICINE CLINIC | Facility: CLINIC | Age: 25
End: 2020-05-15

## 2020-05-15 DIAGNOSIS — R20.9 BILATERAL COLD FEET: ICD-10-CM

## 2020-05-15 DIAGNOSIS — R10.13 EPIGASTRIC ABDOMINAL PAIN: ICD-10-CM

## 2020-05-15 DIAGNOSIS — R68.81 EARLY SATIETY: ICD-10-CM

## 2020-05-15 DIAGNOSIS — M54.2 PAIN, NECK: ICD-10-CM

## 2020-05-15 DIAGNOSIS — R11.0 NAUSEA: ICD-10-CM

## 2020-05-15 DIAGNOSIS — R19.4 CHANGE IN BOWEL HABITS: ICD-10-CM

## 2020-05-15 DIAGNOSIS — R12 HEARTBURN: ICD-10-CM

## 2020-05-15 DIAGNOSIS — R59.0 SUBMANDIBULAR LYMPHADENOPATHY: ICD-10-CM

## 2020-05-15 DIAGNOSIS — R22.1 NECK SWELLING: ICD-10-CM

## 2020-05-15 DIAGNOSIS — R10.33 PERIUMBILICAL PAIN: ICD-10-CM

## 2020-05-15 DIAGNOSIS — R09.89 CHOKING EPISODE: ICD-10-CM

## 2020-05-15 DIAGNOSIS — G90.A POTS (POSTURAL ORTHOSTATIC TACHYCARDIA SYNDROME): Primary | ICD-10-CM

## 2020-05-15 DIAGNOSIS — K20.90 ESOPHAGITIS: ICD-10-CM

## 2020-05-15 DIAGNOSIS — R13.12 OROPHARYNGEAL DYSPHAGIA: ICD-10-CM

## 2020-05-15 DIAGNOSIS — G43.809 OTHER MIGRAINE WITHOUT STATUS MIGRAINOSUS, NOT INTRACTABLE: ICD-10-CM

## 2020-05-15 DIAGNOSIS — R42 DIZZINESS: ICD-10-CM

## 2020-05-15 DIAGNOSIS — R59.0 PERIAURICULAR LYMPHADENOPATHY: ICD-10-CM

## 2020-05-15 DIAGNOSIS — K29.00 ACUTE SUPERFICIAL GASTRITIS WITHOUT HEMORRHAGE: ICD-10-CM

## 2020-05-15 PROCEDURE — 99214 OFFICE O/P EST MOD 30 MIN: CPT | Performed by: NURSE PRACTITIONER

## 2020-05-15 RX ORDER — LIDOCAINE HYDROCHLORIDE 20 MG/ML
SOLUTION OROPHARYNGEAL
Qty: 100 ML | Refills: 5 | Status: SHIPPED | OUTPATIENT
Start: 2020-05-15 | End: 2020-07-23
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
## 2020-05-22 ENCOUNTER — DOCUMENTATION (OUTPATIENT)
Dept: FAMILY MEDICINE CLINIC | Facility: CLINIC | Age: 25
End: 2020-05-22

## 2020-05-22 DIAGNOSIS — R22.1 NECK SWELLING: ICD-10-CM

## 2020-05-22 DIAGNOSIS — G43.809 OTHER MIGRAINE WITHOUT STATUS MIGRAINOSUS, NOT INTRACTABLE: ICD-10-CM

## 2020-05-22 DIAGNOSIS — M54.2 NECK PAIN: ICD-10-CM

## 2020-05-22 DIAGNOSIS — R13.12 OROPHARYNGEAL DYSPHAGIA: ICD-10-CM

## 2020-05-22 DIAGNOSIS — R09.89 CHOKING EPISODE: ICD-10-CM

## 2020-05-22 DIAGNOSIS — R13.12 OROPHARYNGEAL DYSPHAGIA: Primary | ICD-10-CM

## 2020-05-22 DIAGNOSIS — R42 DIZZINESS: ICD-10-CM

## 2020-05-22 DIAGNOSIS — R59.0 SUBMANDIBULAR LYMPHADENOPATHY: ICD-10-CM

## 2020-05-22 DIAGNOSIS — R59.0 PERIAURICULAR LYMPHADENOPATHY: ICD-10-CM

## 2020-05-22 DIAGNOSIS — G51.8 PAIN DUE TO NEUROPATHY OF FACIAL NERVE: Primary | ICD-10-CM

## 2020-05-22 DIAGNOSIS — R59.0 PERIAURICULAR LYMPHADENOPATHY: Primary | ICD-10-CM

## 2020-05-22 RX ORDER — GABAPENTIN 100 MG/1
CAPSULE ORAL
Qty: 60 CAPSULE | Refills: 0 | Status: SHIPPED | OUTPATIENT
Start: 2020-05-22 | End: 2021-08-18

## 2020-05-22 RX ORDER — GABAPENTIN 100 MG/1
100 CAPSULE ORAL 3 TIMES DAILY
COMMUNITY
End: 2020-05-22

## 2020-05-22 NOTE — PROGRESS NOTES
Spoke with patient yesterday on 5/21/20 about referrals and appts:    Patient does not have appointment with [facility] ENT Dr. Thomas yet but Azael is working on this.    Patient has MRI of the neck with contrast scheduled at [facility] at 11:30 AM tomorrow which is 5/22/2020.    Patient has appointment with the GI specialist Berny on 5/27 Wednesday at 1045 in the [facility] on third floor.    Patient states that she is aware of these visits she will be there and has no time complex with them.  Will call her about the [facility] appointment.  Discussed with patient how she was doing on the GI cocktail she said that it does help mask the pain some and would like to continue it.  I put refills on this.  Discussed trying gabapentin and this being a controlled substance and that if she continues it we would have to do a controlled contract in the future but may help with her possible neuropathic pain that she is having in her face.    Follow-up pending MRI results.

## 2020-05-27 ENCOUNTER — OFFICE VISIT (OUTPATIENT)
Dept: GASTROENTEROLOGY | Facility: CLINIC | Age: 25
End: 2020-05-27

## 2020-05-27 VITALS
BODY MASS INDEX: 21.61 KG/M2 | WEIGHT: 126.6 LBS | HEIGHT: 64 IN | DIASTOLIC BLOOD PRESSURE: 70 MMHG | SYSTOLIC BLOOD PRESSURE: 110 MMHG | HEART RATE: 84 BPM | OXYGEN SATURATION: 98 %

## 2020-05-27 DIAGNOSIS — R13.19 ESOPHAGEAL DYSPHAGIA: ICD-10-CM

## 2020-05-27 DIAGNOSIS — K21.9 GASTROESOPHAGEAL REFLUX DISEASE, ESOPHAGITIS PRESENCE NOT SPECIFIED: ICD-10-CM

## 2020-05-27 DIAGNOSIS — R10.10 UPPER ABDOMINAL PAIN: Primary | ICD-10-CM

## 2020-05-27 DIAGNOSIS — R11.0 NAUSEA: ICD-10-CM

## 2020-05-27 PROCEDURE — 99214 OFFICE O/P EST MOD 30 MIN: CPT | Performed by: NURSE PRACTITIONER

## 2020-05-27 RX ORDER — DEXTROSE AND SODIUM CHLORIDE 5; .45 G/100ML; G/100ML
30 INJECTION, SOLUTION INTRAVENOUS CONTINUOUS PRN
Status: CANCELLED | OUTPATIENT
Start: 2020-06-02

## 2020-05-27 RX ORDER — DEXLANSOPRAZOLE 60 MG/1
60 CAPSULE, DELAYED RELEASE ORAL DAILY
Qty: 30 CAPSULE | Refills: 5 | Status: SHIPPED | OUTPATIENT
Start: 2020-05-27 | End: 2020-11-23

## 2020-05-27 NOTE — PROGRESS NOTES
"Chief Complaint   Patient presents with   • Abdominal Pain   • Constipation   • Diarrhea   • Difficulty Swallowing       Subjective    Toshia Barraza is a 25 y.o. female. she is here today for follow-up.    25-year-old female presents to discuss upper abdominal pain and dysphasia with globus sensation.  States pain occurs intermittently described as very sharp and feels like it goes all the way to her spine when it hits she feels very nauseated cannot eat she has been following up with ENT physician and reports large lymph nodes found in her submandibular area she is awaiting follow-up for further work-up of that.  She underwent EGD 4/10/2019 which noted esophagitis gastritis we treated her with Dexilant greatly improved her symptoms but she stopped taking it when she ran out of samples.  Last year (7/2019)she also had normal ultrasound and HIDA scan at 70%    Abdominal Pain   This is a recurrent problem. The current episode started 1 to 4 weeks ago. The onset quality is gradual. The problem occurs daily. The problem has been waxing and waning. The pain is located in the epigastric region. The pain is severe. The quality of the pain is sharp. The abdominal pain radiates to the back (\"feels like it goes to my spine\"). Associated symptoms include anorexia and nausea. Pertinent negatives include no arthralgias, belching, constipation, diarrhea, dysuria, fever, flatus, frequency, headaches, hematochezia, hematuria, melena, myalgias, vomiting or weight loss.   Difficulty Swallowing   This is a chronic problem. The current episode started more than 1 year ago. Associated symptoms include abdominal pain, anorexia, fatigue, nausea, swollen glands (enlarged submandibular node on right per MR neck ) and a visual change (left eye ). Pertinent negatives include no arthralgias, change in bowel habit, chest pain, chills, congestion, diaphoresis, fever, headaches, joint swelling, myalgias, neck pain, numbness, rash, sore " throat, urinary symptoms, vertigo, vomiting or weakness. The symptoms are aggravated by eating. The treatment provided moderate relief.            The following portions of the patient's history were reviewed and updated as appropriate:   Past Medical History:   Diagnosis Date   • Acute bacterial sinusitis    • Distorted body image    • Early onset of delivery before 37 weeks    • Encounter for  visit     Post  care status   • Encounter for  visit      visit status   • Esophagitis 2019   • Malaise and fatigue    • Primigravida      Past Surgical History:   Procedure Laterality Date   • BREAST AUGMENTATION     • BREAST BIOPSY     • COLONOSCOPY N/A 4/10/2019    Procedure: COLONOSCOPY;  Surgeon: Aidan Alvarado MD;  Location: [facility] ENDOSCOPY;  Service: Gastroenterology   • ENDOSCOPY N/A 4/10/2019    Procedure: ESOPHAGOGASTRODUODENOSCOPY Wed/Fri;  Surgeon: Aidan Alvarado MD;  Location: [facility] ENDOSCOPY;  Service: Gastroenterology     Family History   Problem Relation Age of Onset   • Other Mother         hematologic disorder   • Heart disease Mother         ischemic heart disease   • Heart attack Mother    • Leukemia Mother    • Ovarian cancer Mother         questionable   • Heart disease Father    • Hypertension Father      OB History        2    Para   2    Term   1       1    AB        Living   2       SAB        TAB        Ectopic        Molar        Multiple        Live Births              Obstetric Comments   The baby had gastroschisis           Prior to Admission medications    Medication Sig Start Date End Date Taking? Authorizing Provider   butalbital-acetaminophen  MG tablet tablet Take 1 tablet by mouth Every 6 (Six) Hours As Needed (headache). 19  Yes Ciera Chinchilla APRN   cyanocobalamin 1000 MCG/ML injection Inject 1 mL into the appropriate muscle as directed by prescriber Every 28 (Twenty-Eight) Days. 3/15/19  Yes Ciera Chinchilla  LATRICE   escitalopram (LEXAPRO) 10 MG tablet Take 1 tablet by mouth Daily. 6/6/19  Yes Ciera Chinchilla APRN   gabapentin (NEURONTIN) 100 MG capsule Take 1-3 capsules up to three times per day for nerve pain as needed. 5/22/20  Yes Ciera Chinchilla APRN   Lidocaine Viscous HCl (XYLOCAINE) 2 % solution Combine Alum-MG-Yesica 152-130-85zn/5ml susp 15ml & lid visc 2% solu 10 mL. Give 5 ml bid prn for GI pain x 4 wks, give [id number] 5/15/20  Yes Ciera Chinchilla APRN   ondansetron ODT (ZOFRAN-ODT) 4 MG disintegrating tablet Take 1 tablet by mouth Every 8 (Eight) Hours As Needed for Nausea or Vomiting for up to 12 doses. 11/15/19  Yes Kishore Barrera MD   sucralfate (CARAFATE) 1 GM/10ML suspension Take 10 mL by mouth 4 (Four) Times a Day With Meals & at Bedtime. 4/17/19  Yes Aaliyah Arrieta APRN     No Known Allergies  Social History     Socioeconomic History   • Marital status: Single     Spouse name: Not on file   • Number of children: Not on file   • Years of education: Not on file   • Highest education level: Not on file   Tobacco Use   • Smoking status: Never Smoker   • Smokeless tobacco: Never Used   Substance and Sexual Activity   • Alcohol use: No     Frequency: Never   • Drug use: No   • Sexual activity: Defer       Review of Systems  Review of Systems   Constitutional: Positive for fatigue. Negative for activity change, appetite change, chills, diaphoresis, fever, unexpected weight change and weight loss.   HENT: Positive for trouble swallowing. Negative for congestion and sore throat.    Respiratory: Negative for shortness of breath.    Cardiovascular: Negative for chest pain.   Gastrointestinal: Positive for abdominal pain, anorexia and nausea. Negative for abdominal distention, anal bleeding, blood in stool, change in bowel habit, constipation, diarrhea, flatus, hematochezia, melena, rectal pain and vomiting.   Genitourinary: Negative for dysuria, frequency and hematuria.   Musculoskeletal: Negative  "for arthralgias, joint swelling, myalgias and neck pain.   Skin: Negative for pallor and rash.   Neurological: Negative for vertigo, weakness, light-headedness, numbness and headaches.        /70   Pulse 84   Ht 162.6 cm (64\")   Wt 57.4 kg (126 lb 9.6 oz)   SpO2 98%   BMI 21.73 kg/m²     Objective    Physical Exam   Constitutional: She is oriented to person, place, and time. She appears well-developed and well-nourished. She is cooperative. No distress.   HENT:   Head: Normocephalic and atraumatic.   Neck: Normal range of motion. Neck supple. No thyromegaly present.   Cardiovascular: Normal rate, regular rhythm and normal heart sounds.   Pulmonary/Chest: Effort normal and breath sounds normal. She has no wheezes. She has no rhonchi. She has no rales.   Abdominal: Soft. Normal appearance and bowel sounds are normal. She exhibits no distension. There is no hepatosplenomegaly. There is tenderness in the right upper quadrant and epigastric area. There is no rigidity and no guarding. No hernia.   Lymphadenopathy:     She has no cervical adenopathy.   Neurological: She is alert and oriented to person, place, and time.   Skin: Skin is warm, dry and intact. No rash noted. No pallor.   Psychiatric: She has a normal mood and affect. Her speech is normal.     Lab on 09/04/2019   Component Date Value Ref Range Status   • Color, UA 09/04/2019 Yellow  Yellow, Straw Final   • Appearance, UA 09/04/2019 Cloudy* Clear Final   • pH, UA 09/04/2019 6.0  5.0 - 8.0 Final   • Specific Gravity, UA 09/04/2019 1.011  1.005 - 1.030 Final   • Glucose, UA 09/04/2019 Negative  Negative Final   • Ketones, UA 09/04/2019 Negative  Negative Final   • Bilirubin, UA 09/04/2019 Negative  Negative Final   • Blood, UA 09/04/2019 Moderate (2+)* Negative Final   • Protein, UA 09/04/2019 30 mg/dL (1+)* Negative Final   • Leuk Esterase, UA 09/04/2019 Large (3+)* Negative Final   • Nitrite, UA 09/04/2019 Negative  Negative Final   • Urobilinogen, UA " 09/04/2019 0.2 E.U./dL  0.2 - 1.0 E.U./dL Final   • RBC, UA 09/04/2019 3-5* None Seen, 0-2 /HPF Final   • WBC, UA 09/04/2019 Too Numerous to Count* None Seen, 0-2 /HPF Final   • Bacteria, UA 09/04/2019 Trace* None Seen /HPF Final   • Squamous Epithelial Cells, UA 09/04/2019 0-2  None Seen, 0-2 /HPF Final   • Hyaline Casts, UA 09/04/2019 0-2  None Seen /LPF Final   • Methodology 09/04/2019 Manual Light Microscopy   Final   • Urine Culture 09/04/2019 >100,000 CFU/mL Escherichia coli*  Final     Assessment/Plan      1. Upper abdominal pain    2. Nausea    3. Gastroesophageal reflux disease, esophagitis presence not specified    4. Esophageal dysphagia    .   We will repeat ultrasound gallbladder to rule out formation of gallstones or acute inflammation.  We will schedule patient for EGD due to continual reflux symptoms nausea upper abdominal pain and dysphagia.  Restart patient on to Dexilant continue Carafate as needed.    Orders placed during this encounter include:  Orders Placed This Encounter   Procedures   • US Gallbladder     Standing Status:   Future     Standing Expiration Date:   5/27/2021     Order Specific Question:   Reason for Exam:     Answer:   RUQ pain and nausea   • Follow Anesthesia Guidelines / Standing Orders     Standing Status:   Future   • Obtain Informed Consent     Standing Status:   Future     Order Specific Question:   Informed Consent Given For     Answer:   ESOPHAGOGASTRODUODENOSCOPY possible dilation       ESOPHAGOGASTRODUODENOSCOPY possible dilation (N/A)    Review and/or summary of lab tests, radiology, procedures, medications. Review and summary of old records and obtaining of history. The risks and benefits of my recommendations, as well as other treatment options were discussed with the patient today. Questions were answered.    New Medications Ordered This Visit   Medications   • dexlansoprazole (DEXILANT) 60 MG capsule     Sig: Take 1 capsule by mouth Daily for 180 days.      Dispense:  30 capsule     Refill:  5       Follow-up: Return in about 4 weeks (around 6/24/2020) for After test.          This document has been electronically signed by LATRICE Pearce on May 27, 2020 13:23             Results for orders placed or performed in visit on 09/04/19   Urinalysis, Microscopic Only - Urine, Clean Catch   Result Value Ref Range    RBC, UA 3-5 (A) None Seen, 0-2 /HPF    WBC, UA Too Numerous to Count (A) None Seen, 0-2 /HPF    Bacteria, UA Trace (A) None Seen /HPF    Squamous Epithelial Cells, UA 0-2 None Seen, 0-2 /HPF    Hyaline Casts, UA 0-2 None Seen /LPF    Methodology Manual Light Microscopy    Urinalysis With Culture If Indicated - Urine, Clean Catch   Result Value Ref Range    Color, UA Yellow Yellow, Straw    Appearance, UA Cloudy (A) Clear    pH, UA 6.0 5.0 - 8.0    Specific Gravity, UA 1.011 1.005 - 1.030    Glucose, UA Negative Negative    Ketones, UA Negative Negative    Bilirubin, UA Negative Negative    Blood, UA Moderate (2+) (A) Negative    Protein, UA 30 mg/dL (1+) (A) Negative    Leuk Esterase, UA Large (3+) (A) Negative    Nitrite, UA Negative Negative    Urobilinogen, UA 0.2 E.U./dL 0.2 - 1.0 E.U./dL   Urine Culture - Urine, Urine, Clean Catch   Result Value Ref Range    Urine Culture >100,000 CFU/mL Escherichia coli (A)        Susceptibility    Escherichia coli - REY     Ampicillin <=2 Susceptible ug/ml     Ampicillin + Sulbactam <=2 Susceptible ug/ml     Cefazolin <=4 Susceptible ug/ml     Cefepime <=1 Susceptible ug/ml     Ceftazidime <=1 Susceptible ug/ml     Ceftriaxone <=1 Susceptible ug/ml     Gentamicin <=1 Susceptible ug/ml     Levofloxacin <=0.12 Susceptible ug/ml     Nitrofurantoin <=16 Susceptible ug/ml     Piperacillin + Tazobactam <=4 Susceptible ug/ml     Tetracycline <=1 Susceptible ug/ml     Trimethoprim + Sulfamethoxazole <=20 Susceptible ug/ml   Results for orders placed or performed in visit on 09/04/19   Chlamydia trachomatis, Neisseria  gonorrhoeae, Trichomonas vaginalis, PCR - Swab, Cervix   Result Value Ref Range    Chlamydia DNA by PCR Negative Negative    Neisseria gonorrhoeae by PCR Negative Negative    Trichomonas vaginalis PCR Negative    Gardnerella vaginalis, Trichomonas vaginalis, Candida albicans, DNA - Swab, Vagina   Result Value Ref Range    CANDIDA ALBICANS Negative     GARDNERELLA VAGINALIS Positive     TRICHOMONAS VAGINALIS Negative    Results for orders placed or performed during the hospital encounter of 04/10/19   Tissue Pathology Exam   Result Value Ref Range    Case Report       Surgical Pathology Report                         Case: [id number]                                  Authorizing Provider:  Aidan Alvarado MD        Collected:           04/10/2019 12:36 PM          Ordering Location:     [facility]             Received:            04/10/2019 01:49 PM                                 [facility]                                                     Pathologist:           Jeffrey Goldberg MD                                                         Specimens:   1) - Small Intestine, Duodenum                                                                      2) - Gastric, Antrum                                                                                3) - Esophagus, Distal                                                                              4) - Small Intestine, Ileum                                                                         5) - Large Intestine, colonic mucousa                                                      Final Diagnosis       1.  MUCOSA, DUODENUM:   NO SIGNIFICANT HISTOLOGIC ABNORMALITY.     2.  MUCOSA, ANTRUM OF STOMACH:   CHRONIC GASTRITIS.   NEGATIVE FOR HELICOBACTER PYLORI (HP IMMUNOSTAIN).     3.  MUCOSA, DISTAL ESOPHAGUS:   MILD CHRONIC ESOPHAGITIS INVOLVING SQUAMOUS MUCOSA.    4.  MUCOSA, ILEUM:   NO SIGNIFICANT HISTOLOGIC ABNORMALITY.     5.  MUCOSA, COLON:    NO SIGNIFICANT HISTOLOGIC ABNORMALITY.       Comment       Helicobacter pylori (HP) immunostain is performed (block 2) because an appropriate inflammatory milieu is present and organisms are not seen on H & E stained slides.     HP immunostain was developed and [facility]  It has not been cleared or approved by the U.S.Food and Drug Administration.  The FDA has determined that such clearance of approval is not necessary.  This test is used for clinical purposes.  It should not be regarded as investigational or for research.  This laboratory is certified under the Clinical Laboratory Amendments of 1988 (CLIA-88) as qualified to perform high complexity clinical laboratory testing.     All controls show appropriate reactivity.        Gross Description       Received for examination are 5 containers, each of which have nodular bits of white soft tissue measuring 0.3-0.5 cc in aggregate.  All specimens are embedded as labeled:  1A duodenum; 2A antrum of stomach; 3A distal esophagus; 4A ileum; 5A mucosa of colon.      Pregnancy, Urine - Urine, Clean Catch   Result Value Ref Range    HCG, Urine QL Negative Negative   Results for orders placed or performed in visit on 03/21/19   Pregnancy, Urine - Urine, Clean Catch   Result Value Ref Range    HCG, Urine QL Negative Negative   Results for orders placed or performed in visit on 03/11/19   CBC Auto Differential   Result Value Ref Range    WBC 7.34 3.20 - 9.80 10*3/mm3    RBC 4.38 3.77 - 5.16 10*6/mm3    Hemoglobin 13.0 12.0 - 15.5 g/dL    Hematocrit 38.7 35.0 - 45.0 %    MCV 88.4 80.0 - 98.0 fL    MCH 29.7 26.5 - 34.0 pg    MCHC 33.6 31.4 - 36.0 g/dL    RDW 12.2 11.5 - 14.5 %    RDW-SD 38.7 36.4 - 46.3 fl    MPV 11.3 8.0 - 12.0 fL    Platelets 200 150 - 450 10*3/mm3    Neutrophil % 66.9 37.0 - 80.0 %    Lymphocyte % 23.4 10.0 - 50.0 %    Monocyte % 7.1 0.0 - 12.0 %    Eosinophil % 2.5 0.0 -  7.0 %    Basophil % 0.1 0.0 - 2.0 %    Neutrophils, Absolute 4.91 2.00 - 8.60 10*3/mm3    Lymphocytes, Absolute 1.72 0.60 - 4.20 10*3/mm3    Monocytes, Absolute 0.52 0.00 - 0.90 10*3/mm3    Eosinophils, Absolute 0.18 0.00 - 0.70 10*3/mm3    Basophils, Absolute 0.01 0.00 - 0.20 10*3/mm3   Vitamin B12   Result Value Ref Range    Vitamin B-12 419 239 - 931 pg/mL   Lipid Panel   Result Value Ref Range    Total Cholesterol 133 (L) 150 - 200 mg/dL    Triglycerides 64 <=150 mg/dL    HDL Cholesterol 76 (H) 40 - 59 mg/dL    LDL Cholesterol  44 <=100 mg/dL    VLDL Cholesterol 12.8 mg/dL    LDL/HDL Ratio 0.58 0.00 - 3.22   Comprehensive Metabolic Panel   Result Value Ref Range    Glucose 91 74 - 99 mg/dL    BUN 13 7 - 17 mg/dL    Creatinine 0.64 0.52 - 1.04 mg/dL    Sodium 138 137 - 145 mmol/L    Potassium 3.8 3.4 - 5.0 mmol/L    Chloride 105 98 - 107 mmol/L    CO2 20.0 (L) 22.0 - 30.0 mmol/L    Calcium 8.5 8.4 - 10.2 mg/dL    Total Protein 7.4 6.3 - 8.2 g/dL    Albumin 4.70 3.50 - 5.00 g/dL    ALT (SGPT) 17 <=35 U/L    AST (SGOT) 16 14 - 36 U/L    Alkaline Phosphatase 58 38 - 126 U/L    Total Bilirubin 0.5 0.2 - 1.3 mg/dL    eGFR Non  Amer 116 71 - 165 mL/min/1.73    Globulin 2.7 2.3 - 3.5 gm/dL    A/G Ratio 1.7 1.1 - 1.8 g/dL    BUN/Creatinine Ratio 20.3 7.0 - 25.0    Anion Gap 13.0 5.0 - 15.0 mmol/L   Results for orders placed or performed in visit on 03/07/19   Thyroid Antibodies   Result Value Ref Range    Thyroid Peroxidase Antibody 9 0 - 34 IU/mL    Thyroglobulin Ab <1.0 0.0 - 0.9 IU/mL   T3   Result Value Ref Range    T3, Total 126.0 97.0 - 169.0 ng/dl   T3, Uptake   Result Value Ref Range    T3 Uptake 27 24 - 39 %   Results for orders placed or performed in visit on 03/05/19   Adult Transthoracic Echo Complete W/ Cont if Necessary Per Protocol   Result Value Ref Range    BSA 1.6 m^2    IVSd 0.83 cm    IVSs 0.74 cm    LVIDd 3.5 cm    LVIDs 2.3 cm    LVPWd 0.74 cm    BH CV ECHO ANGELIQUE - LVPWS 0.98 cm    IVS/LVPW  1.1     FS 34.6 %    EDV(Teich) 49.8 ml    ESV(Teich) 17.5 ml    EF(Teich) 64.8 %    EDV(cubed) 41.8 ml    ESV(cubed) 11.7 ml    EF(cubed) 72.0 %    % IVS thick -11.2 %    % LVPW thick 33.4 %    LV mass(C)d 72.2 grams    LV mass(C)dI 46.2 grams/m^2    LV mass(C)s 43.7 grams    LV mass(C)sI 28.0 grams/m^2    SV(Teich) 32.3 ml    SI(Teich) 20.7 ml/m^2    SV(cubed) 30.1 ml    SI(cubed) 19.2 ml/m^2    Ao root diam 2.7 cm    Ao root area 5.5 cm^2    ACS 1.8 cm    LA dimension 3.0 cm    LA/Ao 1.1     LVOT diam 1.9 cm    LVOT area 2.8 cm^2    LVOT area(traced) 2.8 cm^2    Ao root area (BSA corrected) 1.7     MV E max nahid 87.3 cm/sec    MV A max nahid 50.9 cm/sec    MV E/A 1.7     MV dec slope 409.0 cm/sec^2    Ao pk nahid 100.0 cm/sec    Ao max PG 4.0 mmHg    Ao max PG (full) 0 mmHg    Ao V2 mean 67.7 cm/sec    Ao mean PG 2.0 mmHg    Ao mean PG (full) 0 mmHg    Ao V2 VTI 20.1 cm    WALLACE(I,A) 3.0 cm^2    WALLACE(I,D) 3.0 cm^2    WALLACE(V,A) 2.8 cm^2    WALLACE(V,D) 2.8 cm^2    LV V1 max PG 4.0 mmHg    LV V1 mean PG 2.0 mmHg    LV V1 max 100.0 cm/sec    LV V1 mean 70.1 cm/sec    LV V1 VTI 21.5 cm    MR max nahid 259.0 cm/sec    MR max PG 26.8 mmHg    SV(Ao) 110.9 ml    SI(Ao) 70.9 ml/m^2    SV(LVOT) 61.0 ml    SI(LVOT) 39.0 ml/m^2    PA V2 max 75.4 cm/sec    PA max PG 2.3 mmHg    PA V2 mean 42.4 cm/sec    PA mean PG 1.0 mmHg    PA V2 VTI 12.5 cm    TR max nahid 179.5 cm/sec    RVSP(TR) 23.4 mmHg    RAP systole 10.0 mmHg     CV ECHO ANGELIQUE - BZI_BMI 20.3 kilograms/m^2     CV ECHO ANGELIQUE - BSA(HAYCOCK) 1.6 m^2     CV ECHO ANGELIQUE - BZI_METRIC_WEIGHT 53.5 kg     CV ECHO ANGELIQUE - BZI_METRIC_HEIGHT 162.6 cm    Target HR (85%) 168 bpm    Max. Pred. HR (100%) 198 bpm    Echo EF Estimated 60 %     *Note: Due to a large number of results and/or encounters for the requested time period, some results have not been displayed. A complete set of results can be found in Results Review.

## 2020-05-27 NOTE — PATIENT INSTRUCTIONS

## 2020-05-30 PROCEDURE — U0003 INFECTIOUS AGENT DETECTION BY NUCLEIC ACID (DNA OR RNA); SEVERE ACUTE RESPIRATORY SYNDROME CORONAVIRUS 2 (SARS-COV-2) (CORONAVIRUS DISEASE [COVID-19]), AMPLIFIED PROBE TECHNIQUE, MAKING USE OF HIGH THROUGHPUT TECHNOLOGIES AS DESCRIBED BY CMS-2020-01-R: HCPCS | Performed by: INTERNAL MEDICINE

## 2020-05-31 LAB
COVID LABCORP PRIORITY: NORMAL
SARS-COV-2 RNA RESP QL NAA+PROBE: NOT DETECTED

## 2020-06-02 ENCOUNTER — HOSPITAL ENCOUNTER (OUTPATIENT)
Facility: HOSPITAL | Age: 25
Setting detail: HOSPITAL OUTPATIENT SURGERY
Discharge: HOME | End: 2020-06-02
Attending: INTERNAL MEDICINE | Admitting: INTERNAL MEDICINE

## 2020-06-02 ENCOUNTER — ANESTHESIA EVENT (OUTPATIENT)
Dept: GASTROENTEROLOGY | Facility: HOSPITAL | Age: 25
End: 2020-06-02

## 2020-06-02 ENCOUNTER — ANESTHESIA (OUTPATIENT)
Dept: GASTROENTEROLOGY | Facility: HOSPITAL | Age: 25
End: 2020-06-02

## 2020-06-02 VITALS
WEIGHT: 126 LBS | OXYGEN SATURATION: 99 % | BODY MASS INDEX: 21.51 KG/M2 | RESPIRATION RATE: 18 BRPM | SYSTOLIC BLOOD PRESSURE: 105 MMHG | HEIGHT: 64 IN | HEART RATE: 62 BPM | DIASTOLIC BLOOD PRESSURE: 58 MMHG | TEMPERATURE: 98.8 F

## 2020-06-02 DIAGNOSIS — R11.0 NAUSEA: ICD-10-CM

## 2020-06-02 DIAGNOSIS — K21.9 GASTROESOPHAGEAL REFLUX DISEASE, ESOPHAGITIS PRESENCE NOT SPECIFIED: ICD-10-CM

## 2020-06-02 DIAGNOSIS — R10.10 UPPER ABDOMINAL PAIN: ICD-10-CM

## 2020-06-02 PROCEDURE — 43239 EGD BIOPSY SINGLE/MULTIPLE: CPT | Performed by: INTERNAL MEDICINE

## 2020-06-02 PROCEDURE — 43248 EGD GUIDE WIRE INSERTION: CPT | Performed by: INTERNAL MEDICINE

## 2020-06-02 PROCEDURE — 25010000002 PROPOFOL 10 MG/ML EMULSION: Performed by: NURSE ANESTHETIST, CERTIFIED REGISTERED

## 2020-06-02 PROCEDURE — 25010000002 FENTANYL CITRATE (PF) 100 MCG/2ML SOLUTION: Performed by: NURSE ANESTHETIST, CERTIFIED REGISTERED

## 2020-06-02 RX ORDER — DEXTROSE AND SODIUM CHLORIDE 5; .45 G/100ML; G/100ML
30 INJECTION, SOLUTION INTRAVENOUS CONTINUOUS PRN
Status: DISCONTINUED | OUTPATIENT
Start: 2020-06-02 | End: 2020-06-02 | Stop reason: HOSPADM

## 2020-06-02 RX ORDER — LIDOCAINE HYDROCHLORIDE 20 MG/ML
INJECTION, SOLUTION EPIDURAL; INFILTRATION; INTRACAUDAL; PERINEURAL AS NEEDED
Status: DISCONTINUED | OUTPATIENT
Start: 2020-06-02 | End: 2020-06-02 | Stop reason: SURG

## 2020-06-02 RX ORDER — FENTANYL CITRATE 50 UG/ML
INJECTION, SOLUTION INTRAMUSCULAR; INTRAVENOUS AS NEEDED
Status: DISCONTINUED | OUTPATIENT
Start: 2020-06-02 | End: 2020-06-02 | Stop reason: SURG

## 2020-06-02 RX ORDER — PROPOFOL 10 MG/ML
VIAL (ML) INTRAVENOUS AS NEEDED
Status: DISCONTINUED | OUTPATIENT
Start: 2020-06-02 | End: 2020-06-02 | Stop reason: SURG

## 2020-06-02 RX ORDER — ONDANSETRON 2 MG/ML
4 INJECTION INTRAMUSCULAR; INTRAVENOUS ONCE AS NEEDED
Status: DISCONTINUED | OUTPATIENT
Start: 2020-06-02 | End: 2020-06-02 | Stop reason: HOSPADM

## 2020-06-02 RX ADMIN — PROPOFOL 40 MG: 10 INJECTION, EMULSION INTRAVENOUS at 14:48

## 2020-06-02 RX ADMIN — FENTANYL CITRATE 50 MCG: 50 INJECTION, SOLUTION INTRAMUSCULAR; INTRAVENOUS at 14:50

## 2020-06-02 RX ADMIN — DEXTROSE AND SODIUM CHLORIDE 30 ML/HR: 5; 450 INJECTION, SOLUTION INTRAVENOUS at 14:33

## 2020-06-02 RX ADMIN — LIDOCAINE HYDROCHLORIDE 60 MG: 20 INJECTION, SOLUTION EPIDURAL; INFILTRATION; INTRACAUDAL; PERINEURAL at 14:47

## 2020-06-02 RX ADMIN — PROPOFOL 60 MG: 10 INJECTION, EMULSION INTRAVENOUS at 14:47

## 2020-06-02 RX ADMIN — FENTANYL CITRATE 50 MCG: 50 INJECTION, SOLUTION INTRAMUSCULAR; INTRAVENOUS at 14:45

## 2020-06-02 RX ADMIN — PROPOFOL 40 MG: 10 INJECTION, EMULSION INTRAVENOUS at 14:51

## 2020-06-02 NOTE — ANESTHESIA POSTPROCEDURE EVALUATION
Patient: Toshia Barraza    Procedure Summary     Date:  06/02/20 Room / Location:  [facility] ENDOSCOPY 3 / [facility] ENDOSCOPY    Anesthesia Start:  1445 Anesthesia Stop:  1456    Procedure:  ESOPHAGOGASTRODUODENOSCOPY possible dilation (N/A ) Diagnosis:       Nausea      Upper abdominal pain      Gastroesophageal reflux disease, esophagitis presence not specified      (Nausea [R11.0])      (Upper abdominal pain [R10.10])      (Gastroesophageal reflux disease, esophagitis presence not specified [K21.9])    Surgeon:  Aidan Alvarado MD Provider:  Namrata Macario CRNA    Anesthesia Type:  MAC ASA Status:  2          Anesthesia Type: MAC    Vitals  No vitals data found for the desired time range.          Post Anesthesia Care and Evaluation    Patient location during evaluation: bedside  Patient participation: complete - patient participated  Level of consciousness: sleepy but conscious  Pain score: 0  Pain management: adequate  Airway patency: patent  Anesthetic complications: No anesthetic complications  PONV Status: none  Cardiovascular status: acceptable  Respiratory status: acceptable  Hydration status: acceptable

## 2020-06-02 NOTE — ANESTHESIA PREPROCEDURE EVALUATION
Anesthesia Evaluation     Patient summary reviewed and Nursing notes reviewed   NPO Solid Status: > 8 hours  NPO Liquid Status: > 2 hours           Airway   Mallampati: II  TM distance: >3 FB  Neck ROM: full  No difficulty expected  Dental - normal exam     Pulmonary - normal exam   Cardiovascular - negative cardio ROS and normal exam  Exercise tolerance: good (4-7 METS)        Neuro/Psych  (+) headaches, dizziness/light headedness, numbness, psychiatric history Anxiety,     GI/Hepatic/Renal/Endo    (+)  GERD well controlled,      Musculoskeletal     (+) neck pain,   Abdominal  - normal exam   Substance History   (+) alcohol use (occasional),      OB/GYN negative ob/gyn ROS   (-)  Pregnant    Comment: Signed declination form        Other                        Anesthesia Plan    ASA 2     MAC     intravenous induction     Anesthetic plan, all risks, benefits, and alternatives have been provided, discussed and informed consent has been obtained with: patient.

## 2020-06-03 ENCOUNTER — HOSPITAL ENCOUNTER (OUTPATIENT)
Dept: ULTRASOUND IMAGING | Facility: HOSPITAL | Age: 25
Discharge: HOME | End: 2020-06-03
Admitting: NURSE PRACTITIONER

## 2020-06-03 DIAGNOSIS — R10.10 UPPER ABDOMINAL PAIN: ICD-10-CM

## 2020-06-03 DIAGNOSIS — R11.0 NAUSEA: ICD-10-CM

## 2020-06-03 PROCEDURE — 76705 ECHO EXAM OF ABDOMEN: CPT

## 2020-06-05 ENCOUNTER — TELEPHONE (OUTPATIENT)
Dept: GASTROENTEROLOGY | Facility: CLINIC | Age: 25
End: 2020-06-05

## 2020-06-05 LAB
LAB AP CASE REPORT: NORMAL
PATH REPORT.FINAL DX SPEC: NORMAL

## 2020-06-05 NOTE — TELEPHONE ENCOUNTER
Patient coming in 6-9-2020      ----- Message from LATRICE Scott sent at 6/3/2020  1:08 PM CDT -----  Please call patient with results

## 2020-06-09 ENCOUNTER — OFFICE VISIT (OUTPATIENT)
Dept: GASTROENTEROLOGY | Facility: CLINIC | Age: 25
End: 2020-06-09

## 2020-06-09 VITALS
BODY MASS INDEX: 21.31 KG/M2 | HEIGHT: 64 IN | SYSTOLIC BLOOD PRESSURE: 124 MMHG | DIASTOLIC BLOOD PRESSURE: 71 MMHG | WEIGHT: 124.8 LBS | HEART RATE: 81 BPM

## 2020-06-09 DIAGNOSIS — K21.00 GASTROESOPHAGEAL REFLUX DISEASE WITH ESOPHAGITIS: ICD-10-CM

## 2020-06-09 DIAGNOSIS — R11.0 NAUSEA: Primary | ICD-10-CM

## 2020-06-09 DIAGNOSIS — K22.2 STRICTURE AND STENOSIS OF ESOPHAGUS: ICD-10-CM

## 2020-06-09 DIAGNOSIS — R10.10 UPPER ABDOMINAL PAIN: ICD-10-CM

## 2020-06-09 PROCEDURE — 99214 OFFICE O/P EST MOD 30 MIN: CPT | Performed by: NURSE PRACTITIONER

## 2020-06-09 NOTE — PATIENT INSTRUCTIONS

## 2020-06-09 NOTE — PROGRESS NOTES
Chief Complaint   Patient presents with   • Abdominal Pain   • Constipation   • Diarrhea   • Difficulty Swallowing       Subjective    Toshia Barraza is a 25 y.o. female. she is here today for follow-up.    History of Present Illness  24-year-old female presents to discuss EGD results.  States since EGD she has felt much better swallowing has overall been much better denies any further globus sensation.  She still has some pain in her right neck and has follow-up planned with ENT physician in [place] to further evaluate.  Has greatly improved her reflux she would like to continue that.  EGD was completed 2020 noted benign-appearing stenosis which was dilated to 54 [place], mildly severe esophagitis gastritis and normal duodenum.  Antral biopsy noted minimal chronic gastritis negative for H. pylori, metaplasia, dysplasia or malignancy.  Esophageal biopsy noted reflux esophagitis no eosinophils identified.  Negative for metaplasia dysplasia or malignancy.  Ultrasound was completed which noted normal gallbladder with no stones were identified.  She still reports with intake she has cramping upper abdominal pain and nausea but denies any vomiting.     The following portions of the patient's history were reviewed and updated as appropriate:   Past Medical History:   Diagnosis Date   • Acute bacterial sinusitis    • Distorted body image    • Early onset of delivery before 37 weeks    • Encounter for  visit     Post mague care status   • Encounter for  visit      visit status   • Esophagitis 2019   • Malaise and fatigue    • Primigravida      Past Surgical History:   Procedure Laterality Date   • BREAST AUGMENTATION     • BREAST BIOPSY     • COLONOSCOPY N/A 4/10/2019    Procedure: COLONOSCOPY;  Surgeon: Aidan Alvarado MD;  Location: [facility] ENDOSCOPY;  Service: Gastroenterology   • ENDOSCOPY N/A 4/10/2019    Procedure: ESOPHAGOGASTRODUODENOSCOPY Wed/Fri;  Surgeon: Christiano  Aidan HEREDIA MD;  Location: [facility] ENDOSCOPY;  Service: Gastroenterology   • ENDOSCOPY N/A 2020    Procedure: ESOPHAGOGASTRODUODENOSCOPY possible dilation;  Surgeon: Aidan Alvarado MD;  Location: [facility] ENDOSCOPY;  Service: Gastroenterology;  Laterality: N/A;     Family History   Problem Relation Age of Onset   • Other Mother         hematologic disorder   • Heart disease Mother         ischemic heart disease   • Heart attack Mother    • Leukemia Mother    • Ovarian cancer Mother         questionable   • Heart disease Father    • Hypertension Father      OB History        2    Para   2    Term   1       1    AB        Living   2       SAB        TAB        Ectopic        Molar        Multiple        Live Births              Obstetric Comments   The baby had gastroschisis           Prior to Admission medications    Medication Sig Start Date End Date Taking? Authorizing Provider   butalbital-acetaminophen  MG tablet tablet Take 1 tablet by mouth Every 6 (Six) Hours As Needed (headache). 19  Yes Ciera Chinchilla APRN   cyanocobalamin 1000 MCG/ML injection Inject 1 mL into the appropriate muscle as directed by prescriber Every 28 (Twenty-Eight) Days. 3/15/19  Yes Ciera Chinchilla APRN   dexlansoprazole (DEXILANT) 60 MG capsule Take 1 capsule by mouth Daily for 180 days. 20 Yes Aaliyah Arrieta APRN   escitalopram (LEXAPRO) 10 MG tablet Take 1 tablet by mouth Daily. 19  Yes Ciera Chinchilla APRN   gabapentin (NEURONTIN) 100 MG capsule Take 1-3 capsules up to three times per day for nerve pain as needed. 20  Yes Ciera Chinchilla APRN   Lidocaine Viscous HCl (XYLOCAINE) 2 % solution Combine Alum-MG-Yesica [id number]/5ml susp 15ml & lid visc 2% solu 10 mL. Give 5 ml bid prn for GI pain x 4 wks, give [id number] 5/15/20  Yes Ciera Chinchilla APRN   ondansetron ODT (ZOFRAN-ODT) 4 MG disintegrating tablet Take 1 tablet by mouth Every 8 (Eight) Hours As Needed for  "Nausea or Vomiting for up to 12 doses. 11/15/19  Yes Kishore Barrera MD   sucralfate (CARAFATE) 1 GM/10ML suspension Take 10 mL by mouth 4 (Four) Times a Day With Meals & at Bedtime. 4/17/19  Yes Aaliyah Arrieta APRN     No Known Allergies  Social History     Socioeconomic History   • Marital status: Single     Spouse name: Not on file   • Number of children: Not on file   • Years of education: Not on file   • Highest education level: Not on file   Tobacco Use   • Smoking status: Never Smoker   • Smokeless tobacco: Never Used   Substance and Sexual Activity   • Alcohol use: No     Frequency: Never   • Drug use: No   • Sexual activity: Defer       Review of Systems  Review of Systems   Constitutional: Positive for fatigue. Negative for activity change, appetite change, chills, diaphoresis, fever and unexpected weight change.   HENT: Negative for sore throat and trouble swallowing.    Respiratory: Negative for shortness of breath.    Gastrointestinal: Positive for abdominal pain (sharp pain with any intake ) and nausea. Negative for abdominal distention, anal bleeding, blood in stool, constipation, diarrhea, rectal pain and vomiting.   Musculoskeletal: Negative for arthralgias.   Skin: Negative for pallor.   Neurological: Negative for light-headedness.        /71 (BP Location: Left arm, Patient Position: Sitting)   Pulse 81   Ht 162.6 cm (64\")   Wt 56.6 kg (124 lb 12.8 oz)   LMP 05/11/2020   BMI 21.42 kg/m²     Objective    Physical Exam   Constitutional: She is oriented to person, place, and time. She appears well-developed and well-nourished. She is cooperative. No distress.   HENT:   Head: Normocephalic and atraumatic.   Neck: Normal range of motion. Neck supple. No thyromegaly present.   Cardiovascular: Normal rate, regular rhythm and normal heart sounds.   Pulmonary/Chest: Effort normal and breath sounds normal. She has no wheezes. She has no rhonchi. She has no rales.   Abdominal: Soft. Normal " appearance and bowel sounds are normal. She exhibits no distension. There is no hepatosplenomegaly. There is tenderness in the right upper quadrant. There is no rigidity and no guarding. No hernia.   Lymphadenopathy:     She has no cervical adenopathy.   Neurological: She is alert and oriented to person, place, and time.   Skin: Skin is warm, dry and intact. No rash noted. No pallor.   Psychiatric: She has a normal mood and affect. Her speech is normal.     Admission on 06/02/2020, Discharged on 06/02/2020   Component Date Value Ref Range Status   • SARS-CoV-2, DEMETRIO 05/30/2020 Not Detected  Not Detected Final    This test was developed and its performance characteristics determined  by [facility]. This test has not been FDA cleared or  approved. This test has been authorized by FDA under an Emergency Use  Authorization (EUA). This test is only authorized for the duration of  time the declaration that circumstances exist justifying the  authorization of the emergency use of in vitro diagnostic tests for  detection of SARS-CoV-2 virus and/or diagnosis of COVID-19 infection  under section 564(b)(1) of the Act, 21 U.S.C. 360bbb-3(b)(1), unless  the authorization is terminated or revoked sooner.  When diagnostic testing is negative, the possibility of a false  negative result should be considered in the context of a patient's  recent exposures and the presence of clinical signs and symptoms  consistent with COVID-19. An individual without symptoms of COVID-19  and who is not shedding SARS-CoV-2 virus would expect to have a  negative (not detected) result in this assay.   • COVID [facility] PRIORITY 05/30/2020 Comment   Final    Received   • Case Report 06/02/2020    Final                    Value:Surgical Pathology Report                         Case: [id number]                                  Authorizing Provider:  Aidan Alvarado MD        Collected:           06/02/2020 02:56 PM          Ordering Location:      [facility]             Received:            06/03/2020 06:50 AM                                 [place] [facility]                                                     Pathologist:           Jeffrey Oneal MD                                                          Specimens:   1) - Gastric, Antrum                                                                                2) - Esophagus, Distal                                                                    • Final Diagnosis 06/02/2020    Final                    Value:This result contains rich text formatting which cannot be displayed here.     Assessment/Plan      1. Nausea    2. Upper abdominal pain    3. Gastroesophageal reflux disease with esophagitis    4. Stricture and stenosis of esophagus    .   Continue Dexilant daily.  Discussed importance of dietary modifications and avoidance of gastric irritants.  Discussed if dysphagia symptoms recur or worsen she will need repeat EGD she will contact office if that occurs.  We will also repeat patient HIDA scan due to continual upper abdominal pain and nausea.    Orders placed during this encounter include:  Orders Placed This Encounter   Procedures   • NM Hepatobiliary Without CCK     Standing Status:   Future     Standing Expiration Date:   6/9/2021     Order Specific Question:   Patient Pregnant     Answer:   No       * Surgery not found *    Review and/or summary of lab tests, radiology, procedures, medications. Review and summary of old records and obtaining of history. The risks and benefits of my recommendations, as well as other treatment options were discussed with the patient today. Questions were answered.    No orders of the defined types were placed in this encounter.      Follow-up: Return in about 4 weeks (around 7/7/2020) for After test.          This document has been electronically signed by LATRICE Pearce on June 9, 2020 12:58             Results for orders placed or performed  during the hospital encounter of 06/02/20   COVID [facility] Priority - Swab, Nasopharynx   Result Value Ref Range    COVID [facility] PRIORITY Comment    COVID-19,[facility] ROUTINE, NP/OP SWAB IN TRANSPORT MEDIA OR ESWAB 72 HR TAT - Swab, Nasopharynx   Result Value Ref Range    SARS-CoV-2, DEMETRIO Not Detected Not Detected   Tissue Pathology Exam   Result Value Ref Range    Case Report       Surgical Pathology Report                         Case: [id number]                                  Authorizing Provider:  Aidan Alvarado MD        Collected:           06/02/2020 02:56 PM          Ordering Location:     [facility]             Received:            06/03/2020 06:50 AM                                 [place] [facility]                                                     Pathologist:           Jeffrey Oneal MD                                                          Specimens:   1) - Gastric, Antrum                                                                                2) - Esophagus, Distal                                                                     Final Diagnosis       SEE SCANNED REPORT     Results for orders placed or performed in visit on 09/04/19   Urinalysis, Microscopic Only - Urine, Clean Catch   Result Value Ref Range    RBC, UA 3-5 (A) None Seen, 0-2 /HPF    WBC, UA Too Numerous to Count (A) None Seen, 0-2 /HPF    Bacteria, UA Trace (A) None Seen /HPF    Squamous Epithelial Cells, UA 0-2 None Seen, 0-2 /HPF    Hyaline Casts, UA 0-2 None Seen /LPF    Methodology Manual Light Microscopy    Urinalysis With Culture If Indicated - Urine, Clean Catch   Result Value Ref Range    Color, UA Yellow Yellow, Straw    Appearance, UA Cloudy (A) Clear    pH, UA 6.0 5.0 - 8.0    Specific Gravity, UA 1.011 1.005 - 1.030    Glucose, UA Negative Negative    Ketones, UA Negative Negative    Bilirubin, UA Negative Negative    Blood, UA Moderate (2+) (A) Negative    Protein, UA 30 mg/dL (1+) (A) Negative     Leuk Esterase, UA Large (3+) (A) Negative    Nitrite, UA Negative Negative    Urobilinogen, UA 0.2 E.U./dL 0.2 - 1.0 E.U./dL   Urine Culture - Urine, Urine, Clean Catch   Result Value Ref Range    Urine Culture >100,000 CFU/mL Escherichia coli (A)        Susceptibility    Escherichia coli - REY     Ampicillin <=2 Susceptible ug/ml     Ampicillin + Sulbactam <=2 Susceptible ug/ml     Cefazolin <=4 Susceptible ug/ml     Cefepime <=1 Susceptible ug/ml     Ceftazidime <=1 Susceptible ug/ml     Ceftriaxone <=1 Susceptible ug/ml     Gentamicin <=1 Susceptible ug/ml     Levofloxacin <=0.12 Susceptible ug/ml     Nitrofurantoin <=16 Susceptible ug/ml     Piperacillin + Tazobactam <=4 Susceptible ug/ml     Tetracycline <=1 Susceptible ug/ml     Trimethoprim + Sulfamethoxazole <=20 Susceptible ug/ml   Results for orders placed or performed in visit on 09/04/19   Chlamydia trachomatis, Neisseria gonorrhoeae, Trichomonas vaginalis, PCR - Swab, Cervix   Result Value Ref Range    Chlamydia DNA by PCR Negative Negative    Neisseria gonorrhoeae by PCR Negative Negative    Trichomonas vaginalis PCR Negative    Gardnerella vaginalis, Trichomonas vaginalis, Candida albicans, DNA - Swab, Vagina   Result Value Ref Range    CANDIDA ALBICANS Negative     GARDNERELLA VAGINALIS Positive     TRICHOMONAS VAGINALIS Negative    Results for orders placed or performed during the hospital encounter of 04/10/19   Tissue Pathology Exam   Result Value Ref Range    Case Report       Surgical Pathology Report                         Case: [id number]                                  Authorizing Provider:  Aidan Alvarado MD        Collected:           04/10/2019 12:36 PM          Ordering Location[facility]     [facility]             Received:            04/10/2019 01:49 PM                                 [place] [facility]                                                     Pathologist:           Jeffrey Goldberg MD                                                          Specimens:   1) - Small Intestine, Duodenum                                                                      2) - Gastric, Antrum                                                                                3) - Esophagus, Distal                                                                              4) - Small Intestine, Ileum                                                                         5) - Large Intestine, colonic mucousa                                                      Final Diagnosis       1.  MUCOSA, DUODENUM:   NO SIGNIFICANT HISTOLOGIC ABNORMALITY.     2.  MUCOSA, ANTRUM OF STOMACH:   CHRONIC GASTRITIS.   NEGATIVE FOR HELICOBACTER PYLORI (HP IMMUNOSTAIN).     3.  MUCOSA, DISTAL ESOPHAGUS:   MILD CHRONIC ESOPHAGITIS INVOLVING SQUAMOUS MUCOSA.    4.  MUCOSA, ILEUM:   NO SIGNIFICANT HISTOLOGIC ABNORMALITY.     5.  MUCOSA, COLON:   NO SIGNIFICANT HISTOLOGIC ABNORMALITY.       Comment       Helicobacter pylori (HP) immunostain is performed (block 2) because an appropriate inflammatory milieu is present and organisms are not seen on H & E stained slides.     HP immunostain was developed and its performance characteristics determined by [facility].  It has not been cleared or approved by the U.S.Food and Drug Administration.  The FDA has determined that such clearance of approval is not necessary.  This test is used for clinical purposes.  It should not be regarded as investigational or for research.  This laboratory is certified under the Clinical Laboratory Amendments of 1988 (CLIA-88) as qualified to perform high complexity clinical laboratory testing.     All controls show appropriate reactivity.        Gross Description       Received for examination are 5 containers, each of which have nodular bits of white soft tissue measuring 0.3-0.5 cc in aggregate.  All specimens are embedded as labeled:  1A duodenum; 2A antrum of  stomach; 3A distal esophagus; 4A ileum; 5A mucosa of colon.      Pregnancy, Urine - Urine, Clean Catch   Result Value Ref Range    HCG, Urine QL Negative Negative   Results for orders placed or performed in visit on 03/21/19   Pregnancy, Urine - Urine, Clean Catch   Result Value Ref Range    HCG, Urine QL Negative Negative   Results for orders placed or performed in visit on 03/11/19   CBC Auto Differential   Result Value Ref Range    WBC 7.34 3.20 - 9.80 10*3/mm3    RBC 4.38 3.77 - 5.16 10*6/mm3    Hemoglobin 13.0 12.0 - 15.5 g/dL    Hematocrit 38.7 35.0 - 45.0 %    MCV 88.4 80.0 - 98.0 fL    MCH 29.7 26.5 - 34.0 pg    MCHC 33.6 31.4 - 36.0 g/dL    RDW 12.2 11.5 - 14.5 %    RDW-SD 38.7 36.4 - 46.3 fl    MPV 11.3 8.0 - 12.0 fL    Platelets 200 150 - 450 10*3/mm3    Neutrophil % 66.9 37.0 - 80.0 %    Lymphocyte % 23.4 10.0 - 50.0 %    Monocyte % 7.1 0.0 - 12.0 %    Eosinophil % 2.5 0.0 - 7.0 %    Basophil % 0.1 0.0 - 2.0 %    Neutrophils, Absolute 4.91 2.00 - 8.60 10*3/mm3    Lymphocytes, Absolute 1.72 0.60 - 4.20 10*3/mm3    Monocytes, Absolute 0.52 0.00 - 0.90 10*3/mm3    Eosinophils, Absolute 0.18 0.00 - 0.70 10*3/mm3    Basophils, Absolute 0.01 0.00 - 0.20 10*3/mm3   Vitamin B12   Result Value Ref Range    Vitamin B-12 419 239 - 931 pg/mL   Lipid Panel   Result Value Ref Range    Total Cholesterol 133 (L) 150 - 200 mg/dL    Triglycerides 64 <=150 mg/dL    HDL Cholesterol 76 (H) 40 - 59 mg/dL    LDL Cholesterol  44 <=100 mg/dL    VLDL Cholesterol 12.8 mg/dL    LDL/HDL Ratio 0.58 0.00 - 3.22   Comprehensive Metabolic Panel   Result Value Ref Range    Glucose 91 74 - 99 mg/dL    BUN 13 7 - 17 mg/dL    Creatinine 0.64 0.52 - 1.04 mg/dL    Sodium 138 137 - 145 mmol/L    Potassium 3.8 3.4 - 5.0 mmol/L    Chloride 105 98 - 107 mmol/L    CO2 20.0 (L) 22.0 - 30.0 mmol/L    Calcium 8.5 8.4 - 10.2 mg/dL    Total Protein 7.4 6.3 - 8.2 g/dL    Albumin 4.70 3.50 - 5.00 g/dL    ALT (SGPT) 17 <=35 U/L    AST (SGOT) 16 14 - 36  U/L    Alkaline Phosphatase 58 38 - 126 U/L    Total Bilirubin 0.5 0.2 - 1.3 mg/dL    eGFR Non  Amer 116 71 - 165 mL/min/1.73    Globulin 2.7 2.3 - 3.5 gm/dL    A/G Ratio 1.7 1.1 - 1.8 g/dL    BUN/Creatinine Ratio 20.3 7.0 - 25.0    Anion Gap 13.0 5.0 - 15.0 mmol/L   Results for orders placed or performed in visit on 03/07/19   Thyroid Antibodies   Result Value Ref Range    Thyroid Peroxidase Antibody 9 0 - 34 IU/mL    Thyroglobulin Ab <1.0 0.0 - 0.9 IU/mL   T3   Result Value Ref Range    T3, Total 126.0 97.0 - 169.0 ng/dl   T3, Uptake   Result Value Ref Range    T3 Uptake 27 24 - 39 %   Results for orders placed or performed in visit on 03/05/19   Adult Transthoracic Echo Complete W/ Cont if Necessary Per Protocol   Result Value Ref Range    BSA 1.6 m^2    IVSd 0.83 cm    IVSs 0.74 cm    LVIDd 3.5 cm    LVIDs 2.3 cm    LVPWd 0.74 cm    BH CV ECHO ANGELIQUE - LVPWS 0.98 cm    IVS/LVPW 1.1     FS 34.6 %    EDV(Teich) 49.8 ml    ESV(Teich) 17.5 ml    EF(Teich) 64.8 %    EDV(cubed) 41.8 ml    ESV(cubed) 11.7 ml    EF(cubed) 72.0 %    % IVS thick -11.2 %    % LVPW thick 33.4 %    LV mass(C)d 72.2 grams    LV mass(C)dI 46.2 grams/m^2    LV mass(C)s 43.7 grams    LV mass(C)sI 28.0 grams/m^2    SV(Teich) 32.3 ml    SI(Teich) 20.7 ml/m^2    SV(cubed) 30.1 ml    SI(cubed) 19.2 ml/m^2    Ao root diam 2.7 cm    Ao root area 5.5 cm^2    ACS 1.8 cm    LA dimension 3.0 cm    LA/Ao 1.1     LVOT diam 1.9 cm    LVOT area 2.8 cm^2    LVOT area(traced) 2.8 cm^2    Ao root area (BSA corrected) 1.7     MV E max nahid 87.3 cm/sec    MV A max nahid 50.9 cm/sec    MV E/A 1.7     MV dec slope 409.0 cm/sec^2    Ao pk nahid 100.0 cm/sec    Ao max PG 4.0 mmHg    Ao max PG (full) 0 mmHg    Ao V2 mean 67.7 cm/sec    Ao mean PG 2.0 mmHg    Ao mean PG (full) 0 mmHg    Ao V2 VTI 20.1 cm    WALLACE(I,A) 3.0 cm^2    WALLACE(I,D) 3.0 cm^2    WALLACE(V,A) 2.8 cm^2    WALLACE(V,D) 2.8 cm^2    LV V1 max PG 4.0 mmHg    LV V1 mean PG 2.0 mmHg    LV V1 max 100.0 cm/sec    LV  V1 mean 70.1 cm/sec    LV V1 VTI 21.5 cm    MR max nahid 259.0 cm/sec    MR max PG 26.8 mmHg    SV(Ao) 110.9 ml    SI(Ao) 70.9 ml/m^2    SV(LVOT) 61.0 ml    SI(LVOT) 39.0 ml/m^2    PA V2 max 75.4 cm/sec    PA max PG 2.3 mmHg    PA V2 mean 42.4 cm/sec    PA mean PG 1.0 mmHg    PA V2 VTI 12.5 cm    TR max nahid 179.5 cm/sec    RVSP(TR) 23.4 mmHg    RAP systole 10.0 mmHg     CV ECHO ANGELIQUE - BZI_BMI 20.3 kilograms/m^2     CV ECHO ANGELIQUE - BSA(HAYCOCK) 1.6 m^2     CV ECHO ANGELIQUE - BZI_METRIC_WEIGHT 53.5 kg     CV ECHO ANGELIQUE - BZI_METRIC_HEIGHT 162.6 cm    Target HR (85%) 168 bpm    Max. Pred. HR (100%) 198 bpm    Echo EF Estimated 60 %     *Note: Due to a large number of results and/or encounters for the requested time period, some results have not been displayed. A complete set of results can be found in Results Review.

## 2020-07-13 ENCOUNTER — TELEPHONE (OUTPATIENT)
Dept: FAMILY MEDICINE CLINIC | Facility: CLINIC | Age: 25
End: 2020-07-13

## 2020-07-13 DIAGNOSIS — B89 PARASITIC INFECTION: Primary | ICD-10-CM

## 2020-07-13 NOTE — TELEPHONE ENCOUNTER
SHE FOUND MAGGOTTS IN HER DRINK FROM [place] AND SHE HAS BEEN NAUSEAS AND WONDERS IF SHE NEEDS SOME SORT OF MEDICATION FOR PARASITES

## 2020-07-16 DIAGNOSIS — B89 PARASITIC INFECTION: Primary | ICD-10-CM

## 2020-07-16 RX ORDER — IVERMECTIN 3 MG/1
9 TABLET ORAL ONCE
Qty: 3 TABLET | Refills: 0 | Status: SHIPPED | OUTPATIENT
Start: 2020-07-16 | End: 2020-07-16

## 2020-07-16 NOTE — TELEPHONE ENCOUNTER
Most likely she is okay but I sent in vermox bid x three days to cover for parasitic infections just ot be safe, she can also get parasitic treatments over the counter just so she knows for the future. Let me know if symptoms do not improve over the next few days or resolve.

## 2020-07-23 ENCOUNTER — OFFICE VISIT (OUTPATIENT)
Dept: OBSTETRICS AND GYNECOLOGY | Facility: CLINIC | Age: 25
End: 2020-07-23

## 2020-07-23 ENCOUNTER — PRIOR AUTHORIZATION (OUTPATIENT)
Dept: FAMILY MEDICINE CLINIC | Facility: CLINIC | Age: 25
End: 2020-07-23

## 2020-07-23 VITALS
WEIGHT: 124.8 LBS | DIASTOLIC BLOOD PRESSURE: 68 MMHG | HEIGHT: 64 IN | SYSTOLIC BLOOD PRESSURE: 112 MMHG | BODY MASS INDEX: 21.31 KG/M2

## 2020-07-23 DIAGNOSIS — K59.00 CONSTIPATION, UNSPECIFIED CONSTIPATION TYPE: ICD-10-CM

## 2020-07-23 DIAGNOSIS — Z87.42 HISTORY OF OVARIAN CYST: ICD-10-CM

## 2020-07-23 DIAGNOSIS — R10.2 PELVIC PRESSURE IN FEMALE: Primary | ICD-10-CM

## 2020-07-23 DIAGNOSIS — N32.89 BLADDER DISTENTION: ICD-10-CM

## 2020-07-23 LAB
BILIRUB UR QL STRIP: NEGATIVE
CLARITY UR: ABNORMAL
COLOR UR: YELLOW
GLUCOSE UR STRIP-MCNC: NEGATIVE MG/DL
HGB UR QL STRIP.AUTO: NEGATIVE
KETONES UR QL STRIP: NEGATIVE
LEUKOCYTE ESTERASE UR QL STRIP.AUTO: NEGATIVE
NITRITE UR QL STRIP: NEGATIVE
PH UR STRIP.AUTO: 7 [PH] (ref 5–9)
PROT UR QL STRIP: NEGATIVE
SP GR UR STRIP: 1.02 (ref 1–1.03)
UROBILINOGEN UR QL STRIP: ABNORMAL

## 2020-07-23 PROCEDURE — 87661 TRICHOMONAS VAGINALIS AMPLIF: CPT | Performed by: OBSTETRICS & GYNECOLOGY

## 2020-07-23 PROCEDURE — 81003 URINALYSIS AUTO W/O SCOPE: CPT | Performed by: OBSTETRICS & GYNECOLOGY

## 2020-07-23 PROCEDURE — 87086 URINE CULTURE/COLONY COUNT: CPT | Performed by: OBSTETRICS & GYNECOLOGY

## 2020-07-23 PROCEDURE — 99214 OFFICE O/P EST MOD 30 MIN: CPT | Performed by: OBSTETRICS & GYNECOLOGY

## 2020-07-23 PROCEDURE — 87591 N.GONORRHOEAE DNA AMP PROB: CPT | Performed by: OBSTETRICS & GYNECOLOGY

## 2020-07-23 PROCEDURE — 87491 CHLMYD TRACH DNA AMP PROBE: CPT | Performed by: OBSTETRICS & GYNECOLOGY

## 2020-07-23 NOTE — TELEPHONE ENCOUNTER
FRANCISCO LUEVANO ([place]: [id number]) - [id number]  Emverm 100MG chewable tablets  Status: PA Response - Denied  Created: July 16th, 2020 [phone]  Sent: July 22nd, 2020

## 2020-07-23 NOTE — PROGRESS NOTES
"[facility]  Gynecology  Date of Service: 2020    CC: \"I have lots of BV infections and I have pelvic pressure\"    WENDY Barraza is a 25 y.o.  premenopausal female who presents with complaints of pelvic pressure.      She states that this has been going on for over 1 month.  She states that it is suprapubic pressure with some more intense pain on the left groin.  She states that her urine \"smells weird.\"  She works 2 jobs, one of which is with [employer] and is outside for long periods of time.  She states that she often does not have the urge to void and will void 1-2x per day.  She also states that \"coffee makes me sick.\"  She states that her stomach will hurt and she will be nauseous.  She reports that she has a BM every 1-2 days and it is soft but hard to get out.    She is worried because she had an ovarian cyst on an ultrasound last year and is worried that she may have another that is causing this pressure.  Ultrasound showed: Note is made of a 1.4 x 0.7 x 1.5 cm simple cyst in the right ovary, not requiring any imaging follow-up.    She reports that she often has vaginal discharge.  She states that it is white and thin.  It is worse during the week before her period but can be all of the time as well.    ROS  Review of Systems   Constitutional: Negative.    HENT: Negative.    Eyes: Negative.    Respiratory: Negative.    Cardiovascular: Negative.    Gastrointestinal: Positive for abdominal pain, constipation and nausea.   Endocrine: Negative.    Genitourinary: Positive for difficulty urinating, enuresis, pelvic pain and vaginal discharge. Negative for dysuria, flank pain, frequency and hematuria.   Musculoskeletal: Negative.    Skin: Negative.    Allergic/Immunologic: Negative.    Neurological: Negative.    Hematological: Negative.    Psychiatric/Behavioral: Negative.      GYN HISTORY  Menarche: age 14  Menses: Regular, every 28 days, lasts 2-3 days, sometimes " light/sometimes heavy, no intermenstrual bleeding  History of STIs: None per patient  Last pap smear:   Last Completed Pap Smear       Status Date      PAP SMEAR Done 2018 LIQUID-BASED PAP SMEAR, SCREENING      Abnormal pap smear history: None  Contraception: None     OB HISTORY  OB History    Para Term  AB Living   2 2 1 1   2   SAB TAB Ectopic Molar Multiple Live Births             2      # Outcome Date GA Lbr Hitesh/2nd Weight Sex Delivery Anes PTL Lv   2 Term    3310 g (7 lb 4.8 oz)  Vag-Spont   ABHISHEK   1  2012     Vag-Spont   ABHISHEK      Complications: Gastroschisis     PAST MEDICAL HISTORY  Past Medical History:   Diagnosis Date   • Distorted body image    • Esophagitis 2019     PAST SURGICAL HISTORY  Past Surgical History:   Procedure Laterality Date   • BREAST AUGMENTATION     • BREAST BIOPSY     • COLONOSCOPY N/A 4/10/2019    Procedure: COLONOSCOPY;  Surgeon: Aidan Alvarado MD;  Location: [facility] ENDOSCOPY;  Service: Gastroenterology   • ENDOSCOPY N/A 4/10/2019    Procedure: ESOPHAGOGASTRODUODENOSCOPY Wed/Fri;  Surgeon: Aidan Alvarado MD;  Location: [facility] ENDOSCOPY;  Service: Gastroenterology   • ENDOSCOPY N/A 2020    Procedure: ESOPHAGOGASTRODUODENOSCOPY possible dilation;  Surgeon: Aidan Alvarado MD;  Location: [facility] ENDOSCOPY;  Service: Gastroenterology;  Laterality: N/A;     FAMILY HISTORY  Family History   Problem Relation Age of Onset   • Other Mother         hematologic disorder   • Heart disease Mother         ischemic heart disease   • Heart attack Mother    • Leukemia Mother    • Ovarian cancer Mother         questionable   • Heart disease Father    • Hypertension Father      SOCIAL HISTORY  Social History     Socioeconomic History   • Marital status: Single     Spouse name: Not on file   • Number of children: Not on file   • Years of education: Not on file   • Highest education level: Not on file   Tobacco Use   • Smoking status: Never Smoker   • Smokeless  "tobacco: Never Used   Substance and Sexual Activity   • Alcohol use: No     Frequency: Never   • Drug use: No   • Sexual activity: Defer     ALLERGIES  No Known Allergies    HOME MEDICATIONS  Prior to Admission medications    Medication Sig Start Date End Date Taking? Authorizing Provider   butalbital-acetaminophen  MG tablet tablet Take 1 tablet by mouth Every 6 (Six) Hours As Needed (headache). 7/26/19   Ciera Chinchilla APRN   cyanocobalamin 1000 MCG/ML injection Inject 1 mL into the appropriate muscle as directed by prescriber Every 28 (Twenty-Eight) Days. 3/15/19   Ciera Chinchilla APRN   dexlansoprazole (DEXILANT) 60 MG capsule Take 1 capsule by mouth Daily for 180 days. 5/27/20 11/23/20  Aaliyah Arrieta APRN   escitalopram (LEXAPRO) 10 MG tablet Take 1 tablet by mouth Daily. 6/6/19   Ciera Chinchilla APRN   gabapentin (NEURONTIN) 100 MG capsule Take 1-3 capsules up to three times per day for nerve pain as needed. 5/22/20   Ciera Chinchilla APRN   Lidocaine Viscous HCl (XYLOCAINE) 2 % solution Combine Alum-MG-Yesica [id number]/5ml susp 15ml & lid visc 2% solu 10 mL. Give 5 ml bid prn for GI pain x 4 wks, give [id number] 5/15/20   Ciera Chinchilla APRN   ondansetron ODT (ZOFRAN-ODT) 4 MG disintegrating tablet Take 1 tablet by mouth Every 8 (Eight) Hours As Needed for Nausea or Vomiting for up to 12 doses. 11/15/19   Kishore Barrera MD   sucralfate (CARAFATE) 1 GM/10ML suspension Take 10 mL by mouth 4 (Four) Times a Day With Meals & at Bedtime. 4/17/19   Aaliyah Arrieta APRN     PE  /68 (BP Location: Right arm, Patient Position: Sitting, Cuff Size: Adult)   Ht 162.6 cm (64\")   Wt 56.6 kg (124 lb 12.8 oz)   LMP 07/11/2020 (Within Weeks)   BMI 21.42 kg/m²        General: Alert, healthy, no distress, well nourished and well developed.  Neurologic: Alert, oriented to person, place, and time.  Gait normal.  Cranial nerves II-XII grossly intact.  HEENT: Normocephalic, atraumatic.  " Extraocular muscles intact, pupils equal and reactive times two.    Neck: Supple, no adenopathy, thyroid normal size, non-tender, without nodularity, trachea midline.  Lungs: Normal respiratory effort.  Clear to auscultation bilaterally.  No wheezes, rhonci, or rales.  Heart: Regular rate and rhythm.  No murmer, rub or gallop.  Abdomen: Soft, non-tender, non-distended,no masses, no hepatosplenomegaly, no hernia.  Skin: No rash, no lesions.  Extremities: No cyanosis, clubbing or edema.  PELVIC EXAM:  External Genitalia/Vulva: Anatomy is normal, no significant redness of labia, no discharge on vulvar tissues, Viola's and Bartholin's glands are normal, no ulcers, no condylomatous lesions.  Urethra: Normal, no lesions.  Vagina: Vaginal tissues are not inflamed, normal color and texture, no significant discharge present.  Thin white vaginal discharge present.  Cervix: Normal in appearance without lesions or purulent discharge, no cervical motion tenderness.  Uterus: Normal size, shape, and consistency.  Adnexa: Normal size and shape bilaterally, no palpable mass bilaterally and non-tender bilaterally.  Rectal: Normal, no masses or polyps, confirms bimanual exam, perianal normal, no lesions; ADÁN deferred.    IMPRESSION  Toshia Barraza is a 25 y.o.  presenting with pelvic pressure.  I doubt that her symptoms are related to gyn etiology; I suspect that she has a UTI or is related to bladder distention.    PLAN    1. Pelvic pressure in female  - Chlamydia trachomatis, Neisseria gonorrhoeae, Trichomonas vaginalis, PCR - Swab, Vagina  - US Non-ob Transvaginal; Future  - Urinalysis without microscopic (no culture) - Urine, Clean Catch; Future  - Urine Culture - Urine, Urine, Clean Catch  - Urinalysis without microscopic (no culture) - Urine, Clean Catch  - Recommend daily probiotic or yogurt to help balance vaginal pH.  Do NOT recommend soaps or vaginal douching.    2. Bladder distention  Discussed that she should  be going to the bathroom every 2-4 hours, and no longer than every 6 hours.  Recommended that she start making herself go to the bathroom every 4h or so to retrain her bladder.  Since she has held her urine for so long, the normal volume that should trigger the brain to void has increased.    3. History of ovarian cyst  I reviewed that it is not a true cyst but a follicle related to ovulation.  Reviewed that this is formed every month and then goes away.  She is relieved.    4. Constipation, unspecified constipation type  Encouraged stool softeners and fiber so that she has regular soft BMs.       This document has been electronically signed by Rosaline Barrett MD on July 23, 2020 14:34.

## 2020-07-24 LAB
BACTERIA SPEC AEROBE CULT: NO GROWTH
C TRACH RRNA CVX QL NAA+PROBE: NEGATIVE
N GONORRHOEA RRNA SPEC QL NAA+PROBE: NEGATIVE
TRICHOMONAS VAGINALIS PCR: NEGATIVE

## 2020-07-27 ENCOUNTER — TELEPHONE (OUTPATIENT)
Dept: OBSTETRICS AND GYNECOLOGY | Facility: CLINIC | Age: 25
End: 2020-07-27

## 2020-07-27 NOTE — TELEPHONE ENCOUNTER
----- Message from Rosaline Barrett MD sent at 7/24/2020  3:07 PM CDT -----  Please let her know that her urine showed no UTI.

## 2020-07-27 NOTE — TELEPHONE ENCOUNTER
Called and spoke with patient regarding results.   Let her know urine was negative for UTI, and once we receive results of oneswab we will give patient callback.    Patient verbalized understanding

## 2020-08-17 ENCOUNTER — TELEPHONE (OUTPATIENT)
Dept: OBSTETRICS AND GYNECOLOGY | Facility: CLINIC | Age: 25
End: 2020-08-17

## 2020-08-17 RX ORDER — AMPICILLIN 500 MG/1
500 CAPSULE ORAL 4 TIMES DAILY
Qty: 28 CAPSULE | Refills: 0 | Status: SHIPPED | OUTPATIENT
Start: 2020-08-17 | End: 2020-08-24

## 2020-08-17 NOTE — TELEPHONE ENCOUNTER
Patient returned call let her know one swab was positive for enterococcus and dr navarro would be sending in antibiotics,     Patient verbalized understanding. Sent prescription in to [facility] in [place] per pt request

## 2020-08-18 ENCOUNTER — OFFICE VISIT (OUTPATIENT)
Dept: FAMILY MEDICINE CLINIC | Facility: CLINIC | Age: 25
End: 2020-08-18

## 2020-08-18 VITALS
OXYGEN SATURATION: 99 % | DIASTOLIC BLOOD PRESSURE: 60 MMHG | RESPIRATION RATE: 16 BRPM | TEMPERATURE: 97.4 F | HEIGHT: 64 IN | SYSTOLIC BLOOD PRESSURE: 110 MMHG | BODY MASS INDEX: 21.72 KG/M2 | HEART RATE: 87 BPM | WEIGHT: 127.2 LBS

## 2020-08-18 DIAGNOSIS — L29.9 ITCHY SKIN: ICD-10-CM

## 2020-08-18 DIAGNOSIS — K21.00 GASTROESOPHAGEAL REFLUX DISEASE WITH ESOPHAGITIS: ICD-10-CM

## 2020-08-18 DIAGNOSIS — R59.9 SHOTTY LYMPH NODES: ICD-10-CM

## 2020-08-18 DIAGNOSIS — R10.2 PELVIC PAIN: ICD-10-CM

## 2020-08-18 DIAGNOSIS — R06.89 BREATHING DIFFICULTY: ICD-10-CM

## 2020-08-18 DIAGNOSIS — R10.33 UMBILICAL PAIN: ICD-10-CM

## 2020-08-18 DIAGNOSIS — R59.1 LYMPHADENOPATHY: Primary | ICD-10-CM

## 2020-08-18 DIAGNOSIS — R13.10 DYSPHAGIA, UNSPECIFIED TYPE: ICD-10-CM

## 2020-08-18 DIAGNOSIS — R41.840 DIFFICULTY CONCENTRATING: ICD-10-CM

## 2020-08-18 DIAGNOSIS — R34 DECREASED URINE OUTPUT: ICD-10-CM

## 2020-08-18 DIAGNOSIS — G90.A POTS (POSTURAL ORTHOSTATIC TACHYCARDIA SYNDROME): ICD-10-CM

## 2020-08-18 DIAGNOSIS — R22.1 NECK SWELLING: ICD-10-CM

## 2020-08-18 DIAGNOSIS — R53.83 OTHER FATIGUE: ICD-10-CM

## 2020-08-18 DIAGNOSIS — N89.8 VAGINAL DISCHARGE: ICD-10-CM

## 2020-08-18 PROCEDURE — 99214 OFFICE O/P EST MOD 30 MIN: CPT | Performed by: NURSE PRACTITIONER

## 2020-08-21 ENCOUNTER — RESULTS ENCOUNTER (OUTPATIENT)
Dept: OBSTETRICS AND GYNECOLOGY | Facility: CLINIC | Age: 25
End: 2020-08-21

## 2020-08-21 ENCOUNTER — LAB (OUTPATIENT)
Dept: LAB | Facility: OTHER | Age: 25
End: 2020-08-21

## 2020-08-21 DIAGNOSIS — N89.8 VAGINAL DISCHARGE: ICD-10-CM

## 2020-08-21 DIAGNOSIS — R53.83 OTHER FATIGUE: ICD-10-CM

## 2020-08-21 DIAGNOSIS — R22.1 NECK SWELLING: ICD-10-CM

## 2020-08-21 DIAGNOSIS — N89.8 VAGINAL DISCHARGE: Primary | ICD-10-CM

## 2020-08-21 DIAGNOSIS — R13.10 DYSPHAGIA, UNSPECIFIED TYPE: ICD-10-CM

## 2020-08-21 DIAGNOSIS — R59.1 LYMPHADENOPATHY: ICD-10-CM

## 2020-08-21 DIAGNOSIS — R59.9 SHOTTY LYMPH NODES: ICD-10-CM

## 2020-08-21 LAB
25(OH)D3 SERPL-MCNC: 42.1 NG/ML (ref 30–100)
ALBUMIN SERPL-MCNC: 4.6 G/DL (ref 3.5–5)
ALBUMIN/GLOB SERPL: 1.5 G/DL (ref 1.1–1.8)
ALP SERPL-CCNC: 62 U/L (ref 38–126)
ALT SERPL W P-5'-P-CCNC: 21 U/L
ANION GAP SERPL CALCULATED.3IONS-SCNC: 8 MMOL/L (ref 5–15)
AST SERPL-CCNC: 27 U/L (ref 14–36)
BASOPHILS # BLD AUTO: 0.03 10*3/MM3 (ref 0–0.2)
BASOPHILS NFR BLD AUTO: 0.7 % (ref 0–1.5)
BILIRUB SERPL-MCNC: 0.7 MG/DL (ref 0.2–1.3)
BUN SERPL-MCNC: 11 MG/DL (ref 7–23)
BUN/CREAT SERPL: 16.4 (ref 7–25)
CALCIUM SPEC-SCNC: 9.3 MG/DL (ref 8.4–10.2)
CHLORIDE SERPL-SCNC: 101 MMOL/L (ref 101–112)
CO2 SERPL-SCNC: 29 MMOL/L (ref 22–30)
CREAT SERPL-MCNC: 0.67 MG/DL (ref 0.52–1.04)
DEPRECATED RDW RBC AUTO: 38.8 FL (ref 37–54)
EOSINOPHIL # BLD AUTO: 0.11 10*3/MM3 (ref 0–0.4)
EOSINOPHIL NFR BLD AUTO: 2.5 % (ref 0.3–6.2)
ERYTHROCYTE [DISTWIDTH] IN BLOOD BY AUTOMATED COUNT: 12.1 % (ref 12.3–15.4)
ERYTHROCYTE [SEDIMENTATION RATE] IN BLOOD: 1 MM/HR (ref 0–20)
GFR SERPL CREATININE-BSD FRML MDRD: 109 ML/MIN/1.73 (ref 71–165)
GLOBULIN UR ELPH-MCNC: 3.1 GM/DL (ref 2.3–3.5)
GLUCOSE SERPL-MCNC: 90 MG/DL (ref 70–99)
HCT VFR BLD AUTO: 40.7 % (ref 34–46.6)
HETEROPH AB SER QL LA: NEGATIVE
HGB BLD-MCNC: 13.9 G/DL (ref 12–15.9)
LYMPHOCYTES # BLD AUTO: 1.61 10*3/MM3 (ref 0.7–3.1)
LYMPHOCYTES NFR BLD AUTO: 36.1 % (ref 19.6–45.3)
MCH RBC QN AUTO: 30.6 PG (ref 26.6–33)
MCHC RBC AUTO-ENTMCNC: 34.2 G/DL (ref 31.5–35.7)
MCV RBC AUTO: 89.6 FL (ref 79–97)
MONOCYTES # BLD AUTO: 0.32 10*3/MM3 (ref 0.1–0.9)
MONOCYTES NFR BLD AUTO: 7.2 % (ref 5–12)
NEUTROPHILS NFR BLD AUTO: 2.39 10*3/MM3 (ref 1.7–7)
NEUTROPHILS NFR BLD AUTO: 53.5 % (ref 42.7–76)
PLATELET # BLD AUTO: 186 10*3/MM3 (ref 140–450)
PMV BLD AUTO: 12.1 FL (ref 6–12)
POTASSIUM SERPL-SCNC: 3.9 MMOL/L (ref 3.4–5)
PROT SERPL-MCNC: 7.7 G/DL (ref 6.3–8.6)
RBC # BLD AUTO: 4.54 10*6/MM3 (ref 3.77–5.28)
RPR SER QL: NORMAL
SODIUM SERPL-SCNC: 138 MMOL/L (ref 137–145)
T4 FREE SERPL-MCNC: 1.17 NG/DL (ref 0.93–1.7)
TSH SERPL DL<=0.05 MIU/L-ACNC: 1.95 UIU/ML (ref 0.27–4.2)
VIT B12 BLD-MCNC: 411 PG/ML (ref 211–946)
WBC # BLD AUTO: 4.46 10*3/MM3 (ref 3.4–10.8)

## 2020-08-21 PROCEDURE — 86664 EPSTEIN-BARR NUCLEAR ANTIGEN: CPT | Performed by: NURSE PRACTITIONER

## 2020-08-21 PROCEDURE — 82607 VITAMIN B-12: CPT | Performed by: NURSE PRACTITIONER

## 2020-08-21 PROCEDURE — 86003 ALLG SPEC IGE CRUDE XTRC EA: CPT | Performed by: NURSE PRACTITIONER

## 2020-08-21 PROCEDURE — 87040 BLOOD CULTURE FOR BACTERIA: CPT | Performed by: NURSE PRACTITIONER

## 2020-08-21 PROCEDURE — 86644 CMV ANTIBODY: CPT | Performed by: NURSE PRACTITIONER

## 2020-08-21 PROCEDURE — 86645 CMV ANTIBODY IGM: CPT | Performed by: NURSE PRACTITIONER

## 2020-08-21 PROCEDURE — 86592 SYPHILIS TEST NON-TREP QUAL: CPT | Performed by: NURSE PRACTITIONER

## 2020-08-21 PROCEDURE — 84443 ASSAY THYROID STIM HORMONE: CPT | Performed by: NURSE PRACTITIONER

## 2020-08-21 PROCEDURE — 85651 RBC SED RATE NONAUTOMATED: CPT | Performed by: NURSE PRACTITIONER

## 2020-08-21 PROCEDURE — 86757 RICKETTSIA ANTIBODY: CPT | Performed by: NURSE PRACTITIONER

## 2020-08-21 PROCEDURE — 83516 IMMUNOASSAY NONANTIBODY: CPT | Performed by: NURSE PRACTITIONER

## 2020-08-21 PROCEDURE — 86618 LYME DISEASE ANTIBODY: CPT | Performed by: NURSE PRACTITIONER

## 2020-08-21 PROCEDURE — 85025 COMPLETE CBC W/AUTO DIFF WBC: CPT | Performed by: NURSE PRACTITIONER

## 2020-08-21 PROCEDURE — 84439 ASSAY OF FREE THYROXINE: CPT | Performed by: NURSE PRACTITIONER

## 2020-08-21 PROCEDURE — 86308 HETEROPHILE ANTIBODY SCREEN: CPT | Performed by: NURSE PRACTITIONER

## 2020-08-21 PROCEDURE — 86038 ANTINUCLEAR ANTIBODIES: CPT | Performed by: NURSE PRACTITIONER

## 2020-08-21 PROCEDURE — 86665 EPSTEIN-BARR CAPSID VCA: CPT | Performed by: NURSE PRACTITIONER

## 2020-08-21 PROCEDURE — 80053 COMPREHEN METABOLIC PANEL: CPT | Performed by: NURSE PRACTITIONER

## 2020-08-21 PROCEDURE — 82306 VITAMIN D 25 HYDROXY: CPT | Performed by: NURSE PRACTITIONER

## 2020-08-22 LAB
B BURGDOR IGG+IGM SER-ACNC: <0.91 ISR (ref 0–0.9)
CMV IGG SERPL IA-ACNC: <0.6 U/ML (ref 0–0.59)
CMV IGM SERPL IA-ACNC: <30 AU/ML (ref 0–29.9)
EBV NA IGG SER IA-ACNC: 31.6 U/ML (ref 0–17.9)
EBV VCA IGG SER-ACNC: 41.5 U/ML (ref 0–17.9)
EBV VCA IGM SER-ACNC: <36 U/ML (ref 0–35.9)
INTERPRETATION: ABNORMAL
PATHOLOGY REVIEW: YES

## 2020-08-24 LAB
ANA SER QL: NEGATIVE
CALIF WALNUT POLN IGE QN: <0.1 KU/L
CLAM IGE QN: <0.1 KU/L
CODFISH IGE QN: <0.1 KU/L
CONV CLASS DESCRIPTION: ABNORMAL
CORN IGE QN: <0.1 KU/L
COW MILK IGE QN: 0.28 KU/L
EGG WHITE IGE QN: <0.1 KU/L
LAB AP CASE REPORT: NORMAL
LAB AP CLINICAL INFORMATION: NORMAL
PATH REPORT.FINAL DX SPEC: NORMAL
PATH REPORT.GROSS SPEC: NORMAL
PEANUT IGE QN: <0.1 KU/L
SCALLOP IGE QN: <0.1 KU/L
SESAME SEED IGE: <0.1 KU/L
SHRIMP IGE: <0.1 KU/L
SOYBEAN IGE QN: <0.1 KU/L
WHEAT IGE QN: <0.1 KU/L

## 2020-08-25 LAB
R RICKETTSI IGG SER QL IA: NEGATIVE
R RICKETTSI IGM TITR SER: 0.81 INDEX (ref 0–0.89)

## 2020-08-26 LAB — BACTERIA SPEC AEROBE CULT: NORMAL

## 2020-08-27 LAB — ALPHA GAL IGE: 0.55 KU/L

## 2020-09-10 ENCOUNTER — LAB (OUTPATIENT)
Dept: LAB | Facility: OTHER | Age: 25
End: 2020-09-10

## 2020-09-10 DIAGNOSIS — E88.09 ALPHA GALACTOSIDASE DEFICIENCY: Primary | ICD-10-CM

## 2020-09-10 DIAGNOSIS — E88.09 ALPHA GALACTOSIDASE DEFICIENCY: ICD-10-CM

## 2020-09-10 PROCEDURE — 36415 COLL VENOUS BLD VENIPUNCTURE: CPT | Performed by: NURSE PRACTITIONER

## 2020-09-10 PROCEDURE — 86003 ALLG SPEC IGE CRUDE XTRC EA: CPT | Performed by: NURSE PRACTITIONER

## 2020-09-10 PROCEDURE — 86008 ALLG SPEC IGE RECOMB EA: CPT | Performed by: NURSE PRACTITIONER

## 2020-09-10 RX ORDER — EPINEPHRINE 0.3 MG/.3ML
0.3 INJECTION SUBCUTANEOUS ONCE
Qty: 2 EACH | Refills: 3 | Status: SHIPPED | OUTPATIENT
Start: 2020-09-10 | End: 2020-09-10

## 2020-09-11 DIAGNOSIS — E55.9 VITAMIN D DEFICIENCY: Primary | ICD-10-CM

## 2020-09-11 DIAGNOSIS — E53.8 VITAMIN B12 DEFICIENCY: ICD-10-CM

## 2020-09-11 RX ORDER — CYANOCOBALAMIN 1000 UG/ML
1000 INJECTION, SOLUTION INTRAMUSCULAR; SUBCUTANEOUS
Qty: 1 ML | Refills: 5 | Status: SHIPPED | OUTPATIENT
Start: 2020-09-11

## 2020-09-15 PROBLEM — R59.9 SHOTTY LYMPH NODES: Status: ACTIVE | Noted: 2020-09-15

## 2020-09-15 PROBLEM — R41.840 DIFFICULTY CONCENTRATING: Status: ACTIVE | Noted: 2020-09-15

## 2020-09-15 PROBLEM — N89.8 VAGINAL DISCHARGE: Status: ACTIVE | Noted: 2020-09-15

## 2020-09-15 PROBLEM — G90.A POTS (POSTURAL ORTHOSTATIC TACHYCARDIA SYNDROME): Status: ACTIVE | Noted: 2020-09-15

## 2020-09-15 PROBLEM — L29.9 ITCHY SKIN: Status: ACTIVE | Noted: 2020-09-15

## 2020-09-15 PROBLEM — R34 DECREASED URINE OUTPUT: Status: ACTIVE | Noted: 2020-09-15

## 2020-09-15 PROBLEM — R13.10 DYSPHAGIA: Status: ACTIVE | Noted: 2020-09-15

## 2020-09-15 PROBLEM — R06.89 BREATHING DIFFICULTY: Status: ACTIVE | Noted: 2020-09-15

## 2020-09-15 PROBLEM — R10.33 UMBILICAL PAIN: Status: ACTIVE | Noted: 2020-09-15

## 2020-09-15 PROBLEM — R10.2 PELVIC PAIN: Status: ACTIVE | Noted: 2020-09-15

## 2020-09-15 PROBLEM — R22.1 NECK SWELLING: Status: ACTIVE | Noted: 2020-09-15

## 2020-09-15 PROBLEM — R59.1 LYMPHADENOPATHY: Status: ACTIVE | Noted: 2020-09-15

## 2020-09-16 LAB
ALPHA GAL IGE: 0.49 KU/L
BEEF IGE QN: 0.38 KU/L
LAMB IGE QN: 0.19 KU/L
Lab: 1
Lab: ABNORMAL
Lab: ABNORMAL
PORK IGE: 0.27 KU/L

## 2020-09-29 ENCOUNTER — RESULTS ENCOUNTER (OUTPATIENT)
Dept: OBSTETRICS AND GYNECOLOGY | Facility: CLINIC | Age: 25
End: 2020-09-29

## 2020-09-29 ENCOUNTER — OFFICE VISIT (OUTPATIENT)
Dept: OBSTETRICS AND GYNECOLOGY | Facility: CLINIC | Age: 25
End: 2020-09-29

## 2020-09-29 VITALS — BODY MASS INDEX: 21.83 KG/M2 | SYSTOLIC BLOOD PRESSURE: 106 MMHG | HEIGHT: 64 IN | DIASTOLIC BLOOD PRESSURE: 64 MMHG

## 2020-09-29 DIAGNOSIS — N76.0 RECURRENT VAGINITIS: ICD-10-CM

## 2020-09-29 DIAGNOSIS — N89.8 VAGINAL DISCHARGE: ICD-10-CM

## 2020-09-29 DIAGNOSIS — R10.2 PELVIC PRESSURE IN FEMALE: ICD-10-CM

## 2020-09-29 DIAGNOSIS — N76.0 RECURRENT VAGINITIS: Primary | ICD-10-CM

## 2020-09-29 DIAGNOSIS — R10.9 ABDOMINAL CRAMPING: ICD-10-CM

## 2020-09-29 PROCEDURE — 81003 URINALYSIS AUTO W/O SCOPE: CPT | Performed by: NURSE PRACTITIONER

## 2020-09-29 PROCEDURE — 87086 URINE CULTURE/COLONY COUNT: CPT | Performed by: NURSE PRACTITIONER

## 2020-09-29 PROCEDURE — 99213 OFFICE O/P EST LOW 20 MIN: CPT | Performed by: NURSE PRACTITIONER

## 2020-09-29 NOTE — PROGRESS NOTES
"Subjective   Toshia Barraza is a 25 y.o. here for cramping, pelvic pressure, and frequent BV    Toshia is a  who has concerns that she has cramping, pelvic pressure, sometimes pain with sex, and that fact that she has frequent BV. Pt reports that she has been treated for BV at least 5 times this past year. Today she c/o of white vaginal discharge but denies fishy odor or irritation. She reports using a \"ph\" soap that seems to help; she has stopped using B&B scented soaps. She wears lace underwear. Denies a new sexual partner. Is stressed that she has frequent BV. One Swab was done last month and pt was treated for BV and AV. Reports the antibiotics she received helped her but now she is having the same white, vaginal discharge again. Pt is also wondering if she due for her Pap smear.     Gynecologic Exam  The patient's primary symptoms include vaginal discharge. The patient's pertinent negatives include no genital itching, genital lesions, genital odor, genital rash, missed menses, pelvic pain or vaginal bleeding. This is a recurrent problem. The current episode started in the past 7 days. The problem occurs intermittently. The problem has been unchanged. The patient is experiencing no pain. Pertinent negatives include no abdominal pain, chills, constipation, diarrhea, dysuria, fever, frequency, nausea, rash or sore throat. The vaginal discharge was white. There has been no bleeding. She has not been passing clots. She has not been passing tissue. Nothing aggravates the symptoms. Treatments tried: previous antibiotics previously.  She is sexually active. No, her partner does not have an STD. She uses condoms for contraception. Her menstrual history has been regular. Her past medical history is significant for ovarian cysts and vaginosis.       The following portions of the patient's history were reviewed and updated as appropriate: allergies, current medications, past family history, past medical history, " past social history, past surgical history and problem list.    Review of Systems   Constitutional: Negative for chills, fatigue, fever, unexpected weight gain and unexpected weight loss.   HENT: Negative for sneezing and sore throat.    Respiratory: Negative for shortness of breath.    Cardiovascular: Negative for chest pain and palpitations.   Gastrointestinal: Negative for abdominal pain, constipation, diarrhea and nausea.   Endocrine: Negative for cold intolerance and heat intolerance.   Genitourinary: Positive for vaginal discharge. Negative for amenorrhea, breast discharge, breast lump, breast pain, difficulty urinating, dysuria, frequency, menstrual problem, missed menses, pelvic pain, pelvic pressure, urinary incontinence, vaginal bleeding and vaginal pain.   Skin: Negative for rash.   Neurological: Negative for weakness and headache.   Psychiatric/Behavioral: Negative for sleep disturbance, depressed mood and stress.       Objective   Physical Exam  Vitals signs and nursing note reviewed. Exam conducted with a chaperone present.   Constitutional:       Appearance: She is well-developed.   HENT:      Head: Normocephalic.   Neck:      Musculoskeletal: Normal range of motion.   Pulmonary:      Effort: Pulmonary effort is normal.   Genitourinary:     Labia:         Right: No rash, tenderness, lesion or injury.         Left: No rash, tenderness, lesion or injury.       Vagina: Vaginal discharge present.      Cervix: Normal.      Rectum: No external hemorrhoid.      Comments: White, creamy vaginal discharge noted. One Swab collected.   Musculoskeletal: Normal range of motion.   Skin:     General: Skin is warm and dry.   Neurological:      Mental Status: She is alert and oriented to person, place, and time.   Psychiatric:         Behavior: Behavior normal.           Assessment/Plan   Diagnoses and all orders for this visit:    Recurrent vaginitis    Vaginal discharge  -     Urinalysis without microscopic (no  "culture) - Urine, Clean Catch  -     Urine Culture - Urine, Urine, Clean Catch    Pelvic pressure in female  -     Urinalysis without microscopic (no culture) - Urine, Clean Catch  -     Urine Culture - Urine, Urine, Clean Catch    Abdominal cramping  -     Urinalysis without microscopic (no culture) - Urine, Clean Catch  -     Urine Culture - Urine, Urine, Clean Catch        Counseled on various lifestyle changes to decrease the occurrence of vaginal infections such as no douching, wear cotton underwear and loose clothing at bedtime. Discussed that BV can be transient and may not require treatment unless patient is having significant symptoms such as strong, fishy odor, spotting, irritation, or increased vaginal discharge. Offered One Swab today; pt agreed. If BV or AV again; discussed antibiotics and use of boric acid suppositories as well due to recurrent vaginitis. Reviewed the purpose of pap smear as pt did not know it is to screen for cervical cancer and not to \"test for everything.\" Pt is made aware that her last pap was in 5/2018 and she will be due for a pap smear 5/2021. Pt v/u. Will defer repeating a pelvic ultrasound as she had a normal US in July 2020.  Will call patient for abnormal results of testing.      "

## 2020-09-30 LAB
BACTERIA SPEC AEROBE CULT: NORMAL
BILIRUB UR QL STRIP: NEGATIVE
CLARITY UR: ABNORMAL
COLOR UR: YELLOW
GLUCOSE UR STRIP-MCNC: NEGATIVE MG/DL
HGB UR QL STRIP.AUTO: NEGATIVE
KETONES UR QL STRIP: NEGATIVE
LEUKOCYTE ESTERASE UR QL STRIP.AUTO: NEGATIVE
NITRITE UR QL STRIP: NEGATIVE
PH UR STRIP.AUTO: 5.5 [PH] (ref 5–8)
PROT UR QL STRIP: NEGATIVE
SP GR UR STRIP: >=1.03 (ref 1–1.03)
UROBILINOGEN UR QL STRIP: ABNORMAL

## 2020-10-06 ENCOUNTER — TELEPHONE (OUTPATIENT)
Dept: OBSTETRICS AND GYNECOLOGY | Facility: CLINIC | Age: 25
End: 2020-10-06

## 2020-10-06 RX ORDER — AMPICILLIN 500 MG/1
500 CAPSULE ORAL 4 TIMES DAILY
Qty: 28 CAPSULE | Refills: 0 | Status: SHIPPED | OUTPATIENT
Start: 2020-10-06 | End: 2020-10-13

## 2020-10-06 RX ORDER — METRONIDAZOLE 500 MG/1
500 TABLET ORAL 2 TIMES DAILY
Qty: 14 TABLET | Refills: 0 | Status: SHIPPED | OUTPATIENT
Start: 2020-10-06 | End: 2020-10-13

## 2020-10-06 RX ORDER — FLUCONAZOLE 150 MG/1
TABLET ORAL
Qty: 2 TABLET | Refills: 0 | OUTPATIENT
Start: 2020-10-06 | End: 2020-10-26

## 2020-10-06 NOTE — TELEPHONE ENCOUNTER
Spoke to pt regarding OneSwab result. Positive for BV and AV-Entercoccus faecalis. Pt has recurrent vaginal infections.    Rx for ampicillin, flagyl given. Diflucan rx for yeast prophylaxis. Discussed boric acid 600mg intravginally for 21 days as well-pt will buy OTC.

## 2020-10-12 ENCOUNTER — TELEPHONE (OUTPATIENT)
Dept: FAMILY MEDICINE CLINIC | Facility: CLINIC | Age: 25
End: 2020-10-12

## 2020-10-12 NOTE — TELEPHONE ENCOUNTER
Priti Jarrett, MA   9/11/2020 11:53 AM      Pt said to send in the medicine pt already knows how to do the injections at home as well as her     Ciera ChinchillaLATRICE   9/10/2020  6:28 PM      She can do vitamin b12 injections, can you set it up for her to come in and you show her to do mtnly and then do at home? Bring the  with so we can show him? Also does she want me to send her in the vit d/calcium supplement to take with a meal, I dont' mind. Thanks. Let me know then ill send everything in.    Priti Jarrett, MA   9/10/2020  1:55 PM      Pt aware of results    Priti Jarrett MA   9/10/2020  1:54 PM      Pt aware of results and yo  the epi pen at her pharmacy she said to send in the referrals and she said she has not started anything for vit d or b she wants to kow if she can do the vitb 12 injections    Amor LATRICE Thacker   9/10/2020  1:45 PM      She also needs an EpiPen to carry with her at all times to make sure that if she has an anaphylactic reaction she has it on hand, these are more common with alpha gal allergy so I just wanted to be there just in case.  I will send this into the pharmacy.    Ciera ChinchillaLATRICE   9/10/2020  1:44 PM      Going to go over labs that I have not discussed with her and double check on some other labs:     Did she start on a multivitamin for her lower vitamin D and B12?  I did is much research as I could get my hands on but could not find a correlation with the history of Diana-Barr virus and it not being actively a problem so I do not think that this is necessarily what would be causing her fatigue and symptoms but since that showing that she is having a viral problem with her peripheral smear, we talked ID and they want to  review her case before seeing her so I can put in that referral and send them info if she wants, let me know.  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  I also think that we need to have her avoid dairy as much as she can because when you have alpha gal and you also have food allergies present with it which we showed on her food allergy panel which does not test for the meats related to alpha gal, it could be a milk allergy secondary to the alpha gal so it would be good to avoid all dairy pork beef and Lamb until we get the labs back.  Would recommend rechecking this in 3 months.  I would also recommend her seeing ENT/allergist at [facility]ergist in I want to stay at [facility] or [facility], [place] but is here [place]?  Let me know if she wants to do this.  She can also try diet and such and see how she does and go from there.  I think that the alpha gal might explain why she is having itchiness in her throat occasionally with certain foods.  May also describe some of the fatigue.     So I would recommend that the beginning of December she gets her EBV titers for the Diana-Barr virus, food and alpha gal panel, vitamin D and B12 as well as peripheral blood smear rechecked.  I would recommend that she comes in as soon as she can to do an alpha gal panel.  She does not to be fasting for this.  Let me know she is okay with that.  Let me know if she is on them multivitamin for B12 and vitamin D.  Let me know she was those 2 referrals.  Then we will get the MRI and touch base again.

## 2020-10-12 NOTE — TELEPHONE ENCOUNTER
----- Message from Priti Jarrett MA sent at 9/11/2020 11:53 AM CDT -----  Pt said to send in the medicine pt already knows how to do the injections at home as well as her

## 2020-10-26 PROCEDURE — U0002 COVID-19 LAB TEST NON-CDC: HCPCS | Performed by: PHYSICIAN ASSISTANT

## 2020-10-27 ENCOUNTER — LAB (OUTPATIENT)
Dept: LAB | Facility: OTHER | Age: 25
End: 2020-10-27

## 2021-01-12 ENCOUNTER — OFFICE VISIT (OUTPATIENT)
Dept: FAMILY MEDICINE CLINIC | Facility: CLINIC | Age: 26
End: 2021-01-12

## 2021-01-12 VITALS
OXYGEN SATURATION: 98 % | RESPIRATION RATE: 14 BRPM | DIASTOLIC BLOOD PRESSURE: 64 MMHG | SYSTOLIC BLOOD PRESSURE: 106 MMHG | TEMPERATURE: 98.3 F | HEART RATE: 91 BPM | HEIGHT: 64 IN | WEIGHT: 125 LBS | BODY MASS INDEX: 21.34 KG/M2

## 2021-01-12 DIAGNOSIS — M54.2 NECK PAIN ON LEFT SIDE: Primary | ICD-10-CM

## 2021-01-12 DIAGNOSIS — M54.42 ACUTE LEFT-SIDED LOW BACK PAIN WITH LEFT-SIDED SCIATICA: ICD-10-CM

## 2021-01-12 DIAGNOSIS — M53.3 SACRAL PAIN: ICD-10-CM

## 2021-01-12 DIAGNOSIS — M54.2 NECK PAIN ON LEFT SIDE: ICD-10-CM

## 2021-01-12 DIAGNOSIS — R20.2 NUMBNESS AND TINGLING IN LEFT ARM: ICD-10-CM

## 2021-01-12 DIAGNOSIS — M53.3 SACRAL PAIN: Primary | ICD-10-CM

## 2021-01-12 DIAGNOSIS — M79.605 LEFT LEG PAIN: ICD-10-CM

## 2021-01-12 DIAGNOSIS — R20.0 NUMBNESS AND TINGLING IN LEFT ARM: ICD-10-CM

## 2021-01-12 PROCEDURE — 99214 OFFICE O/P EST MOD 30 MIN: CPT | Performed by: NURSE PRACTITIONER

## 2021-01-12 RX ORDER — BACLOFEN 10 MG/1
10 TABLET ORAL NIGHTLY PRN
Qty: 30 TABLET | Refills: 0 | Status: SHIPPED | OUTPATIENT
Start: 2021-01-12 | End: 2021-08-18 | Stop reason: SDUPTHER

## 2021-01-12 RX ORDER — PREDNISONE 10 MG/1
TABLET ORAL
Qty: 18 TABLET | Refills: 0 | Status: SHIPPED | OUTPATIENT
Start: 2021-01-12 | End: 2021-02-08

## 2021-01-12 RX ORDER — ETODOLAC 400 MG/1
400 TABLET, EXTENDED RELEASE ORAL DAILY
Qty: 30 TABLET | Refills: 0 | Status: SHIPPED | OUTPATIENT
Start: 2021-01-12 | End: 2021-11-08

## 2021-01-12 RX ORDER — ASPIRIN 81 MG/1
81 TABLET ORAL DAILY
Qty: 30 TABLET | Refills: 5 | Status: SHIPPED | OUTPATIENT
Start: 2021-01-12

## 2021-02-03 DIAGNOSIS — R26.2 AMBULATORY DYSFUNCTION: ICD-10-CM

## 2021-02-03 DIAGNOSIS — M53.3 SACRAL PAIN: Primary | ICD-10-CM

## 2021-02-03 DIAGNOSIS — M54.42 ACUTE LEFT-SIDED LOW BACK PAIN WITH LEFT-SIDED SCIATICA: ICD-10-CM

## 2021-02-03 DIAGNOSIS — M79.605 LEFT LEG PAIN: ICD-10-CM

## 2021-02-05 RX ORDER — OMEPRAZOLE 40 MG/1
40 CAPSULE, DELAYED RELEASE ORAL 2 TIMES DAILY
Qty: 60 CAPSULE | Refills: 5 | Status: SHIPPED | OUTPATIENT
Start: 2021-02-05 | End: 2021-03-04

## 2021-02-08 ENCOUNTER — OFFICE VISIT (OUTPATIENT)
Dept: GASTROENTEROLOGY | Facility: CLINIC | Age: 26
End: 2021-02-08

## 2021-02-08 VITALS
SYSTOLIC BLOOD PRESSURE: 105 MMHG | HEART RATE: 88 BPM | WEIGHT: 126.8 LBS | BODY MASS INDEX: 21.65 KG/M2 | HEIGHT: 64 IN | DIASTOLIC BLOOD PRESSURE: 67 MMHG

## 2021-02-08 DIAGNOSIS — K21.00 GASTROESOPHAGEAL REFLUX DISEASE WITH ESOPHAGITIS WITHOUT HEMORRHAGE: ICD-10-CM

## 2021-02-08 DIAGNOSIS — R10.13 EPIGASTRIC PAIN: Primary | ICD-10-CM

## 2021-02-08 PROCEDURE — 99214 OFFICE O/P EST MOD 30 MIN: CPT | Performed by: NURSE PRACTITIONER

## 2021-02-08 RX ORDER — DEXTROSE AND SODIUM CHLORIDE 5; .45 G/100ML; G/100ML
30 INJECTION, SOLUTION INTRAVENOUS CONTINUOUS PRN
Status: CANCELLED | OUTPATIENT
Start: 2021-03-01

## 2021-02-08 NOTE — PROGRESS NOTES
Chief Complaint   Patient presents with   • Nausea   • Heartburn       Subjective    Toshia Barraza is a 26 y.o. female. she is here today for follow-up.  26-year-old female presents for follow-up regarding nausea and reflux.  She was doing better for some time then over the last few weeks she woke up with excessive sputum production and was coughing up black phlegm /tarrhairy-looking material.  She is not a smoker and reports she is not around any secondhand smoke.   states she had Covid back in October and is unsure if it is a long-term side effect of this.  Reports reflux has been controlled with omeprazole otherwise.  She underwent EGD June 2020 which noted stricture, esophagitis and gastritis..    Nausea  This is a chronic problem. The problem occurs intermittently. The problem has been gradually worsening. Associated symptoms include abdominal pain and fatigue. Pertinent negatives include no anorexia, arthralgias, change in bowel habit, chest pain, chills, congestion, coughing, diaphoresis, fever, headaches, joint swelling, myalgias, nausea, neck pain, numbness, rash, sore throat, swollen glands, urinary symptoms, vertigo, visual change, vomiting or weakness. Associated symptoms comments: Intermittent episodes of diaphoresis with sudden severe left upper quadrant-epigastric  sharp squeezing pain .   Heartburn  She complains of abdominal pain. She reports no chest pain, no coughing, no nausea or no sore throat. Associated symptoms include fatigue.     Plan; schedule patient for EGD due to worsening epigastric pain and nausea        The following portions of the patient's history were reviewed and updated as appropriate:   Past Medical History:   Diagnosis Date   • Distorted body image    • Esophagitis 4/28/2019     Past Surgical History:   Procedure Laterality Date   • BREAST AUGMENTATION     • BREAST BIOPSY     • COLONOSCOPY N/A 4/10/2019    Procedure: COLONOSCOPY;  Surgeon: Aidan Alvarado MD;   Location: [facility] ENDOSCOPY;  Service: Gastroenterology   • ENDOSCOPY N/A 4/10/2019    Procedure: ESOPHAGOGASTRODUODENOSCOPY Wed/Fri;  Surgeon: Aidan Alvarado MD;  Location: [facility] ENDOSCOPY;  Service: Gastroenterology   • ENDOSCOPY N/A 2020    Procedure: ESOPHAGOGASTRODUODENOSCOPY possible dilation;  Surgeon: Aidan Alvarado MD;  Location: [facility] ENDOSCOPY;  Service: Gastroenterology;  Laterality: N/A;     Family History   Problem Relation Age of Onset   • Other Mother         hematologic disorder   • Heart disease Mother         ischemic heart disease   • Heart attack Mother    • Leukemia Mother    • Ovarian cancer Mother         questionable   • Heart disease Father    • Hypertension Father      OB History        2    Para   2    Term   1       1    AB        Living   2       SAB        TAB        Ectopic        Molar        Multiple        Live Births   2              Prior to Admission medications    Medication Sig Start Date End Date Taking? Authorizing Provider   aspirin (aspirin) 81 MG EC tablet Take 1 tablet by mouth Daily. 21  Yes Ciera Chinchilla APRN   baclofen (LIORESAL) 10 MG tablet Take 1 tablet by mouth At Night As Needed for Muscle Spasms (muscular cramps/tension). For neck pain. 21  Yes Ciera Chinchilla APRN   butalbital-acetaminophen  MG tablet tablet Take 1 tablet by mouth Every 6 (Six) Hours As Needed (headache). 19  Yes Ciera Chinchilla APRN   Calcium Carb-Cholecalciferol (CALCIUM-VITAMIN D) 500-200 MG-UNIT per tablet Take 1 tablet by mouth 2 (Two) Times a Day With Meals. 20  Yes Ciera Chinchilla APRN   cyanocobalamin 1000 MCG/ML injection Inject 1 mL into the appropriate muscle as directed by prescriber Every 28 (Twenty-Eight) Days. 20  Yes Ciera Chinchilla APRN   escitalopram (LEXAPRO) 10 MG tablet Take 1 tablet by mouth Daily. 19  Yes Ciera Chinchilla APRN   etodolac XL (LODINE XL) 400 MG 24 hr tablet Take 1 tablet by  "mouth Daily. Antiinflammatory for lower back pain 1/12/21  Yes Ciera Chinchilla APRN   gabapentin (NEURONTIN) 100 MG capsule Take 1-3 capsules up to three times per day for nerve pain as needed. 5/22/20  Yes Ciera Chinchilla APRN   omeprazole (priLOSEC) 40 MG capsule Take 1 capsule by mouth 2 (two) times a day. 2/5/21  Yes Ciera Chinchilla APRN   ondansetron ODT (ZOFRAN-ODT) 4 MG disintegrating tablet Take 1 tablet by mouth Every 8 (Eight) Hours As Needed for Nausea or Vomiting for up to 12 doses. 11/15/19  Yes Kishore Barrera MD   Syringe/Needle, Disp, 25G X 5/8\" 3 ML misc 1 syringe Every 30 (Thirty) Days. 9/11/20  Yes Ciera Chinchilla APRN   sucralfate (CARAFATE) 1 GM/10ML suspension Take 10 mL by mouth 4 (Four) Times a Day With Meals & at Bedtime. 4/17/19   Aaliyah Arrieta APRN   azithromycin (ZITHROMAX) 250 MG tablet Take 2 tablets the first day, then 1 tablet daily for 4 days. 10/26/20 2/8/21  Armida Bales PA-C   predniSONE (DELTASONE) 10 MG tablet Take [id number]. 1/12/21 2/8/21  Ciera Chinchilla APRN     Allergies   Allergen Reactions   • Beef-Derived Products Other (See Comments)     Alpha-gal   • Pork-Derived Products Other (See Comments)     Alpha-gal     Social History     Socioeconomic History   • Marital status: Single     Spouse name: Not on file   • Number of children: Not on file   • Years of education: Not on file   • Highest education level: Not on file   Tobacco Use   • Smoking status: Never Smoker   • Smokeless tobacco: Never Used   Substance and Sexual Activity   • Alcohol use: No     Frequency: Never   • Drug use: No   • Sexual activity: Defer       Review of Systems  Review of Systems   Constitutional: Positive for fatigue. Negative for activity change, appetite change, chills, diaphoresis, fever and unexpected weight change.   HENT: Negative for congestion, sore throat and trouble swallowing.    Respiratory: Negative for cough and shortness of " "breath.    Cardiovascular: Negative for chest pain.   Gastrointestinal: Positive for abdominal pain. Negative for abdominal distention, anal bleeding, anorexia, blood in stool, change in bowel habit, constipation, diarrhea, nausea, rectal pain and vomiting.   Musculoskeletal: Negative for arthralgias, joint swelling, myalgias and neck pain.   Skin: Negative for pallor and rash.   Neurological: Negative for vertigo, weakness, light-headedness, numbness and headaches.        /67 (BP Location: Right arm)   Pulse 88   Ht 162.6 cm (64\")   Wt 57.5 kg (126 lb 12.8 oz)   BMI 21.77 kg/m²     Objective    Physical Exam  Constitutional:       General: She is not in acute distress.     Appearance: Normal appearance. She is well-developed.   HENT:      Head: Normocephalic and atraumatic.   Neck:      Musculoskeletal: Normal range of motion and neck supple.      Thyroid: No thyromegaly.   Cardiovascular:      Rate and Rhythm: Normal rate and regular rhythm.      Heart sounds: Normal heart sounds.   Pulmonary:      Effort: Pulmonary effort is normal.      Breath sounds: Normal breath sounds. No wheezing, rhonchi or rales.   Abdominal:      General: Bowel sounds are normal. There is no distension.      Palpations: Abdomen is soft. Abdomen is not rigid.      Tenderness: There is abdominal tenderness in the right upper quadrant and epigastric area. There is no guarding.      Hernia: No hernia is present.   Lymphadenopathy:      Cervical: No cervical adenopathy.   Skin:     General: Skin is warm and dry.      Coloration: Skin is not pale.      Findings: No rash.   Neurological:      Mental Status: She is alert and oriented to person, place, and time.   Psychiatric:         Speech: Speech normal.         Behavior: Behavior is cooperative.       Admission on 10/26/2020, Discharged on 10/26/2020   Component Date Value Ref Range Status   • COVID19 10/26/2020 Detected* Not Detected - Ref. Range Final     Assessment/Plan      1. " Epigastric pain    2. Gastroesophageal reflux disease with esophagitis without hemorrhage    .       Orders placed during this encounter include:  Orders Placed This Encounter   Procedures   • NM Hepatobiliary Without CCK     Standing Status:   Future     Standing Expiration Date:   2/8/2022     Order Specific Question:   Patient Pregnant     Answer:   No   • Follow Anesthesia Guidelines / Standing Orders     Standing Status:   Future   • Obtain Informed Consent     Standing Status:   Future     Order Specific Question:   Informed Consent Given For     Answer:   ESOPHAGOGASTRODUODENOSCOPY possible dilation       ESOPHAGOGASTRODUODENOSCOPY possible dilation (N/A)    Review and/or summary of lab tests, radiology, procedures, medications. Review and summary of old records and obtaining of history. The risks and benefits of my recommendations, as well as other treatment options were discussed with the patient today. Questions were answered.    No orders of the defined types were placed in this encounter.      Follow-up: Return in about 4 weeks (around 3/8/2021) for Recheck, After test.          This document has been electronically signed by LATRICE Pearce on February 8, 2021 14:43 CST           I spent 32 minutes caring for Toshia on this date of service. This time includes time spent by me in the following activities:preparing for the visit, reviewing tests, obtaining and/or reviewing a separately obtained history, performing a medically appropriate examination and/or evaluation , counseling and educating the patient/family/caregiver, ordering medications, tests, or procedures, referring and communicating with other health care professionals , documenting information in the medical record and care coordination    Results for orders placed or performed during the hospital encounter of 10/26/20   COVID-19,[facility], NP SWAB IN TRANSPORT MEDIA 48-72 HR TAT - Swab, Nasopharynx    Specimen: Nasopharynx; Swab    Result Value Ref Range    Reference Lab Report      COVID19 Detected (C) Not Detected - Ref. Range   Results for orders placed or performed in visit on 09/29/20   Urinalysis without microscopic (no culture) - Urine, Clean Catch    Specimen: Urine, Clean Catch   Result Value Ref Range    Color, UA Yellow Yellow, Straw    Appearance, UA Turbid (A) Clear    pH, UA 5.5 5.0 - 8.0    Specific Gravity, UA >=1.030 1.005 - 1.030    Glucose, UA Negative Negative    Ketones, UA Negative Negative    Bilirubin, UA Negative Negative    Blood, UA Negative Negative    Protein, UA Negative Negative    Leuk Esterase, UA Negative Negative    Nitrite, UA Negative Negative    Urobilinogen, UA 1.0 E.U./dL 0.2 - 1.0 E.U./dL   Urine Culture - Urine, Urine, Clean Catch    Specimen: Urine, Clean Catch   Result Value Ref Range    Urine Culture <10,000 CFU/mL Mixed Manasa Isolated    Results for orders placed or performed in visit on 09/10/20   Alpha - Gal Panel    Specimen: Blood   Result Value Ref Range    Beef 0.38 (H) <0.35 kU/L    Class Description 1     Vale 0.19 <0.35 kU/L    Class Interpretation 0/1     Pork 0.27 <0.35 kU/L    Class Interpretation 0/1     Alpha Gal IgE 0.49 (H) <0.10 kU/L   Results for orders placed or performed in visit on 08/21/20   Alpha Gal IgE    Specimen: Blood   Result Value Ref Range    Alpha Gal IgE 0.55 (H) <0.10 kU/L   NADIRA Direct Reflex to 11 Biomarker    Specimen: Blood   Result Value Ref Range    NADIRA Direct Negative Negative   EBV Antibody Profile    Specimen: Blood   Result Value Ref Range    EBV VCA IgM <36.0 0.0 - 35.9 U/mL    EBV VCA IgG 41.5 (H) 0.0 - 17.9 U/mL    EBV Nuclear Antigen Ab, IgG 31.6 (H) 0.0 - 17.9 U/mL    Interpretation Comment    Pathology Consultation    Specimen: Arm; Blood   Result Value Ref Range    Final Diagnosis       1. Peripheral Blood Smear:        A. Adequate white cells, red cells and platelets.       Comment: Relatively normal distribution of white cells with occasional  vacuolated macrophages and rare atypical lymphocytes noted.  No blasts are seen.  Morphologic features suggest possible viral infection.  Platelets appear adequate.  A few giant platelets are noted.  Clinical correlation is required.    renet/dorothea         Clinical Information       Recent CBC data includes WBC = 4.46, RBC = 4.54, HGB = 13.9, HCT = 40.7, MCV = 89.6, MCH = 30.6, MCHC = 34.2, RDW = 12.1, MPV = 12.1, platelets = 186K.      Gross Description       Two routinely stained peripheral blood smears.   [facility]       Case Report       Surgical Pathology Report                         Case: [id number]                                  Authorizing Provider:  Ciera Chinchilla APRN   Collected:           08/21/2020 07:35 AM          Ordering Location:     [facility]     Received:            08/24/2020 08:55 AM                                 [facility]                                                               Pathologist:           Parminder Sena MD                                                         Specimen:    Arm, Perpheral Blood Smear                                                                Food Allergy Profile    Specimen: Blood   Result Value Ref Range    Class Description Comment     Egg White <0.10 Class 0 kU/L    Peanut <0.10 Class 0 kU/L    Soybean <0.10 Class 0 kU/L    Milk, Cow's 0.28 (A) Class 0/I kU/L    Clams <0.10 Class 0 kU/L    Shrimp <0.10 Class 0 kU/L    [place] <0.10 Class 0 kU/L    CodFish <0.10 Class 0 kU/L    Scallop <0.10 Class 0 kU/L    Wheat <0.10 Class 0 kU/L    Corn <0.10 Class 0 kU/L    Sesame Seed <0.10 Class 0 kU/L   [facility] (IgG / M)    Specimen: Blood   Result Value Ref Range    RMSF IgG Negative Negative    RMSF IgM 0.81 0.00 - 0.89 index   Lyme, Total Antibody Test / Reflex    Specimen: Blood   Result Value Ref Range    Lyme Ab IgG/IgM <0.91 0.00 - 0.90 ISR   CBC Auto Differential    Specimen: Blood   Result Value Ref Range    WBC 4.46  3.40 - 10.80 10*3/mm3    RBC 4.54 3.77 - 5.28 10*6/mm3    Hemoglobin 13.9 12.0 - 15.9 g/dL    Hematocrit 40.7 34.0 - 46.6 %    MCV 89.6 79.0 - 97.0 fL    MCH 30.6 26.6 - 33.0 pg    MCHC 34.2 31.5 - 35.7 g/dL    RDW 12.1 (L) 12.3 - 15.4 %    RDW-SD 38.8 37.0 - 54.0 fl    MPV 12.1 (H) 6.0 - 12.0 fL    Platelets 186 140 - 450 10*3/mm3    Neutrophil % 53.5 42.7 - 76.0 %    Lymphocyte % 36.1 19.6 - 45.3 %    Monocyte % 7.2 5.0 - 12.0 %    Eosinophil % 2.5 0.3 - 6.2 %    Basophil % 0.7 0.0 - 1.5 %    Neutrophils, Absolute 2.39 1.70 - 7.00 10*3/mm3    Lymphocytes, Absolute 1.61 0.70 - 3.10 10*3/mm3    Monocytes, Absolute 0.32 0.10 - 0.90 10*3/mm3    Eosinophils, Absolute 0.11 0.00 - 0.40 10*3/mm3    Basophils, Absolute 0.03 0.00 - 0.20 10*3/mm3   Cytomegalovirus Antibody, IgM    Specimen: Blood   Result Value Ref Range    CMV IgM <30.0 0.0 - 29.9 AU/mL   Peripheral Blood Smear    Specimen: Blood   Result Value Ref Range    Pathology Review Yes    Vitamin D 25 hydroxy    Specimen: Blood   Result Value Ref Range    25 Hydroxy, Vitamin D 42.1 30.0 - 100.0 ng/ml   RPR    Specimen: Blood   Result Value Ref Range    RPR Non-Reactive Non-Reactive   Mononucleosis Screen    Specimen: Blood   Result Value Ref Range    Monospot Negative Negative   Cytomegalovirus Antibody, IgG    Specimen: Blood   Result Value Ref Range    CMV IgG <0.60 0.00 - 0.59 U/mL   Blood Culture - Blood, Arm, Left    Specimen: Arm, Left; Blood   Result Value Ref Range    Blood Culture No growth at 5 days    Sedimentation Rate    Specimen: Blood   Result Value Ref Range    Sed Rate 1 0 - 20 mm/hr   TSH    Specimen: Blood   Result Value Ref Range    TSH 1.950 0.270 - 4.200 uIU/mL   T4, free    Specimen: Blood   Result Value Ref Range    Free T4 1.17 0.93 - 1.70 ng/dL   Vitamin B12    Specimen: Blood   Result Value Ref Range    Vitamin B-12 411 211 - 946 pg/mL   Comprehensive metabolic panel    Specimen: Blood   Result Value Ref Range    Glucose 90 70 - 99 mg/dL     BUN 11 7 - 23 mg/dL    Creatinine 0.67 0.52 - 1.04 mg/dL    Sodium 138 137 - 145 mmol/L    Potassium 3.9 3.4 - 5.0 mmol/L    Chloride 101 101 - 112 mmol/L    CO2 29.0 22.0 - 30.0 mmol/L    Calcium 9.3 8.4 - 10.2 mg/dL    Total Protein 7.7 6.3 - 8.6 g/dL    Albumin 4.60 3.50 - 5.00 g/dL    ALT (SGPT) 21 <=35 U/L    AST (SGOT) 27 14 - 36 U/L    Alkaline Phosphatase 62 38 - 126 U/L    Total Bilirubin 0.7 0.2 - 1.3 mg/dL    eGFR Non  Amer 109 71 - 165 mL/min/1.73    Globulin 3.1 2.3 - 3.5 gm/dL    A/G Ratio 1.5 1.1 - 1.8 g/dL    BUN/Creatinine Ratio 16.4 7.0 - 25.0    Anion Gap 8.0 5.0 - 15.0 mmol/L   Results for orders placed or performed in visit on 07/23/20   Chlamydia trachomatis, Neisseria gonorrhoeae, Trichomonas vaginalis, PCR - Urine, Urine, Clean Catch    Specimen: Urine, Clean Catch   Result Value Ref Range    Chlamydia DNA by PCR Negative Negative    Neisseria gonorrhoeae by PCR Negative Negative    Trichomonas vaginalis PCR Negative      *Note: Due to a large number of results and/or encounters for the requested time period, some results have not been displayed. A complete set of results can be found in Results Review.

## 2021-02-08 NOTE — PATIENT INSTRUCTIONS
Upper Endoscopy, Adult  Upper endoscopy is a procedure to look inside the upper GI (gastrointestinal) tract. The upper GI tract is made up of:  · The part of the body that moves food from your mouth to your stomach (esophagus).  · The stomach.  · The first part of your small intestine (duodenum).  This procedure is also called esophagogastroduodenoscopy (EGD) or gastroscopy. In this procedure, your health care provider passes a thin, flexible tube (endoscope) through your mouth and down your esophagus into your stomach. A small camera is attached to the end of the tube. Images from the camera appear on a monitor in the exam room. During this procedure, your health care provider may also remove a small piece of tissue to be sent to a lab and examined under a microscope (biopsy).  Your health care provider may do an upper endoscopy to diagnose cancers of the upper GI tract. You may also have this procedure to find the cause of other conditions, such as:  · Stomach pain.  · Heartburn.  · Pain or problems when swallowing.  · Nausea and vomiting.  · Stomach bleeding.  · Stomach ulcers.  Tell a health care provider about:  · Any allergies you have.  · All medicines you are taking, including vitamins, herbs, eye drops, creams, and over-the-counter medicines.  · Any problems you or family members have had with anesthetic medicines.  · Any blood disorders you have.  · Any surgeries you have had.  · Any medical conditions you have.  · Whether you are pregnant or may be pregnant.  What are the risks?  Generally, this is a safe procedure. However, problems may occur, including:  · Infection.  · Bleeding.  · Allergic reactions to medicines.  · A tear or hole (perforation) in the esophagus, stomach, or duodenum.  What happens before the procedure?  Staying hydrated  Follow instructions from your health care provider about hydration, which may include:  · Up to 2 hours before the procedure - you may continue to drink clear  liquids, such as water, clear fruit juice, black coffee, and plain tea.    Eating and drinking restrictions  Follow instructions from your health care provider about eating and drinking, which may include:  · 8 hours before the procedure - stop eating heavy meals or foods, such as meat, fried foods, or fatty foods.  · 6 hours before the procedure - stop eating light meals or foods, such as toast or cereal.  · 6 hours before the procedure - stop drinking milk or drinks that contain milk.  · 2 hours before the procedure - stop drinking clear liquids.  Medicines  Ask your health care provider about:  · Changing or stopping your regular medicines. This is especially important if you are taking diabetes medicines or blood thinners.  · Taking medicines such as aspirin and ibuprofen. These medicines can thin your blood. Do not take these medicines unless your health care provider tells you to take them.  · Taking over-the-counter medicines, vitamins, herbs, and supplements.  General instructions  · Plan to have someone take you home from the hospital or clinic.  · If you will be going home right after the procedure, plan to have someone with you for 24 hours.  · Ask your health care provider what steps will be taken to help prevent infection.  What happens during the procedure?    · An IV will be inserted into one of your veins.  · You may be given one or more of the following:  ? A medicine to help you relax (sedative).  ? A medicine to numb the throat (local anesthetic).  · You will lie on your left side on an exam table.  · Your health care provider will pass the endoscope through your mouth and down your esophagus.  · Your health care provider will use the scope to check the inside of your esophagus, stomach, and duodenum. Biopsies may be taken.  · The endoscope will be removed.  The procedure may vary among health care providers and hospitals.  What happens after the procedure?  · Your blood pressure, heart rate,  breathing rate, and blood oxygen level will be monitored until you leave the hospital or clinic.  · Do not drive for 24 hours if you were given a sedative during your procedure.  · When your throat is no longer numb, you may be given some fluids to drink.  · It is up to you to get the results of your procedure. Ask your health care provider, or the department that is doing the procedure, when your results will be ready.  Summary  · Upper endoscopy is a procedure to look inside the upper GI tract.  · During the procedure, an IV will be inserted into one of your veins. You may be given a medicine to help you relax.  · A medicine will be used to numb your throat.  · The endoscope will be passed through your mouth and down your esophagus.  This information is not intended to replace advice given to you by your health care provider. Make sure you discuss any questions you have with your health care provider.  Document Revised: 06/12/2019 Document Reviewed: 05/20/2019  [facility] [employer] Patient Education © 2020 [employer]

## 2021-02-10 ENCOUNTER — HOSPITAL ENCOUNTER (OUTPATIENT)
Dept: NUCLEAR MEDICINE | Facility: HOSPITAL | Age: 26
Discharge: HOME | End: 2021-02-10

## 2021-02-10 DIAGNOSIS — R10.13 EPIGASTRIC PAIN: ICD-10-CM

## 2021-02-10 PROCEDURE — 78226 HEPATOBILIARY SYSTEM IMAGING: CPT

## 2021-02-10 PROCEDURE — A9537 TC99M MEBROFENIN: HCPCS | Performed by: NURSE PRACTITIONER

## 2021-02-10 PROCEDURE — 0 TECHNETIUM TC 99M MEBROFENIN KIT: Performed by: NURSE PRACTITIONER

## 2021-02-10 RX ORDER — KIT FOR THE PREPARATION OF TECHNETIUM TC 99M MEBROFENIN 45 MG/10ML
1 INJECTION, POWDER, LYOPHILIZED, FOR SOLUTION INTRAVENOUS
Status: COMPLETED | OUTPATIENT
Start: 2021-02-10 | End: 2021-02-10

## 2021-02-10 RX ADMIN — MEBROFENIN 1 DOSE: 45 INJECTION, POWDER, LYOPHILIZED, FOR SOLUTION INTRAVENOUS at 09:56

## 2021-02-12 ENCOUNTER — TELEPHONE (OUTPATIENT)
Dept: GASTROENTEROLOGY | Facility: CLINIC | Age: 26
End: 2021-02-12

## 2021-02-12 NOTE — TELEPHONE ENCOUNTER
Relayed results to patient who voiced understanding.       ----- Message from LATRICE Scott sent at 2/10/2021  2:25 PM CST -----  Please call patient with results

## 2021-02-16 ENCOUNTER — APPOINTMENT (OUTPATIENT)
Dept: MRI IMAGING | Facility: HOSPITAL | Age: 26
End: 2021-02-16

## 2021-02-23 ENCOUNTER — HOSPITAL ENCOUNTER (OUTPATIENT)
Dept: MRI IMAGING | Facility: HOSPITAL | Age: 26
Discharge: HOME | End: 2021-02-23
Admitting: NURSE PRACTITIONER

## 2021-02-23 DIAGNOSIS — M79.605 LEFT LEG PAIN: ICD-10-CM

## 2021-02-23 DIAGNOSIS — M53.3 SACRAL PAIN: ICD-10-CM

## 2021-02-23 DIAGNOSIS — R26.2 AMBULATORY DYSFUNCTION: ICD-10-CM

## 2021-02-23 DIAGNOSIS — M54.42 ACUTE LEFT-SIDED LOW BACK PAIN WITH LEFT-SIDED SCIATICA: ICD-10-CM

## 2021-02-23 PROCEDURE — 72148 MRI LUMBAR SPINE W/O DYE: CPT

## 2021-02-26 ENCOUNTER — LAB (OUTPATIENT)
Dept: LAB | Facility: HOSPITAL | Age: 26
End: 2021-02-26

## 2021-02-26 DIAGNOSIS — Z01.818 PREOP TESTING: Primary | ICD-10-CM

## 2021-02-26 LAB — SARS-COV-2 ORF1AB RESP QL NAA+PROBE: NOT DETECTED

## 2021-02-26 PROCEDURE — U0004 COV-19 TEST NON-CDC HGH THRU: HCPCS

## 2021-02-26 PROCEDURE — C9803 HOPD COVID-19 SPEC COLLECT: HCPCS

## 2021-03-01 ENCOUNTER — ANESTHESIA (OUTPATIENT)
Dept: GASTROENTEROLOGY | Facility: HOSPITAL | Age: 26
End: 2021-03-01

## 2021-03-01 ENCOUNTER — HOSPITAL ENCOUNTER (OUTPATIENT)
Facility: HOSPITAL | Age: 26
Setting detail: HOSPITAL OUTPATIENT SURGERY
Discharge: HOME | End: 2021-03-01
Attending: INTERNAL MEDICINE | Admitting: INTERNAL MEDICINE

## 2021-03-01 ENCOUNTER — ANESTHESIA EVENT (OUTPATIENT)
Dept: GASTROENTEROLOGY | Facility: HOSPITAL | Age: 26
End: 2021-03-01

## 2021-03-01 VITALS
RESPIRATION RATE: 18 BRPM | DIASTOLIC BLOOD PRESSURE: 50 MMHG | WEIGHT: 124 LBS | BODY MASS INDEX: 21.17 KG/M2 | SYSTOLIC BLOOD PRESSURE: 106 MMHG | OXYGEN SATURATION: 98 % | HEART RATE: 67 BPM | TEMPERATURE: 97.1 F | HEIGHT: 64 IN

## 2021-03-01 DIAGNOSIS — K21.00 GASTROESOPHAGEAL REFLUX DISEASE WITH ESOPHAGITIS WITHOUT HEMORRHAGE: ICD-10-CM

## 2021-03-01 DIAGNOSIS — R10.13 EPIGASTRIC PAIN: ICD-10-CM

## 2021-03-01 LAB — B-HCG UR QL: NEGATIVE

## 2021-03-01 PROCEDURE — 81025 URINE PREGNANCY TEST: CPT | Performed by: INTERNAL MEDICINE

## 2021-03-01 PROCEDURE — 43239 EGD BIOPSY SINGLE/MULTIPLE: CPT | Performed by: INTERNAL MEDICINE

## 2021-03-01 PROCEDURE — 25010000002 FENTANYL CITRATE (PF) 100 MCG/2ML SOLUTION: Performed by: NURSE ANESTHETIST, CERTIFIED REGISTERED

## 2021-03-01 PROCEDURE — 25010000002 PROPOFOL 10 MG/ML EMULSION: Performed by: NURSE ANESTHETIST, CERTIFIED REGISTERED

## 2021-03-01 RX ORDER — PROMETHAZINE HYDROCHLORIDE 25 MG/1
25 TABLET ORAL ONCE AS NEEDED
Status: DISCONTINUED | OUTPATIENT
Start: 2021-03-01 | End: 2021-03-01 | Stop reason: HOSPADM

## 2021-03-01 RX ORDER — PROPOFOL 10 MG/ML
VIAL (ML) INTRAVENOUS AS NEEDED
Status: DISCONTINUED | OUTPATIENT
Start: 2021-03-01 | End: 2021-03-01 | Stop reason: SURG

## 2021-03-01 RX ORDER — ONDANSETRON 2 MG/ML
4 INJECTION INTRAMUSCULAR; INTRAVENOUS ONCE AS NEEDED
Status: DISCONTINUED | OUTPATIENT
Start: 2021-03-01 | End: 2021-03-01 | Stop reason: HOSPADM

## 2021-03-01 RX ORDER — MEPERIDINE HYDROCHLORIDE 25 MG/ML
12.5 INJECTION INTRAMUSCULAR; INTRAVENOUS; SUBCUTANEOUS
Status: DISCONTINUED | OUTPATIENT
Start: 2021-03-01 | End: 2021-03-01 | Stop reason: HOSPADM

## 2021-03-01 RX ORDER — LIDOCAINE HYDROCHLORIDE 20 MG/ML
INJECTION, SOLUTION EPIDURAL; INFILTRATION; INTRACAUDAL; PERINEURAL AS NEEDED
Status: DISCONTINUED | OUTPATIENT
Start: 2021-03-01 | End: 2021-03-01 | Stop reason: SURG

## 2021-03-01 RX ORDER — DEXTROSE AND SODIUM CHLORIDE 5; .45 G/100ML; G/100ML
30 INJECTION, SOLUTION INTRAVENOUS CONTINUOUS PRN
Status: DISCONTINUED | OUTPATIENT
Start: 2021-03-01 | End: 2021-03-01 | Stop reason: HOSPADM

## 2021-03-01 RX ORDER — FENTANYL CITRATE 50 UG/ML
INJECTION, SOLUTION INTRAMUSCULAR; INTRAVENOUS AS NEEDED
Status: DISCONTINUED | OUTPATIENT
Start: 2021-03-01 | End: 2021-03-01 | Stop reason: SURG

## 2021-03-01 RX ORDER — PROMETHAZINE HYDROCHLORIDE 25 MG/1
25 SUPPOSITORY RECTAL ONCE AS NEEDED
Status: DISCONTINUED | OUTPATIENT
Start: 2021-03-01 | End: 2021-03-01 | Stop reason: HOSPADM

## 2021-03-01 RX ADMIN — PROPOFOL 100 MG: 10 INJECTION, EMULSION INTRAVENOUS at 15:49

## 2021-03-01 RX ADMIN — DEXTROSE AND SODIUM CHLORIDE 30 ML/HR: 5; 450 INJECTION, SOLUTION INTRAVENOUS at 15:40

## 2021-03-01 RX ADMIN — PROPOFOL 10 MG: 10 INJECTION, EMULSION INTRAVENOUS at 15:53

## 2021-03-01 RX ADMIN — LIDOCAINE HYDROCHLORIDE 70 MG: 20 INJECTION, SOLUTION EPIDURAL; INFILTRATION; INTRACAUDAL; PERINEURAL at 15:49

## 2021-03-01 RX ADMIN — PROPOFOL 20 MG: 10 INJECTION, EMULSION INTRAVENOUS at 15:51

## 2021-03-01 RX ADMIN — FENTANYL CITRATE 50 MCG: 50 INJECTION INTRAMUSCULAR; INTRAVENOUS at 15:49

## 2021-03-01 NOTE — ANESTHESIA PREPROCEDURE EVALUATION
Anesthesia Evaluation     no history of anesthetic complications:  NPO Solid Status: > 8 hours  NPO Liquid Status: > 2 hours           Airway   Mallampati: II  TM distance: >3 FB  Neck ROM: full  Small opening  Dental      Pulmonary - negative pulmonary ROS    breath sounds clear to auscultation  Cardiovascular - negative cardio ROS    Rhythm: regular  Rate: normal        Neuro/Psych  (+) headaches, dizziness/light headedness, psychiatric history Anxiety,     (-) numbness  GI/Hepatic/Renal/Endo    (+)  GERD poorly controlled,    (-) liver disease, no renal disease, diabetes, no thyroid disorder    Musculoskeletal (-) negative ROS    Abdominal    Substance History - negative use     OB/GYN negative ob/gyn ROS     Comment: Neg HCG      Other - negative ROS                       Anesthesia Plan    ASA 1     MAC     intravenous induction     Anesthetic plan, all risks, benefits, and alternatives have been provided, discussed and informed consent has been obtained with: patient.

## 2021-03-01 NOTE — ANESTHESIA POSTPROCEDURE EVALUATION
Patient: Toshia Barraza    Procedure Summary     Date: 03/01/21 Room / Location: [facility] ENDOSCOPY 1 / [facility] ENDOSCOPY    Anesthesia Start: 1548 Anesthesia Stop: 1556    Procedure: ESOPHAGOGASTRODUODENOSCOPY possible dilation (N/A ) Diagnosis:       Epigastric pain      Gastroesophageal reflux disease with esophagitis without hemorrhage      (Epigastric pain [R10.13])      (Gastroesophageal reflux disease with esophagitis without hemorrhage [K21.00])    Surgeon: Aidan Alvarado MD Provider: Max Martinez CRNA    Anesthesia Type: MAC ASA Status: 1          Anesthesia Type: MAC    Vitals  No vitals data found for the desired time range.          Post Anesthesia Care and Evaluation    Patient location during evaluation: bedside  Patient participation: waiting for patient participation  Level of consciousness: responsive to verbal stimuli  Pain management: adequate  Airway patency: patent  Anesthetic complications: No anesthetic complications  PONV Status: none  Cardiovascular status: acceptable  Respiratory status: acceptable  Hydration status: acceptable    Comments: ---------------------------               03/01/21                     1556         ---------------------------   BP:          113/87         Pulse:         88           Resp:          18           Temp:   97.5 °F (36.4 °C)   SpO2:          99%         ---------------------------

## 2021-03-04 ENCOUNTER — OFFICE VISIT (OUTPATIENT)
Dept: GASTROENTEROLOGY | Facility: CLINIC | Age: 26
End: 2021-03-04

## 2021-03-04 VITALS
HEART RATE: 95 BPM | BODY MASS INDEX: 21.13 KG/M2 | DIASTOLIC BLOOD PRESSURE: 73 MMHG | HEIGHT: 64 IN | SYSTOLIC BLOOD PRESSURE: 123 MMHG | WEIGHT: 123.8 LBS

## 2021-03-04 DIAGNOSIS — K29.50 CHRONIC GASTRITIS WITHOUT BLEEDING, UNSPECIFIED GASTRITIS TYPE: ICD-10-CM

## 2021-03-04 DIAGNOSIS — K21.00 GASTROESOPHAGEAL REFLUX DISEASE WITH ESOPHAGITIS WITHOUT HEMORRHAGE: Primary | ICD-10-CM

## 2021-03-04 PROBLEM — Z76.89 ESTABLISHING CARE WITH NEW DOCTOR, ENCOUNTER FOR: Status: ACTIVE | Noted: 2019-03-28

## 2021-03-04 PROBLEM — R94.39 ABNORMAL NUCLEAR STRESS TEST: Status: ACTIVE | Noted: 2019-05-01

## 2021-03-04 PROBLEM — L60.8 DISCOLORATION AND THICKENING OF NAILS BOTH FEET: Status: ACTIVE | Noted: 2019-03-28

## 2021-03-04 LAB
LAB AP CASE REPORT: NORMAL
PATH REPORT.FINAL DX SPEC: NORMAL

## 2021-03-04 PROCEDURE — 99213 OFFICE O/P EST LOW 20 MIN: CPT | Performed by: NURSE PRACTITIONER

## 2021-03-04 RX ORDER — DEXLANSOPRAZOLE 60 MG/1
60 CAPSULE, DELAYED RELEASE ORAL DAILY
Qty: 30 CAPSULE | Refills: 5 | Status: SHIPPED | OUTPATIENT
Start: 2021-03-04 | End: 2021-08-18 | Stop reason: SDUPTHER

## 2021-03-04 RX ORDER — SUCRALFATE ORAL 1 G/10ML
1 SUSPENSION ORAL
Qty: 1260 ML | Refills: 0 | Status: SHIPPED | OUTPATIENT
Start: 2021-03-04 | End: 2021-03-08

## 2021-03-04 RX ORDER — PANTOPRAZOLE SODIUM 40 MG/1
40 TABLET, DELAYED RELEASE ORAL DAILY
Qty: 30 TABLET | Refills: 5 | Status: SHIPPED | OUTPATIENT
Start: 2021-03-04 | End: 2021-03-04

## 2021-03-04 NOTE — PROGRESS NOTES
Chief Complaint   Patient presents with   • Abdominal Pain       Subjective    Toshia Barraza is a 26 y.o. female. she is here today for follow-up.    History of Present Illness  26-year-old female presents to discuss EGD results.  States since procedure she has had epigastric cramping at night and increased reflux Prilosec does not seem to be controlling symptoms she has been taking twice daily has not been taking Carafate.  She reports nausea but denies vomiting.  In the past she has tried Protonix cannot tolerate medicine due to side effect of  her lips tingling.  Denies any changes in bowel habits or blood within her stool.  EGD noted mildly severe esophagitis gastritis and normal duodenum.  Duodenal biopsy noted no significant histologic abnormalities.  Antrum biopsy noted reactive gastropathy.  Esophageal biopsy noted benign squamous mucosa.  HIDA scan noted normal visualization of the gallbladder with a normal ejection fraction of 57% patient does report she was very sick after HIDA scan.       The following portions of the patient's history were reviewed and updated as appropriate:   Past Medical History:   Diagnosis Date   • Distorted body image    • Esophagitis 4/28/2019   • GERD (gastroesophageal reflux disease)      Past Surgical History:   Procedure Laterality Date   • BREAST AUGMENTATION     • BREAST BIOPSY     • COLONOSCOPY N/A 4/10/2019    Procedure: COLONOSCOPY;  Surgeon: Aidan Alvarado MD;  Location: [facility] ENDOSCOPY;  Service: Gastroenterology   • ENDOSCOPY N/A 4/10/2019    Procedure: ESOPHAGOGASTRODUODENOSCOPY Wed/Fri;  Surgeon: Aidan Alvarado MD;  Location: [facility] ENDOSCOPY;  Service: Gastroenterology   • ENDOSCOPY N/A 6/2/2020    Procedure: ESOPHAGOGASTRODUODENOSCOPY possible dilation;  Surgeon: Aidan Alvarado MD;  Location: [facility] ENDOSCOPY;  Service: Gastroenterology;  Laterality: N/A;     Family History   Problem Relation Age of Onset   • Other Mother         hematologic disorder    • Heart disease Mother         ischemic heart disease   • Heart attack Mother    • Leukemia Mother    • Ovarian cancer Mother         questionable   • Heart disease Father    • Hypertension Father      OB History        2    Para   2    Term   1       1    AB        Living   2       SAB        TAB        Ectopic        Molar        Multiple        Live Births   2              Prior to Admission medications    Medication Sig Start Date End Date Taking? Authorizing Provider   aspirin (aspirin) 81 MG EC tablet Take 1 tablet by mouth Daily. 21  Yes Ciera Chinchilla APRN   baclofen (LIORESAL) 10 MG tablet Take 1 tablet by mouth At Night As Needed for Muscle Spasms (muscular cramps/tension). For neck pain. 21  Yes Ciera Chinchilla APRN   butalbital-acetaminophen  MG tablet tablet Take 1 tablet by mouth Every 6 (Six) Hours As Needed (headache). 19  Yes Ciera Chinchilla APRN   Calcium Carb-Cholecalciferol (CALCIUM-VITAMIN D) 500-200 MG-UNIT per tablet Take 1 tablet by mouth 2 (Two) Times a Day With Meals. 20  Yes Ciera Chinchilla APRN   cyanocobalamin 1000 MCG/ML injection Inject 1 mL into the appropriate muscle as directed by prescriber Every 28 (Twenty-Eight) Days. 20  Yes Ciera Chinchilla APRN   escitalopram (LEXAPRO) 10 MG tablet Take 1 tablet by mouth Daily. 19  Yes Ciera Chinchilla APRN   etodolac XL (LODINE XL) 400 MG 24 hr tablet Take 1 tablet by mouth Daily. Antiinflammatory for lower back pain 21  Yes Ciera Chinchilla APRN   gabapentin (NEURONTIN) 100 MG capsule Take 1-3 capsules up to three times per day for nerve pain as needed. 20  Yes Ciera Chinchilla APRN   omeprazole (priLOSEC) 40 MG capsule Take 1 capsule by mouth 2 (two) times a day. 21  Yes Ciera Chinchilla APRN   ondansetron ODT (ZOFRAN-ODT) 4 MG disintegrating tablet Take 1 tablet by mouth Every 8 (Eight) Hours As Needed for Nausea or Vomiting for up to 12 doses.  "11/15/19  Yes Kishore Barrera MD   sucralfate (CARAFATE) 1 GM/10ML suspension Take 10 mL by mouth 4 (Four) Times a Day With Meals & at Bedtime. 4/17/19  Yes Aaliyah Arrieta APRN   Syringe/Needle, Disp, 25G X 5/8\" 3 ML misc 1 syringe Every 30 (Thirty) Days. 9/11/20  Yes Ciera Chinchilla APRN     Allergies   Allergen Reactions   • Beef-Derived Products Other (See Comments)     Alpha-gal   • Pork-Derived Products Other (See Comments)     Alpha-gal     Social History     Socioeconomic History   • Marital status:      Spouse name: Not on file   • Number of children: Not on file   • Years of education: Not on file   • Highest education level: Not on file   Tobacco Use   • Smoking status: Never Smoker   • Smokeless tobacco: Never Used   Substance and Sexual Activity   • Alcohol use: Yes     Frequency: Never     Comment: social   • Drug use: No   • Sexual activity: Defer       Review of Systems  Review of Systems   Constitutional: Positive for fatigue. Negative for activity change, appetite change, chills, diaphoresis, fever and unexpected weight change.   HENT: Negative for sore throat and trouble swallowing.    Respiratory: Negative for shortness of breath.    Gastrointestinal: Positive for abdominal pain and nausea. Negative for abdominal distention, anal bleeding, blood in stool, constipation, diarrhea, rectal pain and vomiting.   Musculoskeletal: Negative for arthralgias.   Skin: Negative for pallor.   Neurological: Negative for light-headedness.        /73 (BP Location: Left arm)   Pulse 95   Ht 162.6 cm (64\")   Wt 56.2 kg (123 lb 12.8 oz)   LMP 02/12/2021   BMI 21.25 kg/m²     Objective    Physical Exam  Constitutional:       General: She is not in acute distress.     Appearance: Normal appearance. She is well-developed.   HENT:      Head: Normocephalic and atraumatic.   Neck:      Musculoskeletal: Normal range of motion and neck supple.      Thyroid: No thyromegaly.   Cardiovascular:      Rate " and Rhythm: Normal rate and regular rhythm.      Heart sounds: Normal heart sounds.   Pulmonary:      Effort: Pulmonary effort is normal.      Breath sounds: Normal breath sounds. No wheezing, rhonchi or rales.   Abdominal:      General: Bowel sounds are normal. There is no distension.      Palpations: Abdomen is soft. Abdomen is not rigid.      Tenderness: There is abdominal tenderness in the epigastric area, periumbilical area and left upper quadrant. There is no guarding.      Hernia: No hernia is present.   Lymphadenopathy:      Cervical: No cervical adenopathy.   Skin:     General: Skin is warm and dry.      Coloration: Skin is not pale.      Findings: No rash.   Neurological:      Mental Status: She is alert and oriented to person, place, and time.   Psychiatric:         Speech: Speech normal.         Behavior: Behavior is cooperative.       Admission on 03/01/2021, Discharged on 03/01/2021   Component Date Value Ref Range Status   • HCG, Urine QL 03/01/2021 Negative  Negative Final   • Case Report 03/01/2021    Final                    Value:Surgical Pathology Report                         Case: [id number]                                  Authorizing Provider:  Aidan Alvarado MD        Collected:           03/01/2021 03:55 PM          Ordering Location:     [facility]             Received:            03/02/2021 07:25 AM                                 [place] [facility]                                                     Pathologist:           Den Torres MD                                                           Specimens:   1) - Small Intestine, Duodenum, bx                                                                  2) - Gastric, Antrum, bx                                                                            3) - Esophagus, Distal, bx                                                                • Final Diagnosis 03/01/2021    Final                    Value:This result  contains rich text formatting which cannot be displayed here.     Assessment/Plan      1. Gastroesophageal reflux disease with esophagitis without hemorrhage    2. Chronic gastritis without bleeding, unspecified gastritis type    .   Discontinue Prilosec start patient on Dexilant daily avoid gastric irritants start taking Carafate before meals and bedtime if symptoms persist or worsen will plan CT  to further evaluate    Orders placed during this encounter include:  No orders of the defined types were placed in this encounter.      * Surgery not found *    Review and/or summary of lab tests, radiology, procedures, medications. Review and summary of old records and obtaining of history. The risks and benefits of my recommendations, as well as other treatment options were discussed with the patient today. Questions were answered.    New Medications Ordered This Visit   Medications   • sucralfate (Carafate) 1 GM/10ML suspension     Sig: Take 10 mL by mouth 4 (Four) Times a Day With Meals & at Bedtime.     Dispense:  1260 mL     Refill:  0   • dexlansoprazole (DEXILANT) 60 MG capsule     Sig: Take 1 capsule by mouth Daily for 180 days.     Dispense:  30 capsule     Refill:  5       Follow-up: Return in about 4 weeks (around 4/1/2021) for Recheck.          This document has been electronically signed by LATRICE Perace on March 4, 2021 16:37 CST           I spent 22 minutes caring for Toshia on this date of service. This time includes time spent by me in the following activities:preparing for the visit, reviewing tests, obtaining and/or reviewing a separately obtained history, performing a medically appropriate examination and/or evaluation , counseling and educating the patient/family/caregiver, ordering medications, tests, or procedures, documenting information in the medical record and care coordination    Results for orders placed or performed during the hospital encounter of 03/01/21   Tissue Pathology Exam     Specimen: A: Small Intestine, Duodenum; Tissue    B: Gastric, Antrum; Tissue    C: Esophagus, Distal; Tissue   Result Value Ref Range    Case Report       Surgical Pathology Report                         Case: [id number]                                  Authorizing Provider:  Aidan Alvarado MD        Collected:           03/01/2021 03:55 PM          Ordering Location:     [facility]             Received:            03/02/2021 07:25 AM                                 [place] [facility]                                                     Pathologist:           Den Torres MD                                                           Specimens:   1) - Small Intestine, Duodenum, bx                                                                  2) - Gastric, Antrum, bx                                                                            3) - Esophagus, Distal, bx                                                                 Final Diagnosis       See Scanned Report       Pregnancy, Urine - Urine, Clean Catch    Specimen: Urine, Clean Catch   Result Value Ref Range    HCG, Urine QL Negative Negative   Results for orders placed or performed in visit on 02/26/21   COVID-19,[facility], NP/OP SWAB IN UTM/VTM/SALINE TRANSPORT MEDIA,24 HR TAT - Swab, Nasopharynx    Specimen: Nasopharynx; Swab   Result Value Ref Range    COVID19 Not Detected Not Detected - Ref. Range   Results for orders placed or performed during the hospital encounter of 10/26/20   COVID-19,[facility], NP SWAB IN TRANSPORT MEDIA 48-72 HR TAT - Swab, Nasopharynx    Specimen: Nasopharynx; Swab   Result Value Ref Range    Reference Lab Report      COVID19 Detected (C) Not Detected - Ref. Range   Results for orders placed or performed in visit on 09/29/20   Urinalysis without microscopic (no culture) - Urine, Clean Catch    Specimen: Urine, Clean Catch   Result Value Ref Range    Color, UA Yellow Yellow, Straw     Appearance, UA Turbid (A) Clear    pH, UA 5.5 5.0 - 8.0    Specific Gravity, UA >=1.030 1.005 - 1.030    Glucose, UA Negative Negative    Ketones, UA Negative Negative    Bilirubin, UA Negative Negative    Blood, UA Negative Negative    Protein, UA Negative Negative    Leuk Esterase, UA Negative Negative    Nitrite, UA Negative Negative    Urobilinogen, UA 1.0 E.U./dL 0.2 - 1.0 E.U./dL   Urine Culture - Urine, Urine, Clean Catch    Specimen: Urine, Clean Catch   Result Value Ref Range    Urine Culture <10,000 CFU/mL Mixed Manasa Isolated    Results for orders placed or performed in visit on 09/10/20   Alpha - Gal Panel    Specimen: Blood   Result Value Ref Range    Beef 0.38 (H) <0.35 kU/L    Class Description 1     [place] 0.19 <0.35 kU/L    Class Interpretation 0/1     Pork 0.27 <0.35 kU/L    Class Interpretation 0/1     Alpha Gal IgE 0.49 (H) <0.10 kU/L   Results for orders placed or performed in visit on 08/21/20   Alpha Gal IgE    Specimen: Blood   Result Value Ref Range    Alpha Gal IgE 0.55 (H) <0.10 kU/L   NADIRA Direct Reflex to 11 Biomarker    Specimen: Blood   Result Value Ref Range    NADIRA Direct Negative Negative   EBV Antibody Profile    Specimen: Blood   Result Value Ref Range    EBV VCA IgM <36.0 0.0 - 35.9 U/mL    EBV VCA IgG 41.5 (H) 0.0 - 17.9 U/mL    EBV Nuclear Antigen Ab, IgG 31.6 (H) 0.0 - 17.9 U/mL    Interpretation Comment    Pathology Consultation    Specimen: Arm; Blood   Result Value Ref Range    Final Diagnosis       1. Peripheral Blood Smear:        A. Adequate white cells, red cells and platelets.       Comment: Relatively normal distribution of white cells with occasional vacuolated macrophages and rare atypical lymphocytes noted.  No blasts are seen.  Morphologic features suggest possible viral infection.  Platelets appear adequate.  A few giant platelets are noted.  Clinical correlation is required.    jat/jscarlos         Clinical Information       Recent CBC data includes WBC = 4.46, RBC =  4.54, HGB = 13.9, HCT = 40.7, MCV = 89.6, MCH = 30.6, MCHC = 34.2, RDW = 12.1, MPV = 12.1, platelets = 186K.      Gross Description       Two routinely stained peripheral blood smears.   renet/dorothea       Case Report       Surgical Pathology Report                         Case: [id number]                                  Authorizing Provider:  Ciera Chinchilla APRN   Collected:           08/21/2020 07:35 AM          Ordering Location:     [facility]     Received:            08/24/2020 08:55 AM                                 [facility]                                                               Pathologist:           Parminder Sena MD                                                         Specimen:    Arm, Perpheral Blood Smear                                                                Food Allergy Profile    Specimen: Blood   Result Value Ref Range    Class Description Comment     Egg White <0.10 Class 0 kU/L    Peanut <0.10 Class 0 kU/L    Soybean <0.10 Class 0 kU/L    Milk, Cow's 0.28 (A) Class 0/I kU/L    Clams <0.10 Class 0 kU/L    Shrimp <0.10 Class 0 kU/L    [place] <0.10 Class 0 kU/L    CodFish <0.10 Class 0 kU/L    Scallop <0.10 Class 0 kU/L    Wheat <0.10 Class 0 kU/L    Corn <0.10 Class 0 kU/L    Sesame Seed <0.10 Class 0 kU/L   [facility] (IgG / M)    Specimen: Blood   Result Value Ref Range    RMSF IgG Negative Negative    RMSF IgM 0.81 0.00 - 0.89 index   Lyme, Total Antibody Test / Reflex    Specimen: Blood   Result Value Ref Range    Lyme Ab IgG/IgM <0.91 0.00 - 0.90 ISR   CBC Auto Differential    Specimen: Blood   Result Value Ref Range    WBC 4.46 3.40 - 10.80 10*3/mm3    RBC 4.54 3.77 - 5.28 10*6/mm3    Hemoglobin 13.9 12.0 - 15.9 g/dL    Hematocrit 40.7 34.0 - 46.6 %    MCV 89.6 79.0 - 97.0 fL    MCH 30.6 26.6 - 33.0 pg    MCHC 34.2 31.5 - 35.7 g/dL    RDW 12.1 (L) 12.3 - 15.4 %    RDW-SD 38.8 37.0 - 54.0 fl    MPV 12.1 (H) 6.0 - 12.0 fL    Platelets 186 140 - 450  10*3/mm3    Neutrophil % 53.5 42.7 - 76.0 %    Lymphocyte % 36.1 19.6 - 45.3 %    Monocyte % 7.2 5.0 - 12.0 %    Eosinophil % 2.5 0.3 - 6.2 %    Basophil % 0.7 0.0 - 1.5 %    Neutrophils, Absolute 2.39 1.70 - 7.00 10*3/mm3    Lymphocytes, Absolute 1.61 0.70 - 3.10 10*3/mm3    Monocytes, Absolute 0.32 0.10 - 0.90 10*3/mm3    Eosinophils, Absolute 0.11 0.00 - 0.40 10*3/mm3    Basophils, Absolute 0.03 0.00 - 0.20 10*3/mm3   Cytomegalovirus Antibody, IgM    Specimen: Blood   Result Value Ref Range    CMV IgM <30.0 0.0 - 29.9 AU/mL   Peripheral Blood Smear    Specimen: Blood   Result Value Ref Range    Pathology Review Yes    Vitamin D 25 hydroxy    Specimen: Blood   Result Value Ref Range    25 Hydroxy, Vitamin D 42.1 30.0 - 100.0 ng/ml   RPR    Specimen: Blood   Result Value Ref Range    RPR Non-Reactive Non-Reactive   Mononucleosis Screen    Specimen: Blood   Result Value Ref Range    Monospot Negative Negative   Cytomegalovirus Antibody, IgG    Specimen: Blood   Result Value Ref Range    CMV IgG <0.60 0.00 - 0.59 U/mL   Blood Culture - Blood, Arm, Left    Specimen: Arm, Left; Blood   Result Value Ref Range    Blood Culture No growth at 5 days    Sedimentation Rate    Specimen: Blood   Result Value Ref Range    Sed Rate 1 0 - 20 mm/hr   TSH    Specimen: Blood   Result Value Ref Range    TSH 1.950 0.270 - 4.200 uIU/mL   T4, free    Specimen: Blood   Result Value Ref Range    Free T4 1.17 0.93 - 1.70 ng/dL   Vitamin B12    Specimen: Blood   Result Value Ref Range    Vitamin B-12 411 211 - 946 pg/mL   Comprehensive metabolic panel    Specimen: Blood   Result Value Ref Range    Glucose 90 70 - 99 mg/dL    BUN 11 7 - 23 mg/dL    Creatinine 0.67 0.52 - 1.04 mg/dL    Sodium 138 137 - 145 mmol/L    Potassium 3.9 3.4 - 5.0 mmol/L    Chloride 101 101 - 112 mmol/L    CO2 29.0 22.0 - 30.0 mmol/L    Calcium 9.3 8.4 - 10.2 mg/dL    Total Protein 7.7 6.3 - 8.6 g/dL    Albumin 4.60 3.50 - 5.00 g/dL    ALT (SGPT) 21 <=35 U/L    AST  (SGOT) 27 14 - 36 U/L    Alkaline Phosphatase 62 38 - 126 U/L    Total Bilirubin 0.7 0.2 - 1.3 mg/dL    eGFR Non  Amer 109 71 - 165 mL/min/1.73    Globulin 3.1 2.3 - 3.5 gm/dL    A/G Ratio 1.5 1.1 - 1.8 g/dL    BUN/Creatinine Ratio 16.4 7.0 - 25.0    Anion Gap 8.0 5.0 - 15.0 mmol/L     *Note: Due to a large number of results and/or encounters for the requested time period, some results have not been displayed. A complete set of results can be found in Results Review.

## 2021-03-04 NOTE — PATIENT INSTRUCTIONS
Gastritis, Adult    Gastritis is swelling (inflammation) of the stomach. Gastritis can develop quickly (acute). It can also develop slowly over time (chronic). It is important to get help for this condition. If you do not get help, your stomach can bleed, and you can get sores (ulcers) in your stomach.  What are the causes?  This condition may be caused by:  · Germs that get to your stomach.  · Drinking too much alcohol.  · Medicines you are taking.  · Too much acid in the stomach.  · A disease of the intestines or stomach.  · Stress.  · An allergic reaction.  · Crohn's disease.  · Some cancer treatments (radiation).  Sometimes the cause of this condition is not known.  What are the signs or symptoms?  Symptoms of this condition include:  · Pain in your stomach.  · A burning feeling in your stomach.  · Feeling sick to your stomach (nauseous).  · Throwing up (vomiting).  · Feeling too full after you eat.  · Weight loss.  · Bad breath.  · Throwing up blood.  · Blood in your poop (stool).  How is this diagnosed?  This condition may be diagnosed with:  · Your medical history and symptoms.  · A physical exam.  · Tests. These can include:  ? Blood tests.  ? Stool tests.  ? A procedure to look inside your stomach (upper endoscopy).  ? A test in which a sample of tissue is taken for testing (biopsy).  How is this treated?  Treatment for this condition depends on what caused it. You may be given:  · Antibiotic medicine, if your condition was caused by germs.  · H2 blockers and similar medicines, if your condition was caused by too much acid.  Follow these instructions at home:  Medicines  · Take over-the-counter and prescription medicines only as told by your doctor.  · If you were prescribed an antibiotic medicine, take it as told by your doctor. Do not stop taking it even if you start to feel better.  Eating and drinking    · Eat small meals often, instead of large meals.  · Avoid foods and drinks that make your  symptoms worse.  · Drink enough fluid to keep your pee (urine) pale yellow.  Alcohol use  · Do not drink alcohol if:  ? Your doctor tells you not to drink.  ? You are pregnant, may be pregnant, or are planning to become pregnant.  · If you drink alcohol:  ? Limit your use to:  § 0-1 drink a day for women.  § 0-2 drinks a day for men.  ? Be aware of how much alcohol is in your drink. In the [place] one drink equals one 12 oz bottle of beer (355 mL), one 5 oz glass of wine (148 mL), or one 1½ oz glass of hard liquor (44 mL).  General instructions  · Talk with your doctor about ways to manage stress. You can exercise or do deep breathing, meditation, or yoga.  · Do not smoke or use products that have nicotine or tobacco. If you need help quitting, ask your doctor.  · Keep all follow-up visits as told by your doctor. This is important.  Contact a doctor if:  · Your symptoms get worse.  · Your symptoms go away and then come back.  Get help right away if:  · You throw up blood or something that looks like coffee grounds.  · You have black or dark red poop.  · You throw up any time you try to drink fluids.  · Your stomach pain gets worse.  · You have a fever.  · You do not feel better after one week.  Summary  · Gastritis is swelling (inflammation) of the stomach.  · You must get help for this condition. If you do not get help, your stomach can bleed, and you can get sores (ulcers).  · This condition is diagnosed with medical history, physical exam, or tests.  · You can be treated with medicines for germs or medicines to block too much acid in your stomach.  This information is not intended to replace advice given to you by your health care provider. Make sure you discuss any questions you have with your health care provider.  Document Revised: 05/07/2019 Document Reviewed: 05/07/2019  Elsevier Patient Education © 2020 [employer]

## 2021-03-08 RX ORDER — SUCRALFATE ORAL 1 G/10ML
SUSPENSION ORAL
Qty: 1260 ML | Refills: 2 | Status: SHIPPED | OUTPATIENT
Start: 2021-03-08 | End: 2022-12-12 | Stop reason: HOSPADM

## 2021-03-12 ENCOUNTER — TELEPHONE (OUTPATIENT)
Dept: GASTROENTEROLOGY | Facility: CLINIC | Age: 26
End: 2021-03-12

## 2021-03-12 ENCOUNTER — DOCUMENTATION (OUTPATIENT)
Dept: GASTROENTEROLOGY | Facility: CLINIC | Age: 26
End: 2021-03-12

## 2021-03-14 ENCOUNTER — TELEPHONE (OUTPATIENT)
Dept: FAMILY MEDICINE CLINIC | Facility: CLINIC | Age: 26
End: 2021-03-14

## 2021-03-14 DIAGNOSIS — R26.2 AMBULATORY DYSFUNCTION: ICD-10-CM

## 2021-03-14 DIAGNOSIS — M54.42 ACUTE LEFT-SIDED LOW BACK PAIN WITH LEFT-SIDED SCIATICA: ICD-10-CM

## 2021-03-14 DIAGNOSIS — M79.605 LEFT LEG PAIN: ICD-10-CM

## 2021-03-14 DIAGNOSIS — M53.3 SACRAL PAIN: Primary | ICD-10-CM

## 2021-03-15 NOTE — TELEPHONE ENCOUNTER
----- Message from Jeanette Washburn sent at 3/12/2021 10:10 AM CST -----  Toshia called to check and see if you she had got approval for the sacral and pelvic area MRI? I didn't see the order in so I told her I would ask you.

## 2021-03-16 ENCOUNTER — OFFICE VISIT (OUTPATIENT)
Dept: FAMILY MEDICINE CLINIC | Facility: CLINIC | Age: 26
End: 2021-03-16

## 2021-03-16 VITALS
DIASTOLIC BLOOD PRESSURE: 72 MMHG | WEIGHT: 124.6 LBS | BODY MASS INDEX: 21.27 KG/M2 | HEIGHT: 64 IN | OXYGEN SATURATION: 95 % | SYSTOLIC BLOOD PRESSURE: 100 MMHG | HEART RATE: 72 BPM

## 2021-03-16 DIAGNOSIS — R11.0 NAUSEA: ICD-10-CM

## 2021-03-16 DIAGNOSIS — R41.3 MEMORY DEFICIT: ICD-10-CM

## 2021-03-16 DIAGNOSIS — R00.2 PALPITATIONS: ICD-10-CM

## 2021-03-16 DIAGNOSIS — K21.9 GASTROESOPHAGEAL REFLUX DISEASE WITHOUT ESOPHAGITIS: ICD-10-CM

## 2021-03-16 DIAGNOSIS — G43.111 INTRACTABLE MIGRAINE WITH AURA WITH STATUS MIGRAINOSUS: ICD-10-CM

## 2021-03-16 DIAGNOSIS — H53.8 BLURRY VISION: ICD-10-CM

## 2021-03-16 DIAGNOSIS — R68.84 JAW PAIN: ICD-10-CM

## 2021-03-16 DIAGNOSIS — R59.1 LYMPHADENOPATHY: Primary | ICD-10-CM

## 2021-03-16 DIAGNOSIS — R41.840 DIFFICULTY CONCENTRATING: ICD-10-CM

## 2021-03-16 DIAGNOSIS — E88.09 ALPHA GALACTOSIDASE DEFICIENCY: ICD-10-CM

## 2021-03-16 PROCEDURE — 99214 OFFICE O/P EST MOD 30 MIN: CPT | Performed by: NURSE PRACTITIONER

## 2021-04-05 ENCOUNTER — HOSPITAL ENCOUNTER (OUTPATIENT)
Dept: MRI IMAGING | Facility: HOSPITAL | Age: 26
Discharge: HOME | End: 2021-04-05
Admitting: NURSE PRACTITIONER

## 2021-04-05 DIAGNOSIS — G43.111 INTRACTABLE MIGRAINE WITH AURA WITH STATUS MIGRAINOSUS: ICD-10-CM

## 2021-04-05 DIAGNOSIS — R41.840 DIFFICULTY CONCENTRATING: ICD-10-CM

## 2021-04-05 DIAGNOSIS — R41.3 MEMORY DEFICIT: ICD-10-CM

## 2021-04-05 DIAGNOSIS — R59.1 LYMPHADENOPATHY: ICD-10-CM

## 2021-04-05 PROCEDURE — 25010000002 GADOTERIDOL PER 1 ML: Performed by: NURSE PRACTITIONER

## 2021-04-05 PROCEDURE — 70553 MRI BRAIN STEM W/O & W/DYE: CPT

## 2021-04-05 PROCEDURE — A9579 GAD-BASE MR CONTRAST NOS,1ML: HCPCS | Performed by: NURSE PRACTITIONER

## 2021-04-05 RX ADMIN — GADOTERIDOL 13 ML: 279.3 INJECTION, SOLUTION INTRAVENOUS at 12:45

## 2021-04-05 NOTE — PROGRESS NOTES
Thankfully mri of brain is normal. Let me look at her previous note and see what else needs to be done. But for now wanted to let her know it is normal so she doesn't worry.

## 2021-04-08 ENCOUNTER — APPOINTMENT (OUTPATIENT)
Dept: MRI IMAGING | Facility: HOSPITAL | Age: 26
End: 2021-04-08

## 2021-04-13 ENCOUNTER — OFFICE VISIT (OUTPATIENT)
Dept: FAMILY MEDICINE CLINIC | Facility: CLINIC | Age: 26
End: 2021-04-13

## 2021-04-13 VITALS — HEIGHT: 64 IN | WEIGHT: 124 LBS | BODY MASS INDEX: 21.17 KG/M2

## 2021-04-13 DIAGNOSIS — R00.0 TACHYCARDIA: ICD-10-CM

## 2021-04-13 DIAGNOSIS — F41.0 PANIC ATTACKS: Primary | ICD-10-CM

## 2021-04-13 DIAGNOSIS — U07.1 LAB TEST POSITIVE FOR DETECTION OF COVID-19 VIRUS: ICD-10-CM

## 2021-04-13 DIAGNOSIS — R42 DIZZINESS: ICD-10-CM

## 2021-04-13 DIAGNOSIS — R93.89 ABNORMAL CXR: ICD-10-CM

## 2021-04-13 PROCEDURE — 99443 PR PHYS/QHP TELEPHONE EVALUATION 21-30 MIN: CPT | Performed by: NURSE PRACTITIONER

## 2021-04-13 RX ORDER — ALBUTEROL SULFATE 90 UG/1
2 AEROSOL, METERED RESPIRATORY (INHALATION) EVERY 4 HOURS PRN
Qty: 18 G | Refills: 0 | Status: SHIPPED | OUTPATIENT
Start: 2021-04-13 | End: 2022-12-08

## 2021-04-13 RX ORDER — SCOLOPAMINE TRANSDERMAL SYSTEM 1 MG/1
1 PATCH, EXTENDED RELEASE TRANSDERMAL
Qty: 10 EACH | Refills: 3 | Status: SHIPPED | OUTPATIENT
Start: 2021-04-13 | End: 2021-11-08

## 2021-04-13 RX ORDER — HALOPERIDOL 0.5 MG/1
TABLET ORAL
Qty: 90 TABLET | Refills: 0 | Status: SHIPPED | OUTPATIENT
Start: 2021-04-13 | End: 2021-08-18

## 2021-04-13 RX ORDER — PROPRANOLOL HYDROCHLORIDE 10 MG/1
TABLET ORAL
Qty: 90 TABLET | Refills: 0 | Status: SHIPPED | OUTPATIENT
Start: 2021-04-13 | End: 2021-04-13

## 2021-04-13 RX ORDER — PROPRANOLOL HYDROCHLORIDE 10 MG/1
TABLET ORAL
Qty: 270 TABLET | Refills: 1 | Status: SHIPPED | OUTPATIENT
Start: 2021-04-13 | End: 2021-08-18 | Stop reason: DRUGHIGH

## 2021-04-13 RX ORDER — AZITHROMYCIN 500 MG/1
500 TABLET, FILM COATED ORAL DAILY
Qty: 7 TABLET | Refills: 0 | Status: SHIPPED | OUTPATIENT
Start: 2021-04-13 | End: 2021-05-14

## 2021-04-13 NOTE — PROGRESS NOTES
Subjective   Toshia Barraza is a 26 y.o. female who presents via telephone visit for nerves.     History of Present Illness     Last labs: N/A    You have chosen to receive care through a telephone visit. Do you consent to use a telephone visit for your medical care today? Yes    Telephone visit lasted 23 minutes.    Anxiety/panic attacks-acute, new onset of panic attacks worsening.   Pt states she is going to [place], coming back on the 20th to get tests done. States has not been on plane before and has to ride two there and two on the way back. States she is concerned to do how -pt states the other days she was getting her hair done and started to feel shaky then turned pale white, took a nitroglycerin. Thinks she had a panic attack, states increasing worse. When she has a panic attack, in addition to the shaking and turning white, her Heart starts racing and feeling weird, feels like she is gonna pass out and starts shaking and feels like she is gonna pass out. These painc attacks over the past few mtns happen once a week, then twice a week. About now 3x/week. bp goes up really then drops and nerves get really high.  -to help, pt states she tries to hold breath try to count and it helps some but caused some discomfort with.   -also experiencing weird heart pains, taking chewable aspirins, didn't make it stop but made it made it not hurt as bad.   Prior to the panic attack, she has an uneasy feeling and then within a few minutes. And lasts up to 30 mins.   -hx of anxiety. Hx of taking lexapro but didn't help much.   -POC: holter will be set up when pt gets back. Consider ct of chest. Haldol and propranolol trial meds. Propranolol with associated heart palpitations and haldol prn for anxiety but trial haldol at home. Consider long term anxiety meds. Scopolamine patch for nausea to calm anxiety some.     Positive for covid but asymptomatic.  -pt states went to get covid test for trip and was positive but   and kids not positive. Scribed zpak in case she gets sick, albuterol inhaler just in case to have on hand. Recommended getting retested due to false positive and no known exposure and asymptomatic and no one else in family was positive.   -POC: zpak and inhaler prescribed, f/u if not improving.      F/U: pending results.     The following portions of the patient's history were reviewed and updated as appropriate: allergies, current medications, past family history, past medical history, past social history, past surgical history and problem list.    Review of Systems   Constitutional: Positive for activity change, appetite change and fatigue. Negative for chills, diaphoresis, fever and unexpected weight change.   HENT: Negative for congestion, ear discharge, ear pain, nosebleeds, postnasal drip, rhinorrhea, sinus pressure, sinus pain, sneezing, sore throat, tinnitus and trouble swallowing.         Jaw pain   Eyes: Negative.    Respiratory: Positive for chest tightness and shortness of breath. Negative for cough and wheezing.    Cardiovascular: Positive for chest pain and palpitations. Negative for leg swelling.   Gastrointestinal: Positive for nausea. Negative for abdominal distention, abdominal pain, blood in stool, constipation, diarrhea and vomiting.   Genitourinary: Negative for difficulty urinating, dyspareunia, dysuria, flank pain, frequency, hematuria, menstrual problem, vaginal bleeding, vaginal discharge and vaginal pain.   Musculoskeletal: Negative for arthralgias, back pain, gait problem, joint swelling and myalgias.   Skin: Negative for rash and wound.   Allergic/Immunologic: Negative for environmental allergies, food allergies and immunocompromised state.   Neurological: Positive for dizziness, weakness and headaches. Negative for tremors, seizures, syncope, light-headedness and numbness.   Hematological: Positive for adenopathy. Does not bruise/bleed easily.   Psychiatric/Behavioral: Positive for  "confusion, decreased concentration and sleep disturbance. Negative for behavioral problems, self-injury and suicidal ideas. The patient is nervous/anxious.          Objective   Ht 162.6 cm (64\")   Wt 56.2 kg (124 lb)   BMI 21.28 kg/m²   Physical Exam  Neurological:      Mental Status: She is alert. She is not disoriented.   Psychiatric:         Speech: Speech normal.         Behavior: Behavior normal.         Thought Content: Thought content normal.         Judgment: Judgment normal.      Comments: Unable to perform physical exam due to this being a telephone visit.            PHQ-2/PHQ-9 Depression Screening 8/18/2020   Little interest or pleasure in doing things 0   Feeling down, depressed, or hopeless 0   Total Score 0         Assessment/Plan   Diagnoses and all orders for this visit:    1. Panic attacks (Primary)  -     haloperidol (HALDOL) 0.5 MG tablet; Take 1/2-1 tid prn for anxiety  Dispense: 90 tablet; Refill: 0    2. Dizziness  -     Scopolamine (TRANSDERM-SCOP) 1.5 MG/3DAYS patch; Place 1 patch on the skin as directed by provider Every 72 (Seventy-Two) Hours.  Dispense: 10 each; Refill: 3    3. Tachycardia  -     Discontinue: propranolol (INDERAL) 10 MG tablet; Take 1/2-1 tab tid prn for elevated BP/HR  Dispense: 90 tablet; Refill: 0    4. Abnormal CXR  -     Discontinue: azithromycin (Zithromax) 500 MG tablet; Take 1 tablet by mouth Daily.  Dispense: 7 tablet; Refill: 0  -     albuterol sulfate  (90 Base) MCG/ACT inhaler; Inhale 2 puffs Every 4 (Four) Hours As Needed for Wheezing or Shortness of Air.  Dispense: 18 g; Refill: 0    5. Lab test positive for detection of COVID-19 virus  -     Discontinue: azithromycin (Zithromax) 500 MG tablet; Take 1 tablet by mouth Daily.  Dispense: 7 tablet; Refill: 0  -     albuterol sulfate  (90 Base) MCG/ACT inhaler; Inhale 2 puffs Every 4 (Four) Hours As Needed for Wheezing or Shortness of Air.  Dispense: 18 g; Refill: 0             Plan of " Care:    Please See History of Present Illness(HPI) above for Plan of Care (POC) for individualized diagnoses as well as when to follow up.     Patient educated to follow-up sooner than next scheduled appointment if condition(s) worse or do not improve. Patient states understanding and is in agreeance with plan of care. An After Visit Summary was printed and given to the patient.      LATRICE Elizabeth        This document has been electronically signed by LATRICE Elizabeth on June 16, 2021 15:40 CDT      Part of this note may be an electronic transcription/translation of spoken language to printed text using the Dragon Dictation System

## 2021-04-26 ENCOUNTER — OFFICE VISIT (OUTPATIENT)
Dept: FAMILY MEDICINE CLINIC | Facility: CLINIC | Age: 26
End: 2021-04-26

## 2021-04-26 VITALS
OXYGEN SATURATION: 99 % | SYSTOLIC BLOOD PRESSURE: 112 MMHG | DIASTOLIC BLOOD PRESSURE: 66 MMHG | BODY MASS INDEX: 21.51 KG/M2 | HEART RATE: 97 BPM | WEIGHT: 126 LBS | HEIGHT: 64 IN

## 2021-04-26 DIAGNOSIS — K21.00 GASTROESOPHAGEAL REFLUX DISEASE WITH ESOPHAGITIS WITHOUT HEMORRHAGE: Primary | ICD-10-CM

## 2021-04-26 PROCEDURE — 99214 OFFICE O/P EST MOD 30 MIN: CPT | Performed by: NURSE PRACTITIONER

## 2021-04-26 RX ORDER — FAMOTIDINE 20 MG/1
20 TABLET, FILM COATED ORAL NIGHTLY
Qty: 30 TABLET | Refills: 5 | Status: SHIPPED | OUTPATIENT
Start: 2021-04-26 | End: 2021-07-22 | Stop reason: SDUPTHER

## 2021-05-14 ENCOUNTER — OFFICE VISIT (OUTPATIENT)
Dept: OBSTETRICS AND GYNECOLOGY | Facility: CLINIC | Age: 26
End: 2021-05-14

## 2021-05-14 VITALS — SYSTOLIC BLOOD PRESSURE: 110 MMHG | DIASTOLIC BLOOD PRESSURE: 74 MMHG | BODY MASS INDEX: 21.42 KG/M2 | WEIGHT: 124.8 LBS

## 2021-05-14 DIAGNOSIS — Z12.4 CERVICAL CANCER SCREENING: ICD-10-CM

## 2021-05-14 DIAGNOSIS — Z01.419 WOMEN'S ANNUAL ROUTINE GYNECOLOGICAL EXAMINATION: Primary | ICD-10-CM

## 2021-05-14 PROBLEM — N89.8 VAGINAL DISCHARGE: Status: RESOLVED | Noted: 2020-09-15 | Resolved: 2021-05-14

## 2021-05-14 PROBLEM — R10.33 UMBILICAL PAIN: Status: RESOLVED | Noted: 2020-09-15 | Resolved: 2021-05-14

## 2021-05-14 PROBLEM — R10.10 UPPER ABDOMINAL PAIN: Status: RESOLVED | Noted: 2020-05-27 | Resolved: 2021-05-14

## 2021-05-14 PROCEDURE — 99395 PREV VISIT EST AGE 18-39: CPT | Performed by: OBSTETRICS & GYNECOLOGY

## 2021-05-14 NOTE — PROGRESS NOTES
[facility]  Gynecology    CC: Annual well woman exam    HPI  Toshia Barraza is a 26 y.o.  premenopausal female who presents for her annual well woman exam.  The patient has no complaints.  Denies any vaginal itching, burning, irritation, or discharge.  Denies any abnormal uterine bleeding.  Continues to be sexually active.  Denies any sexual dysfunction concerns.  Denies any urinary symptoms including incontinence, dysuria, frequency, urgency, nocturia.    She exercises regularly: yes.  She wears her seat belt:yes.  She has concerns about domestic violence: no.  She has noticed changes in height: no.    She is going to have a LN biopsy of a LN in her neck.    ROS  Review of Systems   Constitutional: Negative.    HENT: Negative.    Eyes: Negative.    Respiratory: Negative.    Cardiovascular: Negative.    Gastrointestinal: Negative.    Endocrine: Negative.    Genitourinary: Negative.    Musculoskeletal: Negative.    Skin: Negative.    Neurological: Negative.    Hematological: Negative.    Psychiatric/Behavioral: Negative.       HEALTH MAINTENANCE  Mammogram: N/A  Colonoscopy: N/A  DEXA scan: N/A  Pap smear: 2018    GYN HISTORY  Menarche: age 14  Menses: Regular, every 28 days, lasts 2-3 days, sometimes light/sometimes heavy, no intermenstrual bleeding  History of STIs: None per patient  Last pap smear: 2018  Abnormal pap smear history: None  Contraception: None    OB HISTORY  OB History    Para Term  AB Living   2 2 1 1   2   SAB TAB Ectopic Molar Multiple Live Births             2      # Outcome Date GA Lbr Hitesh/2nd Weight Sex Delivery Anes PTL Lv   2 Term 2016   3310 g (7 lb 4.8 oz)  Vag-Spont   ABHISHEK   1       Vag-Spont   ABHISHEK      Complications: Gastroschisis     PAST MEDICAL HISTORY  Past Medical History:   Diagnosis Date   • Distorted body image    • Esophagitis 2019   • GERD (gastroesophageal reflux disease)      PAST SURGICAL HISTORY  Past Surgical  History:   Procedure Laterality Date   • BREAST AUGMENTATION      saline implants   • BREAST BIOPSY     • COLONOSCOPY N/A 4/10/2019    Procedure: COLONOSCOPY;  Surgeon: Aidan Alvarado MD;  Location: [facility] ENDOSCOPY;  Service: Gastroenterology   • ENDOSCOPY N/A 4/10/2019    Procedure: ESOPHAGOGASTRODUODENOSCOPY Wed/Fri;  Surgeon: Aidan Alvarado MD;  Location: [facility] ENDOSCOPY;  Service: Gastroenterology   • ENDOSCOPY N/A 6/2/2020    Procedure: ESOPHAGOGASTRODUODENOSCOPY possible dilation;  Surgeon: Aidan Alvarado MD;  Location: [facility] ENDOSCOPY;  Service: Gastroenterology;  Laterality: N/A;   • ENDOSCOPY N/A 3/1/2021    Procedure: ESOPHAGOGASTRODUODENOSCOPY possible dilation;  Surgeon: Aidan Alvarado MD;  Location: [facility] ENDOSCOPY;  Service: Gastroenterology;  Laterality: N/A;     FAMILY HISTORY  Family History   Problem Relation Age of Onset   • Other Mother         hematologic disorder   • Heart disease Mother         ischemic heart disease   • Heart attack Mother    • Leukemia Mother    • Ovarian cancer Mother         questionable   • Heart disease Father    • Hypertension Father      SOCIAL HISTORY  Social History     Socioeconomic History   • Marital status:      Spouse name: Not on file   • Number of children: Not on file   • Years of education: Not on file   • Highest education level: Not on file   Tobacco Use   • Smoking status: Never Smoker   • Smokeless tobacco: Never Used   Vaping Use   • Vaping Use: Never used   Substance and Sexual Activity   • Alcohol use: Yes     Comment: social   • Drug use: No   • Sexual activity: Defer     HOME MEDICATIONS  Prior to Admission medications    Medication Sig Start Date End Date Taking? Authorizing Provider   albuterol sulfate  (90 Base) MCG/ACT inhaler Inhale 2 puffs Every 4 (Four) Hours As Needed for Wheezing or Shortness of Air. 4/13/21  Yes Ciera Chinchilla APRN   aspirin (aspirin) 81 MG EC tablet Take 1 tablet by mouth Daily. 1/12/21  Yes  "Ciera Chinchilla APRN   baclofen (LIORESAL) 10 MG tablet Take 1 tablet by mouth At Night As Needed for Muscle Spasms (muscular cramps/tension). For neck pain. 1/12/21  Yes Ciera Chinchilla APRN   butalbital-acetaminophen  MG tablet tablet Take 1 tablet by mouth Every 6 (Six) Hours As Needed (headache). 7/26/19  Yes Ciera Chinchilla APRN   cyanocobalamin 1000 MCG/ML injection Inject 1 mL into the appropriate muscle as directed by prescriber Every 28 (Twenty-Eight) Days. 9/11/20  Yes Ciera Chinchilla APRN   dexlansoprazole (DEXILANT) 60 MG capsule Take 1 capsule by mouth Daily for 180 days. 3/4/21 8/31/21 Yes Aaliyah Arrieta APRN   etodolac XL (LODINE XL) 400 MG 24 hr tablet Take 1 tablet by mouth Daily. Antiinflammatory for lower back pain 1/12/21  Yes Ciera Chinchilla APRN   famotidine (Pepcid) 20 MG tablet Take 1 tablet by mouth Every Night. 4/26/21  Yes Ciera Chinchilla APRN   gabapentin (NEURONTIN) 100 MG capsule Take 1-3 capsules up to three times per day for nerve pain as needed. 5/22/20  Yes Ciera Chinchilla APRN   haloperidol (HALDOL) 0.5 MG tablet Take 1/2-1 tid prn for anxiety 4/13/21  Yes Ciera Chinchilla APRN   propranolol (INDERAL) 10 MG tablet TAKE 1/2-1 TABLET BY MOUTH THREE TIMES DAILY AS NEEDED 4/13/21  Yes Ciera Chinchilla APRN   Scopolamine (TRANSDERM-SCOP) 1.5 MG/3DAYS patch Place 1 patch on the skin as directed by provider Every 72 (Seventy-Two) Hours. 4/13/21  Yes Ciera Chinchilla APRN   sucralfate (CARAFATE) 1 GM/10ML suspension SHAKE LIQUID AND TAKE 10 ML BY MOUTH FOUR TIMES DAILY AT BEDTIME WITH MEALS 3/8/21  Yes Aaliyah Arrieta APRN   Syringe/Needle, Disp, 25G X 5/8\" 3 ML misc 1 syringe Every 30 (Thirty) Days. 9/11/20  Yes Ciera Chnichilla APRN   azithromycin (Zithromax) 500 MG tablet Take 1 tablet by mouth Daily. 4/13/21 5/14/21  Ciera Chinchilla APRN   Calcium Carb-Cholecalciferol (CALCIUM-VITAMIN D) 500-200 MG-UNIT per tablet Take 1 tablet by mouth 2 " (Two) Times a Day With Meals. 9/11/20 5/14/21  Ciera Chinchilla APRN   ondansetron ODT (ZOFRAN-ODT) 4 MG disintegrating tablet Take 1 tablet by mouth Every 8 (Eight) Hours As Needed for Nausea or Vomiting for up to 12 doses. 11/15/19 5/14/21  Kishore Barrera MD     ALLERGIES  Allergies   Allergen Reactions   • Beef-Derived Products Other (See Comments)     Alpha-gal   • Pork-Derived Products Other (See Comments)     Alpha-gal     PE  /74 (BP Location: Left arm, Patient Position: Sitting, Cuff Size: Adult)   Wt 56.6 kg (124 lb 12.8 oz)   LMP 04/30/2021 (Within Days)   Breastfeeding No   BMI 21.42 kg/m²        General: Alert, healthy, no distress, well nourished and well developed.  Neurologic: Alert, oriented to person, place, and time.  Gait normal.  Cranial nerves II-XII grossly intact.  HEENT: Normocephalic, atraumatic.  Extraocular muscles intact, pupils equal and reactive times two.  3 mm enlarged submandibular LN on right side.  Otherwise no LAD.  Neck: Supple, no adenopathy, thyroid normal size, non-tender, without nodularity, trachea midline.  Breasts: No masses, skin dimpling, skin retraction, nipple discharge, or asymmetry bilaterally.  Saline implants palpated.  No axillary LAD.  Lungs: Normal respiratory effort.  Clear to auscultation bilaterally.  No wheezes, rhonci, or rales.  Heart: Regular rate and rhythm.  No murmer, rub or gallop.  Abdomen: Soft, non-tender, non-distended,no masses, no hepatosplenomegaly, no hernia.  Skin: No rash, no lesions.  Extremities: No cyanosis, clubbing or edema.  PELVIC EXAM:  External Genitalia/Vulva: Anatomy is normal, no significant redness of labia, no discharge on vulvar tissues, Maceo's and Bartholin's glands are normal, no ulcers, no condylomatous lesions.  Urethral meatus: Normal, no lesions, no prolapse.  Urethra: Normal, no masses, no tenderness with palpation.  Bladder: Normal, no fullness, no masses, no tenderness with palpation.  Vagina: Vaginal  tissues are not inflamed, normal color and texture, no significant discharge present.  Pelvic support adequate.  Cervix: Normal, no lesions, no purulent discharge, no cervical motion tenderness.  Uterus: Normal size, shape, and consistency.  Good mobility noted.  Minimal descent noted with good support.  Adnexa: Normal size and shape bilaterally, no palpable mass bilaterally and non-tender bilaterally.  Rectal: Normal, no masses or polyps, confirms bimanual exam, perianal normal, no lesions; ADÁN deferred.    IMPRESSION  Toshia Barraza is a 26 y.o.  presenting with annual gynecological exam.    PLAN    1. Women's annual routine gynecological examination  Counseled [facility], cervical cancer screening  RTC 1 year for annual    2. Cervical cancer screening  - Liquid-based Pap Smear, Screening    This document has been electronically signed by Rosaline Barrett MD on May 14, 2021 09:39 CDT.    CC: Ciera Chinchilla APRN

## 2021-05-19 LAB
LAB AP CASE REPORT: NORMAL
PATH INTERP SPEC-IMP: NORMAL

## 2021-06-16 PROBLEM — R41.3 MEMORY DEFICIT: Status: ACTIVE | Noted: 2021-06-16

## 2021-06-16 PROBLEM — E88.09 ALPHA GALACTOSIDASE DEFICIENCY: Status: ACTIVE | Noted: 2021-06-16

## 2021-06-16 PROBLEM — H53.8 BLURRY VISION: Status: ACTIVE | Noted: 2021-06-16

## 2021-06-16 PROBLEM — K21.9 GASTROESOPHAGEAL REFLUX DISEASE WITHOUT ESOPHAGITIS: Status: ACTIVE | Noted: 2021-06-16

## 2021-06-17 DIAGNOSIS — R59.1 LYMPHADENOPATHY: Primary | ICD-10-CM

## 2021-06-17 DIAGNOSIS — R59.9 SHOTTY LYMPH NODES: ICD-10-CM

## 2021-06-29 ENCOUNTER — LAB (OUTPATIENT)
Dept: ONCOLOGY | Facility: HOSPITAL | Age: 26
End: 2021-06-29

## 2021-06-29 ENCOUNTER — CONSULT (OUTPATIENT)
Dept: ONCOLOGY | Facility: CLINIC | Age: 26
End: 2021-06-29

## 2021-06-29 VITALS
HEIGHT: 64 IN | TEMPERATURE: 99.2 F | HEART RATE: 95 BPM | BODY MASS INDEX: 21.29 KG/M2 | DIASTOLIC BLOOD PRESSURE: 80 MMHG | SYSTOLIC BLOOD PRESSURE: 136 MMHG | RESPIRATION RATE: 16 BRPM | WEIGHT: 124.7 LBS

## 2021-06-29 DIAGNOSIS — R53.82 CHRONIC FATIGUE: ICD-10-CM

## 2021-06-29 DIAGNOSIS — R59.1 LYMPHADENOPATHY OF HEAD AND NECK: Primary | ICD-10-CM

## 2021-06-29 DIAGNOSIS — R59.1 LYMPHADENOPATHY OF HEAD AND NECK: ICD-10-CM

## 2021-06-29 LAB
BASOPHILS # BLD AUTO: 0.03 10*3/MM3 (ref 0–0.2)
BASOPHILS NFR BLD AUTO: 0.4 % (ref 0–1.5)
CRP SERPL-MCNC: <0.3 MG/DL (ref 0–0.5)
DEPRECATED RDW RBC AUTO: 36.8 FL (ref 37–54)
EOSINOPHIL # BLD AUTO: 0.11 10*3/MM3 (ref 0–0.4)
EOSINOPHIL NFR BLD AUTO: 1.4 % (ref 0.3–6.2)
ERYTHROCYTE [DISTWIDTH] IN BLOOD BY AUTOMATED COUNT: 11.5 % (ref 12.3–15.4)
ERYTHROCYTE [SEDIMENTATION RATE] IN BLOOD: 5 MM/HR (ref 0–20)
FERRITIN SERPL-MCNC: 40.78 NG/ML (ref 13–150)
HCT VFR BLD AUTO: 40 % (ref 34–46.6)
HGB BLD-MCNC: 13.7 G/DL (ref 12–15.9)
IMM GRANULOCYTES # BLD AUTO: 0.02 10*3/MM3 (ref 0–0.05)
IMM GRANULOCYTES NFR BLD AUTO: 0.3 % (ref 0–0.5)
IRON 24H UR-MRATE: 144 MCG/DL (ref 37–145)
IRON SATN MFR SERPL: 42 % (ref 20–50)
LDH SERPL-CCNC: 139 U/L (ref 135–214)
LYMPHOCYTES # BLD AUTO: 1.76 10*3/MM3 (ref 0.7–3.1)
LYMPHOCYTES NFR BLD AUTO: 22.3 % (ref 19.6–45.3)
MCH RBC QN AUTO: 29.8 PG (ref 26.6–33)
MCHC RBC AUTO-ENTMCNC: 34.3 G/DL (ref 31.5–35.7)
MCV RBC AUTO: 87.1 FL (ref 79–97)
MONOCYTES # BLD AUTO: 0.53 10*3/MM3 (ref 0.1–0.9)
MONOCYTES NFR BLD AUTO: 6.7 % (ref 5–12)
NEUTROPHILS NFR BLD AUTO: 5.44 10*3/MM3 (ref 1.7–7)
NEUTROPHILS NFR BLD AUTO: 68.9 % (ref 42.7–76)
NRBC BLD AUTO-RTO: 0 /100 WBC (ref 0–0.2)
PLATELET # BLD AUTO: 206 10*3/MM3 (ref 140–450)
PMV BLD AUTO: 11.7 FL (ref 6–12)
RBC # BLD AUTO: 4.59 10*6/MM3 (ref 3.77–5.28)
TIBC SERPL-MCNC: 343 MCG/DL (ref 298–536)
TRANSFERRIN SERPL-MCNC: 230 MG/DL (ref 200–360)
TSH SERPL DL<=0.05 MIU/L-ACNC: 2.18 UIU/ML (ref 0.27–4.2)
WBC # BLD AUTO: 7.89 10*3/MM3 (ref 3.4–10.8)

## 2021-06-29 PROCEDURE — 82728 ASSAY OF FERRITIN: CPT

## 2021-06-29 PROCEDURE — 99204 OFFICE O/P NEW MOD 45 MIN: CPT | Performed by: INTERNAL MEDICINE

## 2021-06-29 PROCEDURE — 85025 COMPLETE CBC W/AUTO DIFF WBC: CPT

## 2021-06-29 PROCEDURE — 82746 ASSAY OF FOLIC ACID SERUM: CPT

## 2021-06-29 PROCEDURE — 84466 ASSAY OF TRANSFERRIN: CPT

## 2021-06-29 PROCEDURE — 84443 ASSAY THYROID STIM HORMONE: CPT

## 2021-06-29 PROCEDURE — G0463 HOSPITAL OUTPT CLINIC VISIT: HCPCS | Performed by: INTERNAL MEDICINE

## 2021-06-29 PROCEDURE — 85651 RBC SED RATE NONAUTOMATED: CPT

## 2021-06-29 PROCEDURE — 86140 C-REACTIVE PROTEIN: CPT

## 2021-06-29 PROCEDURE — 83615 LACTATE (LD) (LDH) ENZYME: CPT

## 2021-06-29 PROCEDURE — 83540 ASSAY OF IRON: CPT

## 2021-06-30 ENCOUNTER — TELEPHONE (OUTPATIENT)
Dept: ONCOLOGY | Facility: CLINIC | Age: 26
End: 2021-06-30

## 2021-06-30 ENCOUNTER — TELEPHONE (OUTPATIENT)
Dept: ONCOLOGY | Facility: HOSPITAL | Age: 26
End: 2021-06-30

## 2021-06-30 LAB — FOLATE SERPL-MCNC: 8.29 NG/ML (ref 4.78–24.2)

## 2021-06-30 NOTE — TELEPHONE ENCOUNTER
----- Message from Sha Ortega MD sent at 6/30/2021  7:38 AM CDT -----  Iron level is normal.   Inflammatory marker normal.   Folic acid normal.   LDH (test for lymphoma) normal.   Thyroid test is normal.   Flow cytometry (lymphoma test) still pending.

## 2021-06-30 NOTE — PROGRESS NOTES
"    REASON FOR CONSULTATION: Lymphadenopathy  Provide an opinion on any further workup or treatment                             REQUESTING PHYSICIAN:  Ciera Chinchilla APRN      RECORDS OBTAINED:  Records of the patients history including those obtained from the referring provider were reviewed and summarized in detail.      History of Present Illness       Toshia Barraza was seen in consultation at the request of Ciera Chinchilla APRN for evaluation of lymphadenopathy.  Patient has past medical history of anxiety, panic disorder, gastroesophageal reflux disease.  Patient has been worked up for numerous symptoms over last 1 year including chest pain, palpitation, shortness of breath, fatigue, pressure behind her eyes, swelling in the neck.  Extensive work-up has been performed with negative results.  Patient continued to do poorly.  She fell couple of lymph nodes in the neck and subsequently ultrasound of her head and neck region was performed on March 22, 2021.This showed numerous less than 1 cm lymph nodes in the neck.  She has lost about 7 pounds over last few months.  No fever or chills.  No drenching night sweats.  No prior history of any autoimmune disease.  She does have positive family history of \" leukemia\" in her mother.  I been asked to assist with evaluation and management of her lymphadenopathy.          Past Medical History:   Diagnosis Date   • Distorted body image    • Esophagitis 4/28/2019   • GERD (gastroesophageal reflux disease)         Past Surgical History:   Procedure Laterality Date   • BREAST AUGMENTATION      saline implants   • BREAST BIOPSY     • COLONOSCOPY N/A 4/10/2019    Procedure: COLONOSCOPY;  Surgeon: Aidan Alvarado MD;  Location: [facility] ENDOSCOPY;  Service: Gastroenterology   • ENDOSCOPY N/A 4/10/2019    Procedure: ESOPHAGOGASTRODUODENOSCOPY Wed/Fri;  Surgeon: Aidan Alvarado MD;  Location: [facility] ENDOSCOPY;  Service: Gastroenterology   • ENDOSCOPY N/A 6/2/2020    " Procedure: ESOPHAGOGASTRODUODENOSCOPY possible dilation;  Surgeon: Aidan Alvarado MD;  Location: [facility] ENDOSCOPY;  Service: Gastroenterology;  Laterality: N/A;   • ENDOSCOPY N/A 3/1/2021    Procedure: ESOPHAGOGASTRODUODENOSCOPY possible dilation;  Surgeon: Aidan Alvarado MD;  Location: [facility] ENDOSCOPY;  Service: Gastroenterology;  Laterality: N/A;        Current Outpatient Medications on File Prior to Visit   Medication Sig Dispense Refill   • albuterol sulfate  (90 Base) MCG/ACT inhaler Inhale 2 puffs Every 4 (Four) Hours As Needed for Wheezing or Shortness of Air. 18 g 0   • aspirin (aspirin) 81 MG EC tablet Take 1 tablet by mouth Daily. 30 tablet 5   • baclofen (LIORESAL) 10 MG tablet Take 1 tablet by mouth At Night As Needed for Muscle Spasms (muscular cramps/tension). For neck pain. 30 tablet 0   • butalbital-acetaminophen  MG tablet tablet Take 1 tablet by mouth Every 6 (Six) Hours As Needed (headache). 60 each 0   • cyanocobalamin 1000 MCG/ML injection Inject 1 mL into the appropriate muscle as directed by prescriber Every 28 (Twenty-Eight) Days. 1 mL 5   • dexlansoprazole (DEXILANT) 60 MG capsule Take 1 capsule by mouth Daily for 180 days. 30 capsule 5   • etodolac XL (LODINE XL) 400 MG 24 hr tablet Take 1 tablet by mouth Daily. Antiinflammatory for lower back pain 30 tablet 0   • famotidine (Pepcid) 20 MG tablet Take 1 tablet by mouth Every Night. 30 tablet 5   • gabapentin (NEURONTIN) 100 MG capsule Take 1-3 capsules up to three times per day for nerve pain as needed. 60 capsule 0   • haloperidol (HALDOL) 0.5 MG tablet Take 1/2-1 tid prn for anxiety 90 tablet 0   • propranolol (INDERAL) 10 MG tablet TAKE 1/2-1 TABLET BY MOUTH THREE TIMES DAILY AS NEEDED 270 tablet 1   • Scopolamine (TRANSDERM-SCOP) 1.5 MG/3DAYS patch Place 1 patch on the skin as directed by provider Every 72 (Seventy-Two) Hours. 10 each 3   • sucralfate (CARAFATE) 1 GM/10ML suspension SHAKE LIQUID AND TAKE 10 ML BY MOUTH  "FOUR TIMES DAILY AT BEDTIME WITH MEALS 1260 mL 2   • Syringe/Needle, Disp, 25G X 5/8\" 3 ML misc 1 syringe Every 30 (Thirty) Days. 1 each 5     Current Facility-Administered Medications on File Prior to Visit   Medication Dose Route Frequency Provider Last Rate Last Admin   • cyanocobalamin injection 1,000 mcg  1,000 mcg Intramuscular Q30 Days Ciera Chinchilla APRN   1,000 mcg at 03/15/19 1640        ALLERGIES:    Allergies   Allergen Reactions   • Beef-Derived Products Other (See Comments)     Alpha-gal   • Pork-Derived Products Other (See Comments)     Alpha-gal        Social History     Socioeconomic History   • Marital status:      Spouse name: Not on file   • Number of children: Not on file   • Years of education: Not on file   • Highest education level: Not on file   Tobacco Use   • Smoking status: Never Smoker   • Smokeless tobacco: Never Used   Vaping Use   • Vaping Use: Never used   Substance and Sexual Activity   • Alcohol use: Yes     Comment: social   • Drug use: No   • Sexual activity: Defer        Family History   Problem Relation Age of Onset   • Other Mother         hematologic disorder   • Heart disease Mother         ischemic heart disease   • Heart attack Mother    • Leukemia Mother    • Ovarian cancer Mother         questionable   • Heart disease Father    • Hypertension Father             Objective     Vitals:    06/29/21 1553   BP: 136/80   Pulse: 95   Resp: 16   Temp: 99.2 °F (37.3 °C)   Weight: 56.6 kg (124 lb 11.2 oz)   Height: 162.6 cm (64\")   PainSc:   5   PainLoc: Neck     Current Status 6/29/2021   ECOG score 0       Physical Exam  Vitals and nursing note reviewed.   Constitutional:       Appearance: Normal appearance.   Neck:      Comments: Small less than 1 cm lymph nodes bilaterally in the neck, nontender, mobile  Cardiovascular:      Rate and Rhythm: Normal rate and regular rhythm.   Pulmonary:      Effort: Pulmonary effort is normal.      Breath sounds: Normal breath " sounds.   Abdominal:      General: Bowel sounds are normal. There is no distension.      Palpations: Abdomen is soft. There is no mass.      Tenderness: There is no abdominal tenderness.   Lymphadenopathy:      Cervical: Cervical adenopathy present.   Neurological:      Mental Status: She is alert.               RECENT LABS:Independently reviewed and summarized  Hematology WBC   Date Value Ref Range Status   06/29/2021 7.89 3.40 - 10.80 10*3/mm3 Final     RBC   Date Value Ref Range Status   06/29/2021 4.59 3.77 - 5.28 10*6/mm3 Final     Hemoglobin   Date Value Ref Range Status   06/29/2021 13.7 12.0 - 15.9 g/dL Final     Hematocrit   Date Value Ref Range Status   06/29/2021 40.0 34.0 - 46.6 % Final     Platelets   Date Value Ref Range Status   06/29/2021 206 140 - 450 10*3/mm3 Final        Lab Results   Component Value Date    GLUCOSE 90 08/21/2020    BUN 11 08/21/2020    CREATININE 0.67 08/21/2020    EGFRIFNONA 109 08/21/2020    BCR 16.4 08/21/2020    K 3.9 08/21/2020    CO2 29.0 08/21/2020    CALCIUM 9.3 08/21/2020    ALBUMIN 4.60 08/21/2020    AST 27 08/21/2020    ALT 21 08/21/2020       Imaging (independently reviewed and summarized):   Ultrasound head and neck soft tissue from March 22, 2021 reviewed.  This showed numerous less than 1 cm lymph nodes in the neck.    MRI brain with and without contrast from April 5, 2021 reviewed.  Showed no acute abnormalities.    Upper GI endoscopy from March 1, 2021 showed esophagitis and erythema in the stomach.  Biopsies were obtained.  Duodenal biopsies were normal.  Negative for celiac.    I have reviewed old records and summarized them in HPI as well as assessment and plan section of this note.     I reviewed notes from her primary care provider, gastroenterology notes and endoscopy results were also reviewed.    Toshia Barraza reports a pain score of 5.  Given her pain assessment as noted, treatment options were discussed and the following options were decided upon  as a follow-up plan to address the patient's pain: referral to Primary Care for assistance in pain treatment guidance.    Patient screened positive for depression based on a PHQ-9 score of 0 on 6/29/2021. Follow-up recommendations include: Suicide Risk Assessment performed.      Diagnosis:   (1) Lymphadenopathy   (2) Fatigue     All are new diagnosis/problems for me.     Assessment/Plan     (1) Lymphadenopathy     Patient is experiencing numerous symptoms including pressure behind her eyes, difficulty swallowing, chest pain, palpitation, fatigue, shortness of breath.  The etiology of her numerous symptoms remains somewhat unclear even after extensive work-up.  She fell couple of small lymph nodes in the neck and subsequently had ultrasound head and neck performed on March 22, 2021 which showed numerous less than 1 cm lymph nodes in her neck.  I reviewed images personally.  She does appear to have small cervical lymphadenopathy however all the lymph node appears to be less than 1 cm.  I will check CBC, LDH, flow cytometry to rule out underlying lymphoproliferative disorder.  Discussed with patient that likelihood of underlying lymphoma is low however without biopsy I cannot rule out this possibility with certainty.  Does not appear that the lymph nodes have increased in size since last ultrasound 3 months ago.  I will try to attempt FNA of lymph node in the neck to rule out underlying lymphoproliferative disorder.  Risk versus benefit of biopsy was discussed.  She was in agreement.  I will consult IR for ultrasound-guided FNA.    (2) Fatigue   Etiology unclear.  I will check ferritin, iron profile, folic acid level to rule out nutritional causes.  I will check TSH to rule out underlying endocrine disorder.  I will check ESR, CRP to rule out underlying inflammatory etiology.    Discussed with patient and her mother at length.

## 2021-06-30 NOTE — PROGRESS NOTES
Iron level is normal.   Inflammatory marker normal.   Folic acid normal.   LDH (test for lymphoma) normal.   Thyroid test is normal.   Flow cytometry (lymphoma test) still pending.

## 2021-07-01 ENCOUNTER — HOSPITAL ENCOUNTER (OUTPATIENT)
Dept: ULTRASOUND IMAGING | Facility: HOSPITAL | Age: 26
Discharge: HOME | End: 2021-07-01
Admitting: INTERNAL MEDICINE

## 2021-07-01 VITALS
DIASTOLIC BLOOD PRESSURE: 86 MMHG | OXYGEN SATURATION: 100 % | TEMPERATURE: 98.6 F | HEART RATE: 98 BPM | SYSTOLIC BLOOD PRESSURE: 122 MMHG | RESPIRATION RATE: 18 BRPM

## 2021-07-01 DIAGNOSIS — R59.1 LYMPHADENOPATHY OF HEAD AND NECK: ICD-10-CM

## 2021-07-01 LAB — REF LAB TEST METHOD: NORMAL

## 2021-07-01 NOTE — POST-PROCEDURE NOTE
PROCEDURE: US FINE NEEDLE ASPIRATION WITH IMAGING GUIDANCE  COMPARISON: Ultrasound of the head and neck dated 3/22/2021  HISTORY: Right neck adenopathy  TECHNIQUE/FINDINGS:  The risks, benefits and alternatives were explained to the  patient who had ample opportunity to ask questions. The patient  agreed to proceed and informed consent was obtained.  Preliminary ultrasound scans of the neck demonstrated an  appropriate window for needle introduction into the largest lymph  node in the right subarticular region. An appropriate site was  marked then prepped and draped in sterile fashion. Approximately  3 mL of 2% lidocaine solution was used for local superficial and  deep anesthesia.  Four fine needle aspiration attempts were performed via a  25-gauge needle under continuous sonographic guidance. Samples  were given directly to the cytotechnologist.  There were no immediate post procedure complications.  Blood loss: none.    CONCLUSION:     Successful ultrasound-guided fine needle aspiration.

## 2021-07-02 ENCOUNTER — TELEPHONE (OUTPATIENT)
Dept: ONCOLOGY | Facility: CLINIC | Age: 26
End: 2021-07-02

## 2021-07-07 ENCOUNTER — TELEPHONE (OUTPATIENT)
Dept: ONCOLOGY | Facility: CLINIC | Age: 26
End: 2021-07-07

## 2021-07-07 LAB
LAB AP CASE REPORT: NORMAL
PATH REPORT.FINAL DX SPEC: NORMAL

## 2021-07-07 NOTE — TELEPHONE ENCOUNTER
Called and spoke with pt regarding tomorrows appt, we have all needed results back.  V/u obtained.

## 2021-07-08 ENCOUNTER — OFFICE VISIT (OUTPATIENT)
Dept: ONCOLOGY | Facility: CLINIC | Age: 26
End: 2021-07-08

## 2021-07-08 VITALS
HEIGHT: 64 IN | DIASTOLIC BLOOD PRESSURE: 55 MMHG | RESPIRATION RATE: 18 BRPM | SYSTOLIC BLOOD PRESSURE: 146 MMHG | HEART RATE: 70 BPM | TEMPERATURE: 98.3 F | BODY MASS INDEX: 21.51 KG/M2 | WEIGHT: 126 LBS

## 2021-07-08 DIAGNOSIS — R59.1 LYMPHADENOPATHY OF HEAD AND NECK: ICD-10-CM

## 2021-07-08 PROCEDURE — G0463 HOSPITAL OUTPT CLINIC VISIT: HCPCS | Performed by: INTERNAL MEDICINE

## 2021-07-08 PROCEDURE — 99212 OFFICE O/P EST SF 10 MIN: CPT | Performed by: INTERNAL MEDICINE

## 2021-07-08 NOTE — PROGRESS NOTES
"Chief Complaint  Follow-up - lymphadenopathy     Subjective          Toshia Barraza presents to [facility] HEMATOLOGY [facility] ONCOLOGY  History of Present Illness     Toshia Barraza was seen in follow up for lymphadenopathy.   We reviewed the results of labs and FNA of lymph node which came back negative for any malignant process.  Reassurance given to the patient and family.  They were instructed to call us if any new questions or concerns arise.    Objective   Vital Signs:   /55   Pulse 70   Temp 98.3 °F (36.8 °C) (Temporal)   Resp 18   Ht 162.6 cm (64.02\")   Wt 57.2 kg (126 lb)   BMI 21.62 kg/m²     Physical Exam  Vitals and nursing note reviewed.   Constitutional:       Appearance: Normal appearance.   Neurological:      General: No focal deficit present.      Mental Status: She is alert and oriented to person, place, and time.   Psychiatric:         Mood and Affect: Mood normal.         Behavior: Behavior normal.         Thought Content: Thought content normal.        Result Review :   The following data was reviewed by: Sha Ortega MD on 07/08/2021:  Common labs    Common Labsle 8/21/20 8/21/20 6/29/21    0735 0735    Glucose  90    BUN  11    Creatinine  0.67    eGFR Non African Am  109    Sodium  138    Potassium  3.9    Chloride  101    Calcium  9.3    Albumin  4.60    Total Bilirubin  0.7    Alkaline Phosphatase  62    AST (SGOT)  27    ALT (SGPT)  21    WBC 4.46  7.89   Hemoglobin 13.9  13.7   Hematocrit 40.7  40.0   Platelets 186  206           CMP    CMP 8/21/20   Glucose 90   BUN 11   Creatinine 0.67   eGFR Non African Am 109   Sodium 138   Potassium 3.9   Chloride 101   Calcium 9.3   Albumin 4.60   Total Bilirubin 0.7   Alkaline Phosphatase 62   AST (SGOT) 27   ALT (SGPT) 21           CBC    CBC 8/21/20 6/29/21   WBC 4.46 7.89   RBC 4.54 4.59   Hemoglobin 13.9 13.7   Hematocrit 40.7 40.0   MCV 89.6 87.1   MCH 30.6 29.8   MCHC 34.2 34.3   RDW 12.1 (A) " 11.5 (A)   Platelets 186 206   (A) Abnormal value            CBC w/diff    CBC w/Diff 8/21/20 6/29/21   WBC 4.46 7.89   RBC 4.54 4.59   Hemoglobin 13.9 13.7   Hematocrit 40.7 40.0   MCV 89.6 87.1   MCH 30.6 29.8   MCHC 34.2 34.3   RDW 12.1 (A) 11.5 (A)   Platelets 186 206   Neutrophil Rel % 53.5 68.9   Immature Granulocyte Rel %  0.3   Lymphocyte Rel % 36.1 22.3   Monocyte Rel % 7.2 6.7   Eosinophil Rel % 2.5 1.4   Basophil Rel % 0.7 0.4   (A) Abnormal value                     Pathology:     Left neck FNA lymph node negative for malignant cells         Assessment and Plan    Diagnoses and all orders for this visit:    1. Lymphadenopathy of head and neck    Patient with small less than 1 cm lymph node bilaterally in the neck region.  Patient has multiple nonspecific complaints unrelated to lymphadenopathy.  Even after extensive counseling patient wanted a biopsy and FNA was performed which came back negative for malignant cells.  I believe her lymphadenopathy is not pathologic in nature.  I have instructed her to call us if she develops worsening lymphadenopathy in which case we will consider excisional lymph node biopsy.  Overall my suspicion for hematological disorder is extremely low.  Extensive work-up has already been performed and has come back negative.        Follow Up   No follow-ups on file.  Patient was given instructions and counseling regarding her condition or for health maintenance advice. Please see specific information pulled into the AVS if appropriate.

## 2021-07-22 ENCOUNTER — OFFICE VISIT (OUTPATIENT)
Dept: FAMILY MEDICINE CLINIC | Facility: CLINIC | Age: 26
End: 2021-07-22

## 2021-07-22 DIAGNOSIS — R06.09 DOE (DYSPNEA ON EXERTION): ICD-10-CM

## 2021-07-22 DIAGNOSIS — R93.89 ABNORMAL CHEST X-RAY: ICD-10-CM

## 2021-07-22 DIAGNOSIS — Z91.018 MULTIPLE FOOD ALLERGIES: Primary | ICD-10-CM

## 2021-07-22 DIAGNOSIS — T43.615A ADVERSE EFFECT OF CAFFEINE, INITIAL ENCOUNTER: ICD-10-CM

## 2021-07-22 DIAGNOSIS — R42 DIZZINESS: ICD-10-CM

## 2021-07-22 DIAGNOSIS — K21.00 GASTROESOPHAGEAL REFLUX DISEASE WITH ESOPHAGITIS WITHOUT HEMORRHAGE: ICD-10-CM

## 2021-07-22 DIAGNOSIS — E88.09 ALPHA GALACTOSIDASE DEFICIENCY: ICD-10-CM

## 2021-07-22 DIAGNOSIS — R22.1 NECK SWELLING: ICD-10-CM

## 2021-07-22 DIAGNOSIS — R44.8 FACIAL PRESSURE: ICD-10-CM

## 2021-07-22 PROCEDURE — 99214 OFFICE O/P EST MOD 30 MIN: CPT | Performed by: NURSE PRACTITIONER

## 2021-07-22 RX ORDER — MECLIZINE HCL 12.5 MG/1
12.5 TABLET ORAL 3 TIMES DAILY PRN
Qty: 60 TABLET | Refills: 5 | Status: SHIPPED | OUTPATIENT
Start: 2021-07-22 | End: 2021-08-18

## 2021-07-22 RX ORDER — FAMOTIDINE 20 MG/1
20 TABLET, FILM COATED ORAL NIGHTLY
Qty: 30 TABLET | Refills: 5 | Status: SHIPPED | OUTPATIENT
Start: 2021-07-22 | End: 2022-03-21

## 2021-07-22 RX ORDER — HYDROXYZINE HYDROCHLORIDE 25 MG/1
25 TABLET, FILM COATED ORAL 3 TIMES DAILY PRN
Qty: 60 TABLET | Refills: 5 | Status: SHIPPED | OUTPATIENT
Start: 2021-07-22 | End: 2021-11-08

## 2021-08-09 DIAGNOSIS — Z91.018 MULTIPLE FOOD ALLERGIES: Primary | ICD-10-CM

## 2021-08-09 DIAGNOSIS — E88.09 ALPHA GALACTOSIDASE DEFICIENCY: ICD-10-CM

## 2021-08-09 DIAGNOSIS — R22.1 NECK SWELLING: ICD-10-CM

## 2021-08-09 DIAGNOSIS — T43.615A ADVERSE EFFECT OF CAFFEINE, INITIAL ENCOUNTER: ICD-10-CM

## 2021-08-09 DIAGNOSIS — R44.8 FACIAL PRESSURE: ICD-10-CM

## 2021-08-10 ENCOUNTER — LAB (OUTPATIENT)
Dept: LAB | Facility: OTHER | Age: 26
End: 2021-08-10

## 2021-08-10 DIAGNOSIS — E88.09 ALPHA GALACTOSIDASE DEFICIENCY: ICD-10-CM

## 2021-08-10 DIAGNOSIS — T43.615A ADVERSE EFFECT OF CAFFEINE, INITIAL ENCOUNTER: ICD-10-CM

## 2021-08-10 DIAGNOSIS — R22.1 NECK SWELLING: ICD-10-CM

## 2021-08-10 DIAGNOSIS — R44.8 FACIAL PRESSURE: ICD-10-CM

## 2021-08-10 DIAGNOSIS — Z91.018 MULTIPLE FOOD ALLERGIES: ICD-10-CM

## 2021-08-10 PROCEDURE — 86003 ALLG SPEC IGE CRUDE XTRC EA: CPT | Performed by: NURSE PRACTITIONER

## 2021-08-10 PROCEDURE — 84439 ASSAY OF FREE THYROXINE: CPT | Performed by: NURSE PRACTITIONER

## 2021-08-10 PROCEDURE — 86008 ALLG SPEC IGE RECOMB EA: CPT | Performed by: NURSE PRACTITIONER

## 2021-08-10 PROCEDURE — 84443 ASSAY THYROID STIM HORMONE: CPT | Performed by: NURSE PRACTITIONER

## 2021-08-10 PROCEDURE — 36415 COLL VENOUS BLD VENIPUNCTURE: CPT | Performed by: NURSE PRACTITIONER

## 2021-08-11 LAB
T4 FREE SERPL-MCNC: 1.18 NG/DL (ref 0.93–1.7)
TSH SERPL DL<=0.05 MIU/L-ACNC: 2.65 UIU/ML (ref 0.27–4.2)

## 2021-08-14 LAB
A-LACTALB IGE QN: <0.1 KU/L
ALMOND IGE QN: <0.1 KU/L
ALMOND IGE QN: <0.1 KU/L
APPLE IGE QN: <0.1 KU/L
AVOCADO IGE QN: <0.1 KU/L
BAKER'S YEAST IGE QN: <0.1 KU/L
BANANA IGE QN: <0.1 KU/L
BEEF IGE QN: 0.18 KU/L
BLACK PEPPER IGE QN: <0.1 KU/L
BRAZIL NUT IGE QN: <0.1 KU/L
BROCCOLI IGE QN: <0.1 KU/L
CABBAGE IGE QN: <0.1 KU/L
CARROT IGE QN: <0.1 KU/L
CASHEW NUT IGE QN: <0.1 KU/L
CASHEW NUT IGE QN: <0.1 KU/L
CHEESE MOLD IGE QN: <0.1 KU/L
CHESTNUT IGE QN: <0.1 KU/L
CHICKEN MEAT IGE QN: <0.1 KU/L
CHILI PEPPER IGE QN: <0.1 KU/L
COCOA IGE QN: <0.1 KU/L
CONV CLASS DESCRIPTION: ABNORMAL
CONV CLASS DESCRIPTION: NORMAL
CORN IGE QN: <0.1 KU/L
COW MILK IGE QN: 0.12 KU/L
CRAB IGE QN: <0.1 KU/L
EGG WHITE IGE QN: <0.1 KU/L
EGG YOLK IGE QN: <0.1 KU/L
GARLIC IGE QN: <0.1 KU/L
GRAPE IGE QN: <0.1 KU/L
GREEN BEAN IGE QN: <0.1 KU/L
GREEN COFFEE BEAN IGE QN: <0.1 KU/L
HADDOCK IGE QN: <0.1 KU/L
HALIBUT IGE QN: <0.1 KU/L
HAZELNUT IGE QN: <0.1 KU/L
HAZELNUT IGE QN: <0.1 KU/L
LAMB IGE QN: <0.1 KU/L
LEMON IGE QN: <0.1 KU/L
MELON IGE QN: <0.1 KU/L
MUSHROOM IGE QN: <0.1 KU/L
OAT IGE QN: <0.1 KU/L
ONION IGE QN: <0.1 KU/L
ORANGE IGE QN: <0.1 KU/L
OYSTER IGE QN: <0.1 KU/L
PEANUT IGE QN: <0.1 KU/L
PEANUT IGE QN: <0.1 KU/L
PECAN/HICK NUT IGE QN: <0.1 KU/L
PINE NUT IGE QN: <0.1 KU/L
PISTACHIO IGE QN: <0.1 KU/L
PORK IGE QN: 0.12 KU/L
POTATO IGE QN: <0.1 KU/L
RICE IGE QN: <0.1 KU/L
RYE IGE QN: <0.1 KU/L
SALMON IGE QN: <0.1 KU/L
SESAME SEED IGE QN: <0.1 KU/L
SHRIMP IGE QN: <0.1 KU/L
SOYBEAN IGE QN: <0.1 KU/L
STRAWBERRY IGE QN: <0.1 KU/L
TEA IGE QN: <0.1 KU/L
TOMATO IGE QN: <0.1 KU/L
TUNA IGE QN: <0.1 KU/L
TURKEY MEAT IGE QN: <0.1 KU/L
VANILLA IGE QN: <0.1 KU/L
WALNUT IGE QN: <0.1 KU/L
WHEAT IGE QN: <0.1 KU/L
WHOLE EGG IGE QN: <0.1 KU/L

## 2021-08-17 LAB
ALPHA-GAL IGE QN: 0.22 KU/L
BEEF IGE QN: 0.14 KU/L
DEPRECATED BEEF IGE RAST QL: ABNORMAL
DEPRECATED LAMB IGE RAST QL: 0
DEPRECATED PORK IGE RAST QL: ABNORMAL
LAMB IGE QN: <0.1 KU/L
PORK IGE QN: 0.14 KU/L

## 2021-08-18 ENCOUNTER — LAB (OUTPATIENT)
Dept: LAB | Facility: OTHER | Age: 26
End: 2021-08-18

## 2021-08-18 ENCOUNTER — OFFICE VISIT (OUTPATIENT)
Dept: FAMILY MEDICINE CLINIC | Facility: CLINIC | Age: 26
End: 2021-08-18

## 2021-08-18 VITALS
HEIGHT: 64 IN | TEMPERATURE: 98.1 F | SYSTOLIC BLOOD PRESSURE: 102 MMHG | HEART RATE: 82 BPM | DIASTOLIC BLOOD PRESSURE: 68 MMHG | WEIGHT: 123.6 LBS | OXYGEN SATURATION: 97 % | BODY MASS INDEX: 21.1 KG/M2

## 2021-08-18 DIAGNOSIS — R10.12 LEFT UPPER QUADRANT ABDOMINAL PAIN: ICD-10-CM

## 2021-08-18 DIAGNOSIS — Z76.89 ENCOUNTER TO ESTABLISH CARE: Primary | ICD-10-CM

## 2021-08-18 DIAGNOSIS — Z91.018 ALLERGY TO ALPHA-GAL: ICD-10-CM

## 2021-08-18 DIAGNOSIS — R53.83 FATIGUE, UNSPECIFIED TYPE: ICD-10-CM

## 2021-08-18 DIAGNOSIS — K21.9 GASTROESOPHAGEAL REFLUX DISEASE WITHOUT ESOPHAGITIS: ICD-10-CM

## 2021-08-18 DIAGNOSIS — M54.2 NECK PAIN ON LEFT SIDE: ICD-10-CM

## 2021-08-18 LAB
ALBUMIN SERPL-MCNC: 4.6 G/DL (ref 3.5–5)
ALBUMIN/GLOB SERPL: 1.8 G/DL (ref 1.1–1.8)
ALP SERPL-CCNC: 49 U/L (ref 38–126)
ALT SERPL W P-5'-P-CCNC: 12 U/L
AMYLASE SERPL-CCNC: 64 U/L (ref 30–110)
ANION GAP SERPL CALCULATED.3IONS-SCNC: 5 MMOL/L (ref 5–15)
AST SERPL-CCNC: 18 U/L (ref 14–36)
BASOPHILS # BLD AUTO: 0.03 10*3/MM3 (ref 0–0.2)
BASOPHILS NFR BLD AUTO: 0.6 % (ref 0–1.5)
BILIRUB SERPL-MCNC: 0.5 MG/DL (ref 0.2–1.3)
BUN SERPL-MCNC: 12 MG/DL (ref 7–23)
BUN/CREAT SERPL: 17.4 (ref 7–25)
CALCIUM SPEC-SCNC: 9.4 MG/DL (ref 8.4–10.2)
CHLORIDE SERPL-SCNC: 105 MMOL/L (ref 101–112)
CO2 SERPL-SCNC: 28 MMOL/L (ref 22–30)
CREAT SERPL-MCNC: 0.69 MG/DL (ref 0.52–1.04)
DEPRECATED RDW RBC AUTO: 38.2 FL (ref 37–54)
EOSINOPHIL # BLD AUTO: 0.03 10*3/MM3 (ref 0–0.4)
EOSINOPHIL NFR BLD AUTO: 0.6 % (ref 0.3–6.2)
ERYTHROCYTE [DISTWIDTH] IN BLOOD BY AUTOMATED COUNT: 12 % (ref 12.3–15.4)
GFR SERPL CREATININE-BSD FRML MDRD: 105 ML/MIN/1.73 (ref 71–165)
GLOBULIN UR ELPH-MCNC: 2.5 GM/DL (ref 2.3–3.5)
GLUCOSE SERPL-MCNC: 85 MG/DL (ref 70–99)
HCT VFR BLD AUTO: 36.8 % (ref 34–46.6)
HGB BLD-MCNC: 13 G/DL (ref 12–15.9)
LYMPHOCYTES # BLD AUTO: 1.82 10*3/MM3 (ref 0.7–3.1)
LYMPHOCYTES NFR BLD AUTO: 34.1 % (ref 19.6–45.3)
MCH RBC QN AUTO: 31.4 PG (ref 26.6–33)
MCHC RBC AUTO-ENTMCNC: 35.3 G/DL (ref 31.5–35.7)
MCV RBC AUTO: 88.9 FL (ref 79–97)
MONOCYTES # BLD AUTO: 0.36 10*3/MM3 (ref 0.1–0.9)
MONOCYTES NFR BLD AUTO: 6.8 % (ref 5–12)
NEUTROPHILS NFR BLD AUTO: 3.09 10*3/MM3 (ref 1.7–7)
NEUTROPHILS NFR BLD AUTO: 57.9 % (ref 42.7–76)
PLATELET # BLD AUTO: 186 10*3/MM3 (ref 140–450)
PMV BLD AUTO: 11.8 FL (ref 6–12)
POTASSIUM SERPL-SCNC: 4.1 MMOL/L (ref 3.4–5)
PROT SERPL-MCNC: 7.1 G/DL (ref 6.3–8.6)
RBC # BLD AUTO: 4.14 10*6/MM3 (ref 3.77–5.28)
SODIUM SERPL-SCNC: 138 MMOL/L (ref 137–145)
WBC # BLD AUTO: 5.33 10*3/MM3 (ref 3.4–10.8)

## 2021-08-18 PROCEDURE — 84466 ASSAY OF TRANSFERRIN: CPT | Performed by: NURSE PRACTITIONER

## 2021-08-18 PROCEDURE — 83690 ASSAY OF LIPASE: CPT | Performed by: NURSE PRACTITIONER

## 2021-08-18 PROCEDURE — 86677 HELICOBACTER PYLORI ANTIBODY: CPT | Performed by: NURSE PRACTITIONER

## 2021-08-18 PROCEDURE — 82746 ASSAY OF FOLIC ACID SERUM: CPT | Performed by: NURSE PRACTITIONER

## 2021-08-18 PROCEDURE — 36415 COLL VENOUS BLD VENIPUNCTURE: CPT | Performed by: NURSE PRACTITIONER

## 2021-08-18 PROCEDURE — 82150 ASSAY OF AMYLASE: CPT | Performed by: NURSE PRACTITIONER

## 2021-08-18 PROCEDURE — 85025 COMPLETE CBC W/AUTO DIFF WBC: CPT | Performed by: NURSE PRACTITIONER

## 2021-08-18 PROCEDURE — 82607 VITAMIN B-12: CPT | Performed by: NURSE PRACTITIONER

## 2021-08-18 PROCEDURE — 82728 ASSAY OF FERRITIN: CPT | Performed by: NURSE PRACTITIONER

## 2021-08-18 PROCEDURE — 86665 EPSTEIN-BARR CAPSID VCA: CPT | Performed by: NURSE PRACTITIONER

## 2021-08-18 PROCEDURE — 99214 OFFICE O/P EST MOD 30 MIN: CPT | Performed by: NURSE PRACTITIONER

## 2021-08-18 PROCEDURE — 83540 ASSAY OF IRON: CPT | Performed by: NURSE PRACTITIONER

## 2021-08-18 PROCEDURE — 80053 COMPREHEN METABOLIC PANEL: CPT | Performed by: NURSE PRACTITIONER

## 2021-08-18 RX ORDER — DEXLANSOPRAZOLE 60 MG/1
60 CAPSULE, DELAYED RELEASE ORAL DAILY
Qty: 30 CAPSULE | Refills: 5 | Status: SHIPPED | OUTPATIENT
Start: 2021-08-18 | End: 2022-02-14

## 2021-08-18 RX ORDER — BACLOFEN 10 MG/1
10 TABLET ORAL NIGHTLY PRN
Qty: 30 TABLET | Refills: 0 | Status: SHIPPED | OUTPATIENT
Start: 2021-08-18 | End: 2021-09-20

## 2021-08-18 RX ORDER — PROPRANOLOL HYDROCHLORIDE 40 MG/1
40 TABLET ORAL DAILY
Qty: 30 TABLET | Refills: 5 | Status: SHIPPED | OUTPATIENT
Start: 2021-08-18
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
## 2021-08-19 LAB
FERRITIN SERPL-MCNC: 33 NG/ML (ref 13–150)
FOLATE SERPL-MCNC: 7.86 NG/ML (ref 4.78–24.2)
IRON 24H UR-MRATE: 151 MCG/DL (ref 37–145)
IRON SATN MFR SERPL: 44 % (ref 20–50)
LIPASE SERPL-CCNC: 18 U/L (ref 13–60)
TIBC SERPL-MCNC: 344 MCG/DL (ref 298–536)
TRANSFERRIN SERPL-MCNC: 231 MG/DL (ref 200–360)
VIT B12 BLD-MCNC: 387 PG/ML (ref 211–946)

## 2021-08-20 ENCOUNTER — PATIENT ROUNDING (BHMG ONLY) (OUTPATIENT)
Dept: FAMILY MEDICINE CLINIC | Facility: CLINIC | Age: 26
End: 2021-08-20

## 2021-08-20 LAB
EBV VCA IGG SER IA-ACNC: 39.1 U/ML (ref 0–17.9)
EBV VCA IGM SER IA-ACNC: <36 U/ML (ref 0–35.9)
H PYLORI IGA SER-ACNC: <9 UNITS (ref 0–8.9)
H PYLORI IGG SER IA-ACNC: 0.29 INDEX VALUE (ref 0–0.79)
H PYLORI IGM SER-ACNC: <9 UNITS (ref 0–8.9)

## 2021-08-23 ENCOUNTER — CLINICAL SUPPORT (OUTPATIENT)
Dept: FAMILY MEDICINE CLINIC | Facility: CLINIC | Age: 26
End: 2021-08-23

## 2021-08-23 DIAGNOSIS — E53.8 VITAMIN B12 DEFICIENCY: Primary | ICD-10-CM

## 2021-08-23 PROCEDURE — 96372 THER/PROPH/DIAG INJ SC/IM: CPT | Performed by: NURSE PRACTITIONER

## 2021-08-23 RX ORDER — CYANOCOBALAMIN 1000 UG/ML
1000 INJECTION, SOLUTION INTRAMUSCULAR; SUBCUTANEOUS
Status: SHIPPED | OUTPATIENT
Start: 2021-08-23

## 2021-08-23 RX ADMIN — CYANOCOBALAMIN 1000 MCG: 1000 INJECTION, SOLUTION INTRAMUSCULAR; SUBCUTANEOUS at 10:05

## 2021-09-07 ENCOUNTER — CLINICAL SUPPORT (OUTPATIENT)
Dept: FAMILY MEDICINE CLINIC | Facility: CLINIC | Age: 26
End: 2021-09-07

## 2021-09-07 DIAGNOSIS — E53.8 VITAMIN B12 DEFICIENCY: ICD-10-CM

## 2021-09-07 PROCEDURE — 96372 THER/PROPH/DIAG INJ SC/IM: CPT | Performed by: NURSE PRACTITIONER

## 2021-09-07 RX ADMIN — CYANOCOBALAMIN 1000 MCG: 1000 INJECTION, SOLUTION INTRAMUSCULAR; SUBCUTANEOUS at 11:45

## 2021-09-20 DIAGNOSIS — M54.2 NECK PAIN ON LEFT SIDE: ICD-10-CM

## 2021-09-20 RX ORDER — BACLOFEN 10 MG/1
TABLET ORAL
Qty: 30 TABLET | Refills: 0 | Status: SHIPPED | OUTPATIENT
Start: 2021-09-20 | End: 2021-10-18

## 2021-09-23 ENCOUNTER — LAB (OUTPATIENT)
Dept: LAB | Facility: OTHER | Age: 26
End: 2021-09-23

## 2021-09-23 ENCOUNTER — OFFICE VISIT (OUTPATIENT)
Dept: OBSTETRICS AND GYNECOLOGY | Facility: CLINIC | Age: 26
End: 2021-09-23

## 2021-09-23 VITALS
HEIGHT: 64 IN | BODY MASS INDEX: 21.17 KG/M2 | WEIGHT: 124 LBS | SYSTOLIC BLOOD PRESSURE: 100 MMHG | DIASTOLIC BLOOD PRESSURE: 64 MMHG

## 2021-09-23 DIAGNOSIS — R10.2 PELVIC PRESSURE IN FEMALE: ICD-10-CM

## 2021-09-23 DIAGNOSIS — N89.8 VAGINAL DISCHARGE: ICD-10-CM

## 2021-09-23 DIAGNOSIS — R10.2 PELVIC PRESSURE IN FEMALE: Primary | ICD-10-CM

## 2021-09-23 DIAGNOSIS — Z11.3 SCREENING FOR STD (SEXUALLY TRANSMITTED DISEASE): ICD-10-CM

## 2021-09-23 LAB
BILIRUB UR QL STRIP: NEGATIVE
CANDIDA ALBICANS: NEGATIVE
CLARITY UR: CLEAR
COLOR UR: YELLOW
GARDNERELLA VAGINALIS: NEGATIVE
GLUCOSE UR STRIP-MCNC: NEGATIVE MG/DL
HGB UR QL STRIP.AUTO: NEGATIVE
KETONES UR QL STRIP: ABNORMAL
LEUKOCYTE ESTERASE UR QL STRIP.AUTO: NEGATIVE
NITRITE UR QL STRIP: NEGATIVE
PH UR STRIP.AUTO: 6.5 [PH] (ref 5.5–8)
PROT UR QL STRIP: NEGATIVE
SP GR UR STRIP: 1.02 (ref 1–1.03)
T VAGINALIS DNA VAG QL PROBE+SIG AMP: NEGATIVE
UROBILINOGEN UR QL STRIP: ABNORMAL

## 2021-09-23 PROCEDURE — G0432 EIA HIV-1/HIV-2 SCREEN: HCPCS | Performed by: OBSTETRICS & GYNECOLOGY

## 2021-09-23 PROCEDURE — 87660 TRICHOMONAS VAGIN DIR PROBE: CPT | Performed by: OBSTETRICS & GYNECOLOGY

## 2021-09-23 PROCEDURE — 36415 COLL VENOUS BLD VENIPUNCTURE: CPT | Performed by: OBSTETRICS & GYNECOLOGY

## 2021-09-23 PROCEDURE — 87480 CANDIDA DNA DIR PROBE: CPT | Performed by: OBSTETRICS & GYNECOLOGY

## 2021-09-23 PROCEDURE — 86696 HERPES SIMPLEX TYPE 2 TEST: CPT | Performed by: OBSTETRICS & GYNECOLOGY

## 2021-09-23 PROCEDURE — 87510 GARDNER VAG DNA DIR PROBE: CPT | Performed by: OBSTETRICS & GYNECOLOGY

## 2021-09-23 PROCEDURE — 86694 HERPES SIMPLEX NES ANTBDY: CPT | Performed by: OBSTETRICS & GYNECOLOGY

## 2021-09-23 PROCEDURE — 86803 HEPATITIS C AB TEST: CPT | Performed by: OBSTETRICS & GYNECOLOGY

## 2021-09-23 PROCEDURE — 87340 HEPATITIS B SURFACE AG IA: CPT | Performed by: OBSTETRICS & GYNECOLOGY

## 2021-09-23 PROCEDURE — 86695 HERPES SIMPLEX TYPE 1 TEST: CPT | Performed by: OBSTETRICS & GYNECOLOGY

## 2021-09-23 PROCEDURE — 86592 SYPHILIS TEST NON-TREP QUAL: CPT | Performed by: OBSTETRICS & GYNECOLOGY

## 2021-09-23 PROCEDURE — 81003 URINALYSIS AUTO W/O SCOPE: CPT | Performed by: OBSTETRICS & GYNECOLOGY

## 2021-09-23 PROCEDURE — 99214 OFFICE O/P EST MOD 30 MIN: CPT | Performed by: OBSTETRICS & GYNECOLOGY

## 2021-09-23 NOTE — PROGRESS NOTES
"[facility]  Gynecology  Date of Service: 2021    CC: Pelvic pressure    HPI  Toshia Barraza is a 26 y.o.  premenopausal female who presents with complaints of pelvic pressure.      She states that this has been going on for about 2 months.  She describes \"knots\" in her lower pelvis.  She also has some burning in her lower pelvis.  Denies dysuria or hematuria.  Does have some occasional vaginal discharge; denies itch or odor.  She uses boric acid which does helps to prevent BV.    ROS  Review of Systems   Constitutional: Negative.    HENT: Negative.    Eyes: Negative.    Respiratory: Negative.    Cardiovascular: Negative.    Gastrointestinal: Negative.    Endocrine: Negative.    Genitourinary: Positive for pelvic pain.   Musculoskeletal: Negative.    Skin: Negative.    Allergic/Immunologic: Negative.    Neurological: Negative.    Hematological: Negative.    Psychiatric/Behavioral: Negative.      GYN HISTORY  Menarche: age 14  Menses: Regular, every 28 days, lasts 2-3 days, sometimes light/sometimes heavy, no intermenstrual bleeding  History of STIs: None per patient  Last pap smear:   Last Completed Pap Smear          PAP SMEAR (Every 3 Years) Next due on 2021  Liquid-based Pap Smear, Screening    2018  Liquid-based Pap Smear, Screening            Abnormal pap smear history: None  Contraception: None     OB HISTORY  OB History    Para Term  AB Living   2 2 1 1   2   SAB TAB Ectopic Molar Multiple Live Births             2      # Outcome Date GA Lbr Hitesh/2nd Weight Sex Delivery Anes PTL Lv   2 Term 2016   3310 g (7 lb 4.8 oz)  Vag-Spont   ABHISHEK   1       Vag-Spont   ABHISHEK      Complications: Gastroschisis     PAST MEDICAL HISTORY  Past Medical History:   Diagnosis Date   • Distorted body image    • Esophagitis 2019   • GERD (gastroesophageal reflux disease)      PAST SURGICAL HISTORY  Past Surgical History:   Procedure Laterality " Date   • BREAST AUGMENTATION      saline implants   • BREAST BIOPSY     • COLONOSCOPY N/A 4/10/2019    Procedure: COLONOSCOPY;  Surgeon: Aidan Alvarado MD;  Location: [facility] ENDOSCOPY;  Service: Gastroenterology   • ENDOSCOPY N/A 4/10/2019    Procedure: ESOPHAGOGASTRODUODENOSCOPY Wed/Fri;  Surgeon: Aidan Alvarado MD;  Location: [facility] ENDOSCOPY;  Service: Gastroenterology   • ENDOSCOPY N/A 6/2/2020    Procedure: ESOPHAGOGASTRODUODENOSCOPY possible dilation;  Surgeon: Aidan Alvarado MD;  Location: [facility] ENDOSCOPY;  Service: Gastroenterology;  Laterality: N/A;   • ENDOSCOPY N/A 3/1/2021    Procedure: ESOPHAGOGASTRODUODENOSCOPY possible dilation;  Surgeon: Aidan Alvarado MD;  Location: [facility] ENDOSCOPY;  Service: Gastroenterology;  Laterality: N/A;   • US GUIDED FINE NEEDLE ASPIRATION  7/1/2021     FAMILY HISTORY  Family History   Problem Relation Age of Onset   • Other Mother         hematologic disorder   • Heart disease Mother         ischemic heart disease   • Heart attack Mother    • Leukemia Mother    • Ovarian cancer Mother         questionable   • Heart disease Father    • Hypertension Father      SOCIAL HISTORY  Social History     Socioeconomic History   • Marital status:      Spouse name: Not on file   • Number of children: Not on file   • Years of education: Not on file   • Highest education level: Not on file   Tobacco Use   • Smoking status: Never Smoker   • Smokeless tobacco: Never Used   Vaping Use   • Vaping Use: Never used   Substance and Sexual Activity   • Alcohol use: Yes     Comment: social   • Drug use: No   • Sexual activity: Defer     ALLERGIES  Allergies   Allergen Reactions   • Beef-Derived Products Other (See Comments)     Alpha-gal   • Pork-Derived Products Other (See Comments)     Alpha-gal     HOME MEDICATIONS  Prior to Admission medications    Medication Sig Start Date End Date Taking? Authorizing Provider   albuterol sulfate  (90 Base) MCG/ACT inhaler Inhale 2  "puffs Every 4 (Four) Hours As Needed for Wheezing or Shortness of Air. 4/13/21  Yes Ciera Chinchilla APRN   aspirin (aspirin) 81 MG EC tablet Take 1 tablet by mouth Daily. 1/12/21  Yes Ciera Chinchilla APRN   baclofen (LIORESAL) 10 MG tablet TAKE 1 TABLET BY MOUTH AT NIGHT AS NEEDED FOR MUSCLE SPASMS OR MUSCLE CRAMPS OR TENSION OR NECK PAIN 9/20/21  Yes Janet Tony APRN   butalbital-acetaminophen  MG tablet tablet Take 1 tablet by mouth Every 6 (Six) Hours As Needed (headache). 7/26/19  Yes Ciera Chinchilla APRN   cyanocobalamin 1000 MCG/ML injection Inject 1 mL into the appropriate muscle as directed by prescriber Every 28 (Twenty-Eight) Days. 9/11/20  Yes Ciera Chinchilla APRN   dexlansoprazole (DEXILANT) 60 MG capsule Take 1 capsule by mouth Daily for 180 days. 8/18/21 2/14/22 Yes Janet Tony APRN   etodolac XL (LODINE XL) 400 MG 24 hr tablet Take 1 tablet by mouth Daily. Antiinflammatory for lower back pain 1/12/21  Yes Ciera Chinchilla APRN   famotidine (Pepcid) 20 MG tablet Take 1 tablet by mouth Every Night. 7/22/21  Yes Ciera Chinchilla APRN   hydrOXYzine (ATARAX) 25 MG tablet Take 1 tablet by mouth 3 (Three) Times a Day As Needed (allergic reaction). Caution dizziness/sleepiness 7/22/21  Yes Ciera Chinchilla APRN   propranolol (INDERAL) 40 MG tablet Take 1 tablet by mouth Daily. 8/18/21  Yes Janet Tony APRN   Scopolamine (TRANSDERM-SCOP) 1.5 MG/3DAYS patch Place 1 patch on the skin as directed by provider Every 72 (Seventy-Two) Hours. 4/13/21  Yes Ciera Chinchilla APRN   sucralfate (CARAFATE) 1 GM/10ML suspension SHAKE LIQUID AND TAKE 10 ML BY MOUTH FOUR TIMES DAILY AT BEDTIME WITH MEALS 3/8/21  Yes Aaliyah Arrieta APRN   Syringe/Needle, Disp, 25G X 5/8\" 3 ML misc 1 syringe Every 30 (Thirty) Days. 9/11/20  Yes Ciera Chinchilla APRN     PE  /64   Ht 162.6 cm (64\")   Wt 56.2 kg (124 lb)   LMP 09/01/2021 (Approximate)   BMI 21.28 kg/m²  "       General: Alert, healthy, no distress, well nourished and well developed.  Neurologic: Alert, oriented to person, place, and time.  Gait normal.  Cranial nerves II-XII grossly intact.  HEENT: Normocephalic, atraumatic.  Extraocular muscles intact, pupils equal and reactive times two.    Neck: Supple, no adenopathy, thyroid normal size, non-tender, without nodularity, trachea midline.  Lungs: Normal respiratory effort.  Clear to auscultation bilaterally.  No wheezes, rhonci, or rales.  Heart: Regular rate and rhythm.  No murmer, rub or gallop.  Abdomen: Soft, non-tender, non-distended,no masses, no hepatosplenomegaly, no hernia.  Skin: No rash, no lesions.  Extremities: No cyanosis, clubbing or edema.  PELVIC EXAM:  External Genitalia/Vulva: Anatomy is normal, no significant redness of labia, no discharge on vulvar tissues, [place]'s and Bartholin's glands are normal, no ulcers, no condylomatous lesions.  Urethral meatus: Normal, no lesions, no prolapse.  Urethra: Normal, no masses, no tenderness with palpation.  Bladder: Normal, no fullness, no masses, no tenderness with palpation.  Vagina: Vaginal tissues are not inflamed, normal color and texture, no significant discharge present.  Pelvic support adequate.  Cervix: Normal, no lesions, no purulent discharge, no cervical motion tenderness.  Uterus: Retroflexed uterus, tenderness with palpation.  Good mobility noted.  Minimal descent noted with good support.  Adnexa: Normal size and shape bilaterally, no palpable mass bilaterally and non-tender bilaterally.  Rectal: Normal, no masses or polyps, confirms bimanual exam, perianal normal, no lesions; ADÁN deferred.    IMPRESSION  Toshia Barraza is a 26 y.o.  presenting with pelvic pressure.    PLAN    1. Pelvic pressure in female  - Urinalysis With Culture If Indicated -; Future  - US Non-ob Transvaginal; Future    2. Vaginal discharge  - Gardnerella vaginalis, Trichomonas vaginalis, Candida albicans, DNA -  Swab, Vagina; Future  - OneSwab - Kit, Vagina; Future  - Gardnerella vaginalis, Trichomonas vaginalis, Candida albicans, DNA - Swab, Vagina    3. Screening for STD (sexually transmitted disease)  - Hepatitis B Surface Antigen  - Hepatitis C Antibody; Future  - HIV-1 / O / 2 Ag / Antibody 4th Generation; Future  - RPR; Future  - HSV Non-Specific Antibody, IgM; Future  - HSV 1 & 2 - Specific Antibody, IgG; Future    This document has been electronically signed by Rosaline Barrett MD on September 23, 2021 10:40 CDT.

## 2021-09-24 ENCOUNTER — TELEPHONE (OUTPATIENT)
Dept: OBSTETRICS AND GYNECOLOGY | Facility: CLINIC | Age: 26
End: 2021-09-24

## 2021-09-24 LAB
HBV SURFACE AG SERPL QL IA: NORMAL
HCV AB SER DONR QL: NORMAL
HIV1+2 AB SER QL: NORMAL
RPR SER QL: NORMAL

## 2021-09-24 RX ORDER — PHENAZOPYRIDINE HYDROCHLORIDE 200 MG/1
200 TABLET, FILM COATED ORAL 3 TIMES DAILY PRN
Qty: 9 TABLET | Refills: 0 | Status: SHIPPED | OUTPATIENT
Start: 2021-09-24 | End: 2021-09-29

## 2021-09-24 NOTE — TELEPHONE ENCOUNTER
Having increased bladder pain, especially when urinating but still having a constant pelvic discomfort. UA and vag panel were negative. One Swab results pending. Sent Pyridium but explained that it may not help with her discomfort. Pt agrees to plan and v/u.

## 2021-09-25 LAB
HSV1 IGG SER IA-ACNC: 52.4 INDEX (ref 0–0.9)
HSV2 IGG SER IA-ACNC: <0.91 INDEX (ref 0–0.9)

## 2021-09-27 ENCOUNTER — TELEPHONE (OUTPATIENT)
Dept: OBSTETRICS AND GYNECOLOGY | Facility: CLINIC | Age: 26
End: 2021-09-27

## 2021-09-27 DIAGNOSIS — R30.0 DYSURIA: Primary | ICD-10-CM

## 2021-09-27 LAB — HSV1+2 IGM SER IA-ACNC: <0.91 RATIO (ref 0–0.9)

## 2021-09-27 NOTE — TELEPHONE ENCOUNTER
"Returned patient call.  States she still feels like she has a UTI after testing negative last week. Patient complaining of urinary urgency and feeling \"like there is bacteria when she pees\".    Let her know I spoke with dr navarro and she states she can leave a urine sample for a repeat urinalysis and if it is positive we will send patient medication to treat but if it is negative she will need a referral to a bladder specialist. Let her know once we receive results and dr navarro has reviewed them we will either call or send patient a Booster message.  Patient verbalized understanding, orders have been entered.  "

## 2021-09-28 ENCOUNTER — TELEPHONE (OUTPATIENT)
Dept: OBSTETRICS AND GYNECOLOGY | Facility: CLINIC | Age: 26
End: 2021-09-28

## 2021-09-28 ENCOUNTER — LAB (OUTPATIENT)
Dept: LAB | Facility: OTHER | Age: 26
End: 2021-09-28

## 2021-09-28 DIAGNOSIS — R30.0 DYSURIA: ICD-10-CM

## 2021-09-28 LAB
BACTERIA UR QL AUTO: ABNORMAL /HPF
BILIRUB UR QL STRIP: ABNORMAL
CLARITY UR: ABNORMAL
COLOR UR: ABNORMAL
GLUCOSE UR STRIP-MCNC: ABNORMAL MG/DL
HGB UR QL STRIP.AUTO: ABNORMAL
HYALINE CASTS UR QL AUTO: ABNORMAL /LPF
KETONES UR QL STRIP: ABNORMAL
LEUKOCYTE ESTERASE UR QL STRIP.AUTO: ABNORMAL
NITRITE UR QL STRIP: ABNORMAL
PH UR STRIP.AUTO: 5.5 [PH] (ref 5.5–8)
PROT UR QL STRIP: ABNORMAL
RBC # UR: ABNORMAL /HPF
REF LAB TEST METHOD: ABNORMAL
SP GR UR STRIP: 1.02 (ref 1–1.03)
SQUAMOUS #/AREA URNS HPF: ABNORMAL /HPF
UROBILINOGEN UR QL STRIP: ABNORMAL
WBC UR QL AUTO: ABNORMAL /HPF

## 2021-09-28 PROCEDURE — 87088 URINE BACTERIA CULTURE: CPT | Performed by: OBSTETRICS & GYNECOLOGY

## 2021-09-28 PROCEDURE — 87186 SC STD MICRODIL/AGAR DIL: CPT | Performed by: OBSTETRICS & GYNECOLOGY

## 2021-09-28 PROCEDURE — 81001 URINALYSIS AUTO W/SCOPE: CPT | Performed by: OBSTETRICS & GYNECOLOGY

## 2021-09-28 PROCEDURE — 87086 URINE CULTURE/COLONY COUNT: CPT | Performed by: OBSTETRICS & GYNECOLOGY

## 2021-09-28 NOTE — TELEPHONE ENCOUNTER
Called and spoke with patient regarding results and dr navarro recommendations.  Let her know that once results come back on the send out swab we will call her or send a FABPuloust message.  Patient verbalized understanding

## 2021-09-28 NOTE — TELEPHONE ENCOUNTER
----- Message from Rosaline Barrett MD sent at 9/27/2021  2:55 PM CDT -----  Please let her know that STD testing negative.  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  She should be mindful that if she engages in oral sex while having a cold sore, she can pass that to her partner.

## 2021-09-29 ENCOUNTER — TELEPHONE (OUTPATIENT)
Dept: OBSTETRICS AND GYNECOLOGY | Facility: CLINIC | Age: 26
End: 2021-09-29

## 2021-09-29 DIAGNOSIS — N30.00 ACUTE CYSTITIS WITHOUT HEMATURIA: Primary | ICD-10-CM

## 2021-09-29 RX ORDER — SULFAMETHOXAZOLE AND TRIMETHOPRIM 800; 160 MG/1; MG/1
1 TABLET ORAL 2 TIMES DAILY
Qty: 6 TABLET | Refills: 0 | Status: SHIPPED | OUTPATIENT
Start: 2021-09-29 | End: 2021-11-08

## 2021-09-29 RX ORDER — PHENAZOPYRIDINE HYDROCHLORIDE 200 MG/1
200 TABLET, FILM COATED ORAL 3 TIMES DAILY PRN
Qty: 20 TABLET | Refills: 0 | Status: SHIPPED | OUTPATIENT
Start: 2021-09-29 | End: 2021-11-08

## 2021-09-29 NOTE — TELEPHONE ENCOUNTER
Patient called and was checking on the status of her lab results. Her number to call back is [phone].          Thank you,      [place]

## 2021-09-30 LAB — BACTERIA SPEC AEROBE CULT: ABNORMAL

## 2021-09-30 NOTE — TELEPHONE ENCOUNTER
Called and spoke with patient. Notified patient she did have a UTI and that dr navarro has sent in medication to her pharmacy.    Patient verbalized understanding

## 2021-10-06 ENCOUNTER — TELEPHONE (OUTPATIENT)
Dept: OBSTETRICS AND GYNECOLOGY | Facility: CLINIC | Age: 26
End: 2021-10-06

## 2021-10-11 NOTE — TELEPHONE ENCOUNTER
Called and spoke with Tere at [facility] regarding patient report for OneSwab as it is not on file in our office and we do not have results. States she will refax results to office, given alternate fax number for results to be sent to.  Once Dr navarro has reviewed we will contact patient to let her know results. Sending patient ActionFlow message to let her know.

## 2021-10-12 ENCOUNTER — TELEPHONE (OUTPATIENT)
Dept: OBSTETRICS AND GYNECOLOGY | Facility: CLINIC | Age: 26
End: 2021-10-12

## 2021-10-12 RX ORDER — AMPICILLIN 500 MG/1
500 CAPSULE ORAL 4 TIMES DAILY
Qty: 28 CAPSULE | Refills: 0 | Status: SHIPPED | OUTPATIENT
Start: 2021-10-12 | End: 2021-10-19

## 2021-10-12 RX ORDER — METRONIDAZOLE 500 MG/1
500 TABLET ORAL 2 TIMES DAILY
Qty: 14 TABLET | Refills: 0 | Status: SHIPPED | OUTPATIENT
Start: 2021-10-12 | End: 2021-10-19

## 2021-10-12 NOTE — TELEPHONE ENCOUNTER
Called and spoke with patient regarding results. Let her know dr navarro has sent in medication to her pharmacy.  Patient verbalized understanding

## 2021-10-12 NOTE — TELEPHONE ENCOUNTER
Reviewed OneSwab results.  +Gardnerella vaginalis, Atopobium vaginae, GBS, and Enterococcus.  Please call patient and let her know results; will send in Flagyl and ampicillin.    Rosaline Barrett MD  10/12/2021  08:45 CDT

## 2021-10-17 DIAGNOSIS — M54.2 NECK PAIN ON LEFT SIDE: ICD-10-CM

## 2021-10-18 RX ORDER — BACLOFEN 10 MG/1
TABLET ORAL
Qty: 30 TABLET | Refills: 0 | Status: SHIPPED | OUTPATIENT
Start: 2021-10-18

## 2021-11-08 ENCOUNTER — OFFICE VISIT (OUTPATIENT)
Dept: FAMILY MEDICINE CLINIC | Facility: CLINIC | Age: 26
End: 2021-11-08

## 2021-11-08 VITALS
BODY MASS INDEX: 21.44 KG/M2 | OXYGEN SATURATION: 98 % | DIASTOLIC BLOOD PRESSURE: 64 MMHG | SYSTOLIC BLOOD PRESSURE: 112 MMHG | HEART RATE: 78 BPM | HEIGHT: 64 IN | WEIGHT: 125.6 LBS

## 2021-11-08 DIAGNOSIS — G90.A POTS (POSTURAL ORTHOSTATIC TACHYCARDIA SYNDROME): Primary | ICD-10-CM

## 2021-11-08 DIAGNOSIS — R42 DIZZINESS: ICD-10-CM

## 2021-11-08 DIAGNOSIS — R51.9 NONINTRACTABLE HEADACHE, UNSPECIFIED CHRONICITY PATTERN, UNSPECIFIED HEADACHE TYPE: ICD-10-CM

## 2021-11-08 DIAGNOSIS — R41.0 CONFUSION: ICD-10-CM

## 2021-11-08 DIAGNOSIS — R53.1 WEAKNESS: ICD-10-CM

## 2021-11-08 PROCEDURE — 99213 OFFICE O/P EST LOW 20 MIN: CPT | Performed by: NURSE PRACTITIONER

## 2021-11-08 NOTE — PATIENT INSTRUCTIONS
Dizziness  Dizziness is a common problem. It makes you feel unsteady or light-headed. You may feel like you are about to pass out (faint). Dizziness can lead to getting hurt if you stumble or fall. Dizziness can be caused by many things, including:  · Medicines.  · Not having enough water in your body (dehydration).  · Illness.  Follow these instructions at home:  Eating and drinking    · Drink enough fluid to keep your pee (urine) clear or pale yellow. This helps to keep you from getting dehydrated. Try to drink more clear fluids, such as water.  · Do not drink alcohol.  · Limit how much caffeine you drink or eat, if your doctor tells you to do that.  · Limit how much salt (sodium) you drink or eat, if your doctor tells you to do that.    Activity    · Avoid making quick movements.  ? When you stand up from sitting in a chair, steady yourself until you feel okay.  ? In the morning, first sit up on the side of the bed. When you feel okay, stand slowly while you hold onto something. Do this until you know that your balance is fine.  · If you need to  one place for a long time, move your legs often. Tighten and relax the muscles in your legs while you are standing.  · Do not drive or use heavy machinery if you feel dizzy.  · Avoid bending down if you feel dizzy. Place items in your home so you can reach them easily without leaning over.    Lifestyle  · Do not use any products that contain nicotine or tobacco, such as cigarettes and e-cigarettes. If you need help quitting, ask your doctor.  · Try to lower your stress level. You can do this by using methods such as yoga or meditation. Talk with your doctor if you need help.  General instructions  · Watch your dizziness for any changes.  · Take over-the-counter and prescription medicines only as told by your doctor. Talk with your doctor if you think that you are dizzy because of a medicine that you are taking.  · Tell a friend or a family member that you are  feeling dizzy. If he or she notices any changes in your behavior, have this person call your doctor.  · Keep all follow-up visits as told by your doctor. This is important.  Contact a doctor if:  · Your dizziness does not go away.  · Your dizziness or light-headedness gets worse.  · You feel sick to your stomach (nauseous).  · You have trouble hearing.  · You have new symptoms.  · You are unsteady on your feet.  · You feel like the room is spinning.  Get help right away if:  · You throw up (vomit) or have watery poop (diarrhea), and you cannot eat or drink anything.  · You have trouble:  ? Talking.  ? Walking.  ? Swallowing.  ? Using your arms, hands, or legs.  · You feel generally weak.  · You are not thinking clearly, or you have trouble forming sentences. A friend or family member may notice this.  · You have:  ? Chest pain.  ? Pain in your belly (abdomen).  ? Shortness of breath.  ? Sweating.  · Your vision changes.  · You are bleeding.  · You have a very bad headache.  · You have neck pain or a stiff neck.  · You have a fever.  These symptoms may be an emergency. Do not wait to see if the symptoms will go away. Get medical help right away. Call your local emergency services (911 in the [place] Do not drive yourself to the hospital.  Summary  · Dizziness makes you feel unsteady or light-headed. You may feel like you are about to pass out (faint).  · Drink enough fluid to keep your pee (urine) clear or pale yellow. Do not drink alcohol.  · Avoid making quick movements if you feel dizzy.  · Watch your dizziness for any changes.  This information is not intended to replace advice given to you by your health care provider. Make sure you discuss any questions you have with your health care provider.  Document Revised: 12/21/2018 Document Reviewed: 01/04/2018  Elsevier Patient Education © 2021 [employer]

## 2021-11-08 NOTE — PROGRESS NOTES
"CC: Dizziness (head pressure, when getting dizzy gets really confused, the past year has gotten worse)      Subjective:  Toshia Barraza is a 26 y.o. female who presents for     Patient presents to office with complaint of dizziness.  She does have a history of POTS.  States when she gets dizzy she develops head pressure and is really confused.  States the past year symptoms have gotten worse. States her spine feels like it is \"draining or something pulling on it.\" States it has worsened in the last year. Had a MRI  3/31/21 and was negative. Pt has seen a neurologist in the past for, but the neurologist left and she hasn't been seen since then by neuro.     Dizziness  This is a chronic problem. The current episode started more than 1 year ago. The problem occurs every several days. The problem has been gradually worsening. Associated symptoms include abdominal pain, a change in bowel habit, fatigue, headaches, myalgias, nausea, neck pain, a visual change (Has been to eye doctor.) and weakness. Pertinent negatives include no anorexia, arthralgias, chest pain, chills, congestion, coughing, diaphoresis, fever, joint swelling, numbness, rash, sore throat, swollen glands, urinary symptoms, vertigo or vomiting. Nothing aggravates the symptoms. She has tried rest (Increase water intake) for the symptoms. The treatment provided no relief.       The following portions of the patient's history were reviewed and updated as appropriate: allergies, current medications, past family history, past medical history, past social history, past surgical history and problem list.    Past Medical History:   Diagnosis Date   • Distorted body image    • Esophagitis 4/28/2019   • GERD (gastroesophageal reflux disease)    • Herpes simplex type 1 antibody positive          Current Outpatient Medications:   •  albuterol sulfate  (90 Base) MCG/ACT inhaler, Inhale 2 puffs Every 4 (Four) Hours As Needed for Wheezing or Shortness of " "Air., Disp: 18 g, Rfl: 0  •  aspirin (aspirin) 81 MG EC tablet, Take 1 tablet by mouth Daily., Disp: 30 tablet, Rfl: 5  •  baclofen (LIORESAL) 10 MG tablet, TAKE 1 TABLET BY MOUTH AT NIGHT AS NEEDED FOR MUSCLE SPASMS OR MUSCLE CRAMPS OR TENSION OR NECK PAIN, Disp: 30 tablet, Rfl: 0  •  cyanocobalamin 1000 MCG/ML injection, Inject 1 mL into the appropriate muscle as directed by prescriber Every 28 (Twenty-Eight) Days., Disp: 1 mL, Rfl: 5  •  dexlansoprazole (DEXILANT) 60 MG capsule, Take 1 capsule by mouth Daily for 180 days., Disp: 30 capsule, Rfl: 5  •  famotidine (Pepcid) 20 MG tablet, Take 1 tablet by mouth Every Night., Disp: 30 tablet, Rfl: 5  •  propranolol (INDERAL) 40 MG tablet, Take 1 tablet by mouth Daily., Disp: 30 tablet, Rfl: 5  •  sucralfate (CARAFATE) 1 GM/10ML suspension, SHAKE LIQUID AND TAKE 10 ML BY MOUTH FOUR TIMES DAILY AT BEDTIME WITH MEALS, Disp: 1260 mL, Rfl: 2  •  Syringe/Needle, Disp, 25G X 5/8\" 3 ML misc, 1 syringe Every 30 (Thirty) Days., Disp: 1 each, Rfl: 5    Current Facility-Administered Medications:   •  cyanocobalamin injection 1,000 mcg, 1,000 mcg, Intramuscular, Q7 Days, Janet Tony, LATRICE, 1,000 mcg at 09/07/21 1145    Review of Systems    Review of Systems   Constitutional: Positive for fatigue. Negative for chills, diaphoresis and fever.   HENT: Negative for congestion and sore throat.    Eyes: Negative.    Respiratory: Negative.  Negative for cough.    Cardiovascular: Negative.  Negative for chest pain.   Gastrointestinal: Positive for abdominal pain, change in bowel habit and nausea. Negative for anorexia and vomiting.   Endocrine: Negative.    Genitourinary: Negative.    Musculoskeletal: Positive for myalgias and neck pain. Negative for arthralgias and joint swelling.   Skin: Negative.  Negative for rash.   Allergic/Immunologic: Negative.    Neurological: Positive for dizziness, weakness and headaches. Negative for vertigo and numbness.   Hematological: Negative.  " "  Psychiatric/Behavioral: Negative.    All other systems reviewed and are negative.      Objective  Vitals:    11/08/21 0843   BP: 112/64   Pulse: 78   SpO2: 98%   Weight: 57 kg (125 lb 9.6 oz)   Height: 162.6 cm (64\")     Body mass index is 21.56 kg/m².    Physical Exam    Physical Exam  Vitals and nursing note reviewed.   Constitutional:       General: She is not in acute distress.     Appearance: Normal appearance. She is well-developed. She is not ill-appearing, toxic-appearing or diaphoretic.   HENT:      Head: Normocephalic and atraumatic.   Eyes:      General: No scleral icterus.        Right eye: No discharge.         Left eye: No discharge.      Extraocular Movements: Extraocular movements intact.      Conjunctiva/sclera: Conjunctivae normal.   Neck:      Vascular: No carotid bruit.   Cardiovascular:      Rate and Rhythm: Normal rate and regular rhythm.      Pulses: Normal pulses.      Heart sounds: Normal heart sounds. No murmur heard.  No friction rub. No gallop.    Pulmonary:      Effort: Pulmonary effort is normal. No respiratory distress.      Breath sounds: Normal breath sounds. No stridor. No wheezing, rhonchi or rales.   Chest:      Chest wall: No tenderness.   Abdominal:      General: Bowel sounds are normal. There is no distension.      Palpations: Abdomen is soft. There is no mass.      Tenderness: There is no abdominal tenderness. There is no guarding or rebound.      Hernia: No hernia is present.   Musculoskeletal:      Cervical back: Normal range of motion and neck supple. No rigidity or tenderness. No muscular tenderness.      Right lower leg: No edema.      Left lower leg: No edema.   Lymphadenopathy:      Cervical: No cervical adenopathy.   Skin:     General: Skin is warm and dry.      Capillary Refill: Capillary refill takes less than 2 seconds.      Coloration: Skin is not jaundiced or pale.      Findings: No bruising, erythema, lesion or rash.   Neurological:      General: No focal " deficit present.      Mental Status: She is alert and oriented to person, place, and time. Mental status is at baseline.   Psychiatric:         Mood and Affect: Mood normal.         Behavior: Behavior normal.         Thought Content: Thought content normal.         Judgment: Judgment normal.             Diagnoses and all orders for this visit:    1. POTS (postural orthostatic tachycardia syndrome) (Primary)  -     Ambulatory Referral to Neurology    2. Dizziness  -     Ambulatory Referral to Neurology    3. Weakness  -     Ambulatory Referral to Neurology    4. Nonintractable headache, unspecified chronicity pattern, unspecified headache type  -     Ambulatory Referral to Neurology    5. Confusion  -     Ambulatory Referral to Neurology       Because of patient's symptoms, will refer her back to neurology for further evaluation and possible treatment of the POTS and the symptoms that she is having.  Patient had MRI of the brain done in March 2021 and it was normal.  Patient advised to call her cardiologist and get back in with him that is Dr. Awad.  She states she will call and get an appointment with him for further evaluation and possible treatment of symptoms.  Patient advised if any acute worsening in symptoms to follow-up or go to the ER.  Answered all questions.  Patient verbalized understanding of plan of care.      This document has been electronically signed by LATRICE Guthrie on November 8, 2021 09:12 CST

## 2021-11-10 ENCOUNTER — TELEPHONE (OUTPATIENT)
Dept: OBSTETRICS AND GYNECOLOGY | Facility: CLINIC | Age: 26
End: 2021-11-10

## 2021-11-11 NOTE — TELEPHONE ENCOUNTER
Called patient discussed results she would like to come in for a surgery consult.  Appointment scheduled and patient has no other concerns at this time.

## 2021-12-08 ENCOUNTER — TELEPHONE (OUTPATIENT)
Dept: OBSTETRICS AND GYNECOLOGY | Facility: CLINIC | Age: 26
End: 2021-12-08

## 2021-12-08 NOTE — TELEPHONE ENCOUNTER
PATIENT CALLED AND IS NEEDING A MEDICATION CALLED IN FOR A YEAST INFECTION/ BACTERIAL INFECTION. SHE SAID THAT DR RIOS HAS CALLED IT IN BEFORE. HER PHARMACY IS [facility] IN [place]. HER NUMBER [phone].      THANK YOU,        KATIE

## 2021-12-09 ENCOUNTER — TELEPHONE (OUTPATIENT)
Dept: OBSTETRICS AND GYNECOLOGY | Facility: CLINIC | Age: 26
End: 2021-12-09

## 2021-12-09 DIAGNOSIS — R30.0 DYSURIA: Primary | ICD-10-CM

## 2021-12-09 NOTE — TELEPHONE ENCOUNTER
LATRICE Gonsales called and spoke with patient. Entered orders for labs and will call patient once results are back and have been reviewed.

## 2022-03-21 ENCOUNTER — OFFICE VISIT (OUTPATIENT)
Dept: GASTROENTEROLOGY | Facility: CLINIC | Age: 27
End: 2022-03-21

## 2022-03-21 VITALS
BODY MASS INDEX: 21.03 KG/M2 | DIASTOLIC BLOOD PRESSURE: 68 MMHG | HEART RATE: 91 BPM | WEIGHT: 123.2 LBS | HEIGHT: 64 IN | SYSTOLIC BLOOD PRESSURE: 108 MMHG

## 2022-03-21 DIAGNOSIS — R10.10 UPPER ABDOMINAL PAIN: Primary | ICD-10-CM

## 2022-03-21 DIAGNOSIS — R11.0 NAUSEA: ICD-10-CM

## 2022-03-21 PROBLEM — I27.20 PULMONARY HYPERTENSION (HCC): Status: ACTIVE | Noted: 2022-03-21

## 2022-03-21 PROBLEM — Q21.12 PATENT FORAMEN OVALE: Status: ACTIVE | Noted: 2022-03-21

## 2022-03-21 PROBLEM — Z86.16 HISTORY OF SEVERE ACUTE RESPIRATORY SYNDROME CORONAVIRUS 2 (SARS-COV-2) DISEASE: Status: ACTIVE | Noted: 2020-10-30

## 2022-03-21 PROCEDURE — 99213 OFFICE O/P EST LOW 20 MIN: CPT | Performed by: NURSE PRACTITIONER

## 2022-03-21 RX ORDER — DEXLANSOPRAZOLE 60 MG/1
60 CAPSULE, DELAYED RELEASE ORAL DAILY
COMMUNITY
End: 2022-03-21 | Stop reason: SDUPTHER

## 2022-03-21 RX ORDER — ESCITALOPRAM OXALATE 10 MG/1
TABLET ORAL
COMMUNITY
Start: 2022-03-15

## 2022-03-21 RX ORDER — DEXLANSOPRAZOLE 60 MG/1
60 CAPSULE, DELAYED RELEASE ORAL DAILY
Qty: 30 CAPSULE | Refills: 5 | Status: SHIPPED | OUTPATIENT
Start: 2022-03-21 | End: 2022-11-14 | Stop reason: SDUPTHER

## 2022-03-21 NOTE — PROGRESS NOTES
"Chief Complaint   Patient presents with   • Abdominal Pain   • Heartburn       Subjective    Toshia Barraza is a 27 y.o. female. she is here today for follow-up.  27-year-old female presents to discuss worsening upper abdominal pain heartburn and \"weird feeling\" reports when she eats certain things such as sugar or doughnuts her abdomen feels very weird she feels weak like she may pass out.  In the past we have done EGD which noted esophagitis gastritis normal duodenum.  She has tried several different PPIs along with Carafate Dexilant seems to be working best for her.  Ultrasound was normal HIDA scan noted normal visualization of the gallbladder with ejection fraction of 57%.    Abdominal Pain  This is a recurrent problem. The current episode started more than 1 month ago. The onset quality is gradual. The problem occurs intermittently. The problem has been waxing and waning. Associated symptoms include nausea. Pertinent negatives include no arthralgias, constipation, diarrhea, fever or vomiting. Her past medical history is significant for GERD.   Heartburn  She complains of abdominal pain and nausea. She reports no sore throat. Associated symptoms include fatigue.            The following portions of the patient's history were reviewed and updated as appropriate:   Past Medical History:   Diagnosis Date   • Distorted body image    • Esophagitis 4/28/2019   • GERD (gastroesophageal reflux disease)    • Herpes simplex type 1 antibody positive      Past Surgical History:   Procedure Laterality Date   • BREAST AUGMENTATION      saline implants   • BREAST BIOPSY     • COLONOSCOPY N/A 4/10/2019    Procedure: COLONOSCOPY;  Surgeon: Aidan Alvarado MD;  Location: [facility] ENDOSCOPY;  Service: Gastroenterology   • ENDOSCOPY N/A 4/10/2019    Procedure: ESOPHAGOGASTRODUODENOSCOPY Wed/Fri;  Surgeon: Aidan Alvarado MD;  Location: [facility] ENDOSCOPY;  Service: Gastroenterology   • ENDOSCOPY N/A 6/2/2020    Procedure: " ESOPHAGOGASTRODUODENOSCOPY possible dilation;  Surgeon: Aidan Alvarado MD;  Location: [facility] ENDOSCOPY;  Service: Gastroenterology;  Laterality: N/A;   • ENDOSCOPY N/A 3/1/2021    Procedure: ESOPHAGOGASTRODUODENOSCOPY possible dilation;  Surgeon: Aidan Alvarado MD;  Location: [facility] ENDOSCOPY;  Service: Gastroenterology;  Laterality: N/A;   • US GUIDED FINE NEEDLE ASPIRATION  2021     Family History   Problem Relation Age of Onset   • Other Mother         hematologic disorder   • Heart disease Mother         ischemic heart disease   • Heart attack Mother    • Leukemia Mother    • Ovarian cancer Mother         questionable   • Heart disease Father    • Hypertension Father      OB History        2    Para   2    Term   1       1    AB        Living   2       SAB        IAB        Ectopic        Molar        Multiple        Live Births   2              Prior to Admission medications    Medication Sig Start Date End Date Taking? Authorizing Provider   albuterol sulfate  (90 Base) MCG/ACT inhaler Inhale 2 puffs Every 4 (Four) Hours As Needed for Wheezing or Shortness of Air. 21  Yes Ciera Chinchilla APRN   aspirin (aspirin) 81 MG EC tablet Take 1 tablet by mouth Daily. 21  Yes Ciera Chinchilla APRN   baclofen (LIORESAL) 10 MG tablet TAKE 1 TABLET BY MOUTH AT NIGHT AS NEEDED FOR MUSCLE SPASMS OR MUSCLE CRAMPS OR TENSION OR NECK PAIN 10/18/21  Yes Janet Tony APRN   cyanocobalamin 1000 MCG/ML injection Inject 1 mL into the appropriate muscle as directed by prescriber Every 28 (Twenty-Eight) Days. 20  Yes Ciera Chinchilla APRN   dexlansoprazole (Dexilant) 60 MG capsule Take 60 mg by mouth Daily.   Yes Provider, MD Jane   propranolol (INDERAL) 40 MG tablet Take 1 tablet by mouth Daily. 21  Yes Janet Tony APRN   sucralfate (CARAFATE) 1 GM/10ML suspension SHAKE LIQUID AND TAKE 10 ML BY MOUTH FOUR TIMES DAILY AT BEDTIME WITH MEALS 3/8/21   "Yes Aaliyah Arrieta APRN   escitalopram (LEXAPRO) 10 MG tablet  3/15/22   Provider, MD Jane   Syringe/Needle, Disp, 25G X 5/8\" 3 ML misc 1 syringe Every 30 (Thirty) Days. 9/11/20   Ciera Chinchilla APRN   famotidine (Pepcid) 20 MG tablet Take 1 tablet by mouth Every Night. 7/22/21 3/21/22  Ciera Chinchilla APRN     Allergies   Allergen Reactions   • Beef-Derived Products Other (See Comments)     Alpha-gal   • Pork-Derived Products Other (See Comments)     Alpha-gal     Social History     Socioeconomic History   • Marital status:    Tobacco Use   • Smoking status: Never Smoker   • Smokeless tobacco: Never Used   Vaping Use   • Vaping Use: Never used   Substance and Sexual Activity   • Alcohol use: Yes     Comment: social   • Drug use: No   • Sexual activity: Defer       Review of Systems  Review of Systems   Constitutional: Positive for fatigue. Negative for activity change, appetite change, chills, diaphoresis, fever and unexpected weight change.   HENT: Negative for sore throat and trouble swallowing.    Respiratory: Negative for shortness of breath.    Gastrointestinal: Positive for abdominal pain and nausea. Negative for abdominal distention, anal bleeding, blood in stool, constipation, diarrhea, rectal pain and vomiting.   Musculoskeletal: Negative for arthralgias.   Skin: Negative for pallor.   Neurological: Positive for weakness. Negative for light-headedness.        /68 (BP Location: Left arm)   Pulse 91   Ht 162.6 cm (64\")   Wt 55.9 kg (123 lb 3.2 oz)   BMI 21.15 kg/m²     Objective    Physical Exam  Constitutional:       General: She is not in acute distress.     Appearance: Normal appearance. She is normal weight. She is not ill-appearing.   HENT:      Head: Normocephalic and atraumatic.   Pulmonary:      Effort: Pulmonary effort is normal.   Abdominal:      General: Abdomen is flat. Bowel sounds are normal. There is no distension.      Palpations: Abdomen is soft. There is no " mass.      Tenderness: There is no abdominal tenderness.   Neurological:      Mental Status: She is alert.       Lab on 09/28/2021   Component Date Value Ref Range Status   • Color, UA 09/28/2021 Orange (A) Yellow, Straw Final   • Appearance, UA 09/28/2021 Cloudy (A) Clear Final   • pH, UA 09/28/2021 5.5  5.5 - 8.0 Final   • Specific Gravity, UA 09/28/2021 1.020  1.005 - 1.030 Final   • Glucose, UA 09/28/2021 Color obscures results (A) Negative Final   • Ketones, UA 09/28/2021 Color obscures results (A) Negative Final   • Bilirubin, UA 09/28/2021 Color obscures results (A) Negative Final   • Blood, UA 09/28/2021 Color obscures results (A) Negative Final   • Protein, UA 09/28/2021 Color obscures results (A) Negative Final   • Leuk Esterase, UA 09/28/2021 Color obscures results (A) Negative Final   • Nitrite, UA 09/28/2021 Color obscures results (A) Negative Final   • Urobilinogen, UA 09/28/2021 Color obscures results (A) 0.2 - 1.0 E.U./dL Final   • RBC, UA 09/28/2021 3-5 (A) None Seen /HPF Final   • WBC, UA 09/28/2021 Too Numerous to Count (A) None Seen /HPF Final   • Bacteria, UA 09/28/2021 2+ (A) None Seen /HPF Final   • Squamous Epithelial Cells, UA 09/28/2021 3-6 (A) None Seen, 0-2 /HPF Final   • Hyaline Casts, UA 09/28/2021 None Seen  None Seen /LPF Final   • Methodology 09/28/2021 Manual Light Microscopy   Final   • Urine Culture 09/28/2021 >100,000 CFU/mL Escherichia coli (A)  Final     Assessment/Plan      1. Upper abdominal pain    2. Nausea    .   Will obtain CT abdomen pelvis due to chronic worsening abdominal pain rule out any underlying etiology.  We will also obtain sucrose breath test to rule out genetic sucrase isomaltase deficiency    Orders placed during this encounter include:  Orders Placed This Encounter   Procedures   • CT Abdomen Pelvis With Contrast     Standing Status:   Future     Standing Expiration Date:   3/21/2023     Order Specific Question:   Will Oral Contrast be needed for this  procedure?     Answer:   Yes     Order Specific Question:   Patient Pregnant     Answer:   No     Order Specific Question:   Release to patient     Answer:   Immediate   • Sucrose Breath Test     Standing Status:   Future     Standing Expiration Date:   3/21/2023     Order Specific Question:   Release to patient     Answer:   Immediate       * Surgery not found *    Review and/or summary of lab tests, radiology, procedures, medications. Review and summary of old records and obtaining of history. The risks and benefits of my recommendations, as well as other treatment options were discussed with the patient today. Questions were answered.    New Medications Ordered This Visit   Medications   • dexlansoprazole (Dexilant) 60 MG capsule     Sig: Take 1 capsule by mouth Daily.     Dispense:  30 capsule     Refill:  5       Follow-up: Return in about 4 weeks (around 4/18/2022) for Recheck, After test.          This document has been electronically signed by LATRICE Pearce on March 24, 2022 16:54 CDT           I spent 22 minutes caring for Toshia on this date of service. This time includes time spent by me in the following activities:preparing for the visit, reviewing tests, obtaining and/or reviewing a separately obtained history, performing a medically appropriate examination and/or evaluation , counseling and educating the patient/family/caregiver, ordering medications, tests, or procedures, referring and communicating with other health care professionals , documenting information in the medical record and care coordination    Results for orders placed or performed in visit on 09/28/21   Urinalysis, Microscopic Only - Urine, Clean Catch    Specimen: Urine, Clean Catch   Result Value Ref Range    RBC, UA 3-5 (A) None Seen /HPF    WBC, UA Too Numerous to Count (A) None Seen /HPF    Bacteria, UA 2+ (A) None Seen /HPF    Squamous Epithelial Cells, UA 3-6 (A) None Seen, 0-2 /HPF    Hyaline Casts, UA None Seen None Seen  /LPF    Methodology Manual Light Microscopy    Urinalysis With Culture If Indicated - Urine, Clean Catch    Specimen: Urine, Clean Catch   Result Value Ref Range    Color, UA Orange (A) Yellow, Straw    Appearance, UA Cloudy (A) Clear    pH, UA 5.5 5.5 - 8.0    Specific Gravity, UA 1.020 1.005 - 1.030    Glucose, UA Color obscures results (A) Negative    Ketones, UA Color obscures results (A) Negative    Bilirubin, UA Color obscures results (A) Negative    Blood, UA Color obscures results (A) Negative    Protein, UA Color obscures results (A) Negative    Leuk Esterase, UA Color obscures results (A) Negative    Nitrite, UA Color obscures results (A) Negative    Urobilinogen, UA Color obscures results (A) 0.2 - 1.0 E.U./dL   Urine Culture - Urine, Urine, Clean Catch    Specimen: Urine, Clean Catch   Result Value Ref Range    Urine Culture >100,000 CFU/mL Escherichia coli (A)        Susceptibility    Escherichia coli - REY     Ampicillin  Susceptible ug/ml     Ampicillin + Sulbactam  Susceptible ug/ml     Cefazolin  Susceptible ug/ml     Cefepime  Susceptible ug/ml     Ceftazidime  Susceptible ug/ml     Ceftriaxone  Susceptible ug/ml     Gentamicin  Susceptible ug/ml     Levofloxacin  Susceptible ug/ml     Nitrofurantoin  Susceptible ug/ml     Piperacillin + Tazobactam  Susceptible ug/ml     Tetracycline  Susceptible ug/ml     Trimethoprim + Sulfamethoxazole  Susceptible ug/ml   Results for orders placed or performed in visit on 09/23/21   Urinalysis With Culture If Indicated - Urine, Clean Catch    Specimen: Urine, Clean Catch   Result Value Ref Range    Color, UA Yellow Yellow, Straw    Appearance, UA Clear Clear    pH, UA 6.5 5.5 - 8.0    Specific Gravity, UA 1.025 1.005 - 1.030    Glucose, UA Negative Negative    Ketones, UA Trace (A) Negative    Bilirubin, UA Negative Negative    Blood, UA Negative Negative    Protein, UA Negative Negative    Leuk Esterase, UA Negative Negative    Nitrite, UA Negative Negative     Urobilinogen, UA 4.0 E.U./dL (A) 0.2 - 1.0 E.U./dL   HIV-1 / O / 2 Ag / Antibody 4th Generation    Specimen: Blood   Result Value Ref Range    HIV-1/ HIV-2 Non-Reactive Non-Reactive   HSV 1 & 2 - Specific Antibody, IgG    Specimen: Blood   Result Value Ref Range    HSV 1 IgG, Type Specific 52.40 (H) 0.00 - 0.90 index    HSV 2 IgG <0.91 0.00 - 0.90 index   Hepatitis C Antibody    Specimen: Blood   Result Value Ref Range    Hepatitis C Ab Non-Reactive Non-Reactive   HSV Non-Specific Antibody, IgM    Specimen: Blood   Result Value Ref Range    HSVI/II IgM <0.91 0.00 - 0.90 Ratio   RPR    Specimen: Blood   Result Value Ref Range    RPR Non-Reactive Non-Reactive   Results for orders placed or performed in visit on 09/23/21   Gardnerella vaginalis, Trichomonas vaginalis, Candida albicans, DNA - Swab, Vagina    Specimen: Vagina; Swab   Result Value Ref Range    CANDIDA ALBICANS Negative Negative    GARDNERELLA VAGINALIS Negative Negative    TRICHOMONAS VAGINALIS Negative Negative   Hepatitis B Surface Antigen    Specimen: Blood   Result Value Ref Range    Hepatitis B Surface Ag Non-Reactive Non-Reactive   Results for orders placed or performed in visit on 08/18/21   Helicobacter Pylori, IgA IgG IgM    Specimen: Blood   Result Value Ref Range    H. pylori IgG 0.29 0.00 - 0.79 Index Value    H. pylori, IgA ABS <9.0 0.0 - 8.9 units    H. Pylori, IgM <9.0 0.0 - 8.9 units   EBVCA(IgG / M)    Specimen: Blood   Result Value Ref Range    EBV VCA IgM <36.0 0.0 - 35.9 U/mL    EBV VCA IgG 39.1 (H) 0.0 - 17.9 U/mL   CBC Auto Differential    Specimen: Blood   Result Value Ref Range    WBC 5.33 3.40 - 10.80 10*3/mm3    RBC 4.14 3.77 - 5.28 10*6/mm3    Hemoglobin 13.0 12.0 - 15.9 g/dL    Hematocrit 36.8 34.0 - 46.6 %    MCV 88.9 79.0 - 97.0 fL    MCH 31.4 26.6 - 33.0 pg    MCHC 35.3 31.5 - 35.7 g/dL    RDW 12.0 (L) 12.3 - 15.4 %    RDW-SD 38.2 37.0 - 54.0 fl    MPV 11.8 6.0 - 12.0 fL    Platelets 186 140 - 450 10*3/mm3    Neutrophil %  57.9 42.7 - 76.0 %    Lymphocyte % 34.1 19.6 - 45.3 %    Monocyte % 6.8 5.0 - 12.0 %    Eosinophil % 0.6 0.3 - 6.2 %    Basophil % 0.6 0.0 - 1.5 %    Neutrophils, Absolute 3.09 1.70 - 7.00 10*3/mm3    Lymphocytes, Absolute 1.82 0.70 - 3.10 10*3/mm3    Monocytes, Absolute 0.36 0.10 - 0.90 10*3/mm3    Eosinophils, Absolute 0.03 0.00 - 0.40 10*3/mm3    Basophils, Absolute 0.03 0.00 - 0.20 10*3/mm3   Iron and TIBC    Specimen: Blood   Result Value Ref Range    Iron 151 (H) 37 - 145 mcg/dL    Iron Saturation 44 20 - 50 %    Transferrin 231 200 - 360 mg/dL    TIBC 344 298 - 536 mcg/dL   Lipase    Specimen: Blood   Result Value Ref Range    Lipase 18 13 - 60 U/L   Folate    Specimen: Blood   Result Value Ref Range    Folate 7.86 4.78 - 24.20 ng/mL   Ferritin    Specimen: Blood   Result Value Ref Range    Ferritin 33.00 13.00 - 150.00 ng/mL   Vitamin B12    Specimen: Blood   Result Value Ref Range    Vitamin B-12 387 211 - 946 pg/mL   Amylase    Specimen: Blood   Result Value Ref Range    Amylase 64 30 - 110 U/L   Comprehensive metabolic panel    Specimen: Blood   Result Value Ref Range    Glucose 85 70 - 99 mg/dL    BUN 12 7 - 23 mg/dL    Creatinine 0.69 0.52 - 1.04 mg/dL    Sodium 138 137 - 145 mmol/L    Potassium 4.1 3.4 - 5.0 mmol/L    Chloride 105 101 - 112 mmol/L    CO2 28.0 22.0 - 30.0 mmol/L    Calcium 9.4 8.4 - 10.2 mg/dL    Total Protein 7.1 6.3 - 8.6 g/dL    Albumin 4.60 3.50 - 5.00 g/dL    ALT (SGPT) 12 <=35 U/L    AST (SGOT) 18 14 - 36 U/L    Alkaline Phosphatase 49 38 - 126 U/L    Total Bilirubin 0.5 0.2 - 1.3 mg/dL    eGFR Non  Amer 105 71 - 165 mL/min/1.73    Globulin 2.5 2.3 - 3.5 gm/dL    A/G Ratio 1.8 1.1 - 1.8 g/dL    BUN/Creatinine Ratio 17.4 7.0 - 25.0    Anion Gap 5.0 5.0 - 15.0 mmol/L   Results for orders placed or performed in visit on 08/10/21   NUT ALLERGENS (10)    Specimen: Blood   Result Value Ref Range    Class Description Comment     Peanut <0.10 Class 0 kU/L    Hazelnut <0.10 Class 0  kU/L    Brazil Nut <0.10 Class 0 kU/L    Almonds <0.10 Class 0 kU/L    Pecan Nut <0.10 Class 0 kU/L    Cashew <0.10 Class 0 kU/L    Pistachio <0.10 Class 0 kU/L    Zeyad (Pine Nut) <0.10 Class 0 kU/L    Victoria <0.10 Class 0 kU/L    Sweet Baxter <0.10 Class 0 kU/L     *Note: Due to a large number of results and/or encounters for the requested time period, some results have not been displayed. A complete set of results can be found in Results Review.

## 2022-03-26 ENCOUNTER — TRANSCRIBE ORDERS (OUTPATIENT)
Dept: CT IMAGING | Facility: HOSPITAL | Age: 27
End: 2022-03-26

## 2022-03-26 DIAGNOSIS — R10.0 ACUTE ABDOMEN: Primary | ICD-10-CM

## 2022-03-31 ENCOUNTER — HOSPITAL ENCOUNTER (OUTPATIENT)
Dept: CT IMAGING | Facility: HOSPITAL | Age: 27
Discharge: HOME | End: 2022-03-31
Admitting: NURSE PRACTITIONER

## 2022-03-31 DIAGNOSIS — R10.10 UPPER ABDOMINAL PAIN: ICD-10-CM

## 2022-03-31 PROCEDURE — 25010000002 IOPAMIDOL 61 % SOLUTION: Performed by: NURSE PRACTITIONER

## 2022-03-31 PROCEDURE — 74177 CT ABD & PELVIS W/CONTRAST: CPT

## 2022-03-31 RX ADMIN — IOPAMIDOL 90 ML: 612 INJECTION, SOLUTION INTRAVENOUS at 15:32

## 2022-04-01 DIAGNOSIS — R10.10 UPPER ABDOMINAL PAIN: ICD-10-CM

## 2022-04-01 DIAGNOSIS — R11.0 NAUSEA: ICD-10-CM

## 2022-04-08 ENCOUNTER — TELEPHONE (OUTPATIENT)
Dept: GASTROENTEROLOGY | Facility: CLINIC | Age: 27
End: 2022-04-08

## 2022-04-08 NOTE — TELEPHONE ENCOUNTER
Patient contacted to discuss results. No answer left voicemail of normal results and to return call with any questions

## 2022-11-14 ENCOUNTER — OFFICE VISIT (OUTPATIENT)
Dept: GASTROENTEROLOGY | Facility: CLINIC | Age: 27
End: 2022-11-14

## 2022-11-14 VITALS
BODY MASS INDEX: 22.64 KG/M2 | WEIGHT: 132.6 LBS | SYSTOLIC BLOOD PRESSURE: 107 MMHG | DIASTOLIC BLOOD PRESSURE: 74 MMHG | HEART RATE: 83 BPM | HEIGHT: 64 IN

## 2022-11-14 DIAGNOSIS — R10.10 UPPER ABDOMINAL PAIN: Primary | ICD-10-CM

## 2022-11-14 DIAGNOSIS — R10.13 EPIGASTRIC PAIN: ICD-10-CM

## 2022-11-14 DIAGNOSIS — K21.00 GASTROESOPHAGEAL REFLUX DISEASE WITH ESOPHAGITIS WITHOUT HEMORRHAGE: ICD-10-CM

## 2022-11-14 PROCEDURE — 99213 OFFICE O/P EST LOW 20 MIN: CPT | Performed by: NURSE PRACTITIONER

## 2022-11-14 RX ORDER — DEXTROSE AND SODIUM CHLORIDE 5; .45 G/100ML; G/100ML
30 INJECTION, SOLUTION INTRAVENOUS CONTINUOUS PRN
Status: CANCELLED | OUTPATIENT
Start: 2022-12-12

## 2022-11-14 RX ORDER — DEXLANSOPRAZOLE 60 MG/1
60 CAPSULE, DELAYED RELEASE ORAL DAILY
Qty: 30 CAPSULE | Refills: 5 | Status: SHIPPED | OUTPATIENT
Start: 2022-11-14 | End: 2022-11-18 | Stop reason: SDUPTHER

## 2022-11-14 NOTE — PROGRESS NOTES
Chief Complaint   Patient presents with   • Abdominal Pain   • Heartburn       Subjective    Toshia Barraza is a 27 y.o. female. she is here today for follow-up.                                                                  Assessment & Plan                                     1. Upper abdominal pain    2. Gastroesophageal reflux disease with esophagitis without hemorrhage    3. Epigastric pain      Plan; we will schedule patient for EGD due to worsening epigastric pain GERD and nausea.  Restart Dexilant recommend she avoid gastric irritants or any known triggers.  Recommend follow back up with PCP regarding concerned about hypoglycemic episodes.     Follow-up: Return in about 4 weeks (around 12/12/2022) for Recheck, After test.     HPI    27-year-old female presents to discuss worsening epigastric pain nausea when she eats sugary food and worsening reflux.  States she ran out of  About a month ago has not taken Carafate in some time is felt the full globus sensation and epigastric area.  She had CT which noted normal gallbladder liver 3/31/2022.  Pelvic congestion was noted and states she has discussed with Dr. Barrett.  Previous EGD was completed March 2021 noted esophagitis and gastritis.  At that time biopsy of duodenum esophagus and antrum were normal.  She has alpha gal and avoids all consumption of red meat.  She completed sucrose breath test which noted normal sucrase activity.  States when she drinks certain things like Pepsi she feels very dizzy and lightheaded or any high sugar content drink.    Review of Systems  Review of Systems   Constitutional: Positive for fatigue. Negative for activity change, appetite change, chills, diaphoresis, fever and unexpected weight change.   HENT: Negative for sore throat and trouble swallowing.    Respiratory: Negative for shortness of breath.   "  Gastrointestinal: Positive for abdominal pain (epigastric and left upper ) and nausea. Negative for abdominal distention, anal bleeding, blood in stool, constipation, diarrhea, rectal pain and vomiting.   Musculoskeletal: Negative for arthralgias.   Skin: Negative for pallor.   Neurological: Positive for dizziness (when she drinks anything with sugar ). Negative for light-headedness.       /74 (BP Location: Left arm)   Pulse 83   Ht 162.6 cm (64\")   Wt 60.1 kg (132 lb 9.6 oz)   BMI 22.76 kg/m²     Objective      Physical Exam  Constitutional:       General: She is not in acute distress.     Appearance: Normal appearance. She is normal weight. She is not ill-appearing.   HENT:      Head: Normocephalic and atraumatic.   Pulmonary:      Effort: Pulmonary effort is normal.   Abdominal:      General: Abdomen is flat. Bowel sounds are normal. There is no distension.      Palpations: Abdomen is soft. There is no mass.      Tenderness: There is abdominal tenderness in the epigastric area and left upper quadrant.   Neurological:      Mental Status: She is alert.               The following portions of the patient's history were reviewed and updated as appropriate:   Past Medical History:   Diagnosis Date   • Distorted body image    • Esophagitis 4/28/2019   • GERD (gastroesophageal reflux disease)    • Herpes simplex type 1 antibody positive      Past Surgical History:   Procedure Laterality Date   • BREAST AUGMENTATION      saline implants   • BREAST BIOPSY     • COLONOSCOPY N/A 4/10/2019    Procedure: COLONOSCOPY;  Surgeon: Aidan Alvarado MD;  Location: [facility] ENDOSCOPY;  Service: Gastroenterology   • ENDOSCOPY N/A 4/10/2019    Procedure: ESOPHAGOGASTRODUODENOSCOPY Wed/Fri;  Surgeon: Aidan Alvarado MD;  Location: [facility] ENDOSCOPY;  Service: Gastroenterology   • ENDOSCOPY N/A 6/2/2020    Procedure: ESOPHAGOGASTRODUODENOSCOPY possible dilation;  Surgeon: Aidan Alvarado MD;  Location: [facility] ENDOSCOPY;  " Service: Gastroenterology;  Laterality: N/A;   • ENDOSCOPY N/A 3/1/2021    Procedure: ESOPHAGOGASTRODUODENOSCOPY possible dilation;  Surgeon: Aidan Alvarado MD;  Location: [facility] ENDOSCOPY;  Service: Gastroenterology;  Laterality: N/A;   • US GUIDED FINE NEEDLE ASPIRATION  2021     Family History   Problem Relation Age of Onset   • Other Mother         hematologic disorder   • Heart disease Mother         ischemic heart disease   • Heart attack Mother    • Leukemia Mother    • Ovarian cancer Mother         questionable   • Heart disease Father    • Hypertension Father      OB History        2    Para   2    Term   1       1    AB        Living   2       SAB        IAB        Ectopic        Molar        Multiple        Live Births   2              Allergies   Allergen Reactions   • Beef-Derived Products Other (See Comments)     Alpha-gal   • Pork-Derived Products Other (See Comments)     Alpha-gal     Social History     Socioeconomic History   • Marital status:    Tobacco Use   • Smoking status: Never   • Smokeless tobacco: Never   Vaping Use   • Vaping Use: Never used   Substance and Sexual Activity   • Alcohol use: Yes     Comment: social   • Drug use: No   • Sexual activity: Defer     Current Medications:  Prior to Admission medications    Medication Sig Start Date End Date Taking? Authorizing Provider   albuterol sulfate  (90 Base) MCG/ACT inhaler Inhale 2 puffs Every 4 (Four) Hours As Needed for Wheezing or Shortness of Air. 21  Yes Ciera Chinchilla APRN   aspirin (aspirin) 81 MG EC tablet Take 1 tablet by mouth Daily. 21  Yes Ciera Chinchilla APRN   baclofen (LIORESAL) 10 MG tablet TAKE 1 TABLET BY MOUTH AT NIGHT AS NEEDED FOR MUSCLE SPASMS OR MUSCLE CRAMPS OR TENSION OR NECK PAIN 10/18/21  Yes Janet Tony APRN   cyanocobalamin 1000 MCG/ML injection Inject 1 mL into the appropriate muscle as directed by prescriber Every 28 (Twenty-Eight) Days. 20  " Yes Ciera Chinchilla APRN   dexlansoprazole (Dexilant) 60 MG capsule Take 1 capsule by mouth Daily. 3/21/22  Yes Aaliyah Arrieta APRN   escitalopram (LEXAPRO) 10 MG tablet  3/15/22  Yes Provider, MD Jane   propranolol (INDERAL) 40 MG tablet Take 1 tablet by mouth Daily. 8/18/21  Yes Janet Tony APRN   Syringe/Needle, Disp, 25G X 5/8\" 3 ML misc 1 syringe Every 30 (Thirty) Days. 9/11/20  Yes Ciera Chinchilla APRN   sucralfate (CARAFATE) 1 GM/10ML suspension SHAKE LIQUID AND TAKE 10 ML BY MOUTH FOUR TIMES DAILY AT BEDTIME WITH MEALS 3/8/21   Aaliyah Arrieta APRN     Orders placed during this encounter include:  Orders Placed This Encounter   Procedures   • Obtain Informed Consent     Standing Status:   Future     Order Specific Question:   Informed Consent Given For     Answer:   ESOPHAGOGASTRODUODENOSCOPY possible dilation     ESOPHAGOGASTRODUODENOSCOPY possible dilation (N/A)  New Medications Ordered This Visit   Medications   • dexlansoprazole (Dexilant) 60 MG capsule     Sig: Take 1 capsule by mouth Daily.     Dispense:  30 capsule     Refill:  5         Review and/or summary of lab tests, radiology, procedures, medications. Review and summary of old records and obtaining of history. The risks and benefits of my recommendations, as well as other treatment options were discussed . Any questions/concerned were answered. Patient voiced understanding and agreement.          This document has been electronically signed by LATRICE Pearce on November 14, 2022 14:34 CST                                               Results for orders placed or performed in visit on 09/28/21   Urinalysis, Microscopic Only - Urine, Clean Catch    Specimen: Urine, Clean Catch   Result Value Ref Range    RBC, UA 3-5 (A) None Seen /HPF    WBC, UA Too Numerous to Count (A) None Seen /HPF    Bacteria, UA 2+ (A) None Seen /HPF    Squamous Epithelial Cells, UA 3-6 (A) None Seen, 0-2 /HPF    Hyaline Casts, UA None Seen None " Seen /LPF    Methodology Manual Light Microscopy    Urinalysis With Culture If Indicated - Urine, Clean Catch    Specimen: Urine, Clean Catch   Result Value Ref Range    Color, UA Orange (A) Yellow, Straw    Appearance, UA Cloudy (A) Clear    pH, UA 5.5 5.5 - 8.0    Specific Gravity, UA 1.020 1.005 - 1.030    Glucose, UA Color obscures results (A) Negative    Ketones, UA Color obscures results (A) Negative    Bilirubin, UA Color obscures results (A) Negative    Blood, UA Color obscures results (A) Negative    Protein, UA Color obscures results (A) Negative    Leuk Esterase, UA Color obscures results (A) Negative    Nitrite, UA Color obscures results (A) Negative    Urobilinogen, UA Color obscures results (A) 0.2 - 1.0 E.U./dL   Urine Culture - Urine, Urine, Clean Catch    Specimen: Urine, Clean Catch   Result Value Ref Range    Urine Culture >100,000 CFU/mL Escherichia coli (A)        Susceptibility    Escherichia coli - REY     Ampicillin  Susceptible ug/ml     Ampicillin + Sulbactam  Susceptible ug/ml     Cefazolin  Susceptible ug/ml     Cefepime  Susceptible ug/ml     Ceftazidime  Susceptible ug/ml     Ceftriaxone  Susceptible ug/ml     Gentamicin  Susceptible ug/ml     Levofloxacin  Susceptible ug/ml     Nitrofurantoin  Susceptible ug/ml     Piperacillin + Tazobactam  Susceptible ug/ml     Tetracycline  Susceptible ug/ml     Trimethoprim + Sulfamethoxazole  Susceptible ug/ml   Results for orders placed or performed in visit on 09/23/21   Urinalysis With Culture If Indicated - Urine, Clean Catch    Specimen: Urine, Clean Catch   Result Value Ref Range    Color, UA Yellow Yellow, Straw    Appearance, UA Clear Clear    pH, UA 6.5 5.5 - 8.0    Specific Gravity, UA 1.025 1.005 - 1.030    Glucose, UA Negative Negative    Ketones, UA Trace (A) Negative    Bilirubin, UA Negative Negative    Blood, UA Negative Negative    Protein, UA Negative Negative    Leuk Esterase, UA Negative Negative    Nitrite, UA Negative  Negative    Urobilinogen, UA 4.0 E.U./dL (A) 0.2 - 1.0 E.U./dL   HIV-1 / O / 2 Ag / Antibody 4th Generation    Specimen: Blood   Result Value Ref Range    HIV-1/ HIV-2 Non-Reactive Non-Reactive   HSV 1 & 2 - Specific Antibody, IgG    Specimen: Blood   Result Value Ref Range    HSV 1 IgG, Type Specific 52.40 (H) 0.00 - 0.90 index    HSV 2 IgG <0.91 0.00 - 0.90 index   Hepatitis C Antibody    Specimen: Blood   Result Value Ref Range    Hepatitis C Ab Non-Reactive Non-Reactive   HSV Non-Specific Antibody, IgM    Specimen: Blood   Result Value Ref Range    HSVI/II IgM <0.91 0.00 - 0.90 Ratio   RPR    Specimen: Blood   Result Value Ref Range    RPR Non-Reactive Non-Reactive   Results for orders placed or performed in visit on 09/23/21   Gardnerella vaginalis, Trichomonas vaginalis, Candida albicans, DNA - Swab, Vagina    Specimen: Vagina; Swab   Result Value Ref Range    CANDIDA ALBICANS Negative Negative    GARDNERELLA VAGINALIS Negative Negative    TRICHOMONAS VAGINALIS Negative Negative   Hepatitis B Surface Antigen    Specimen: Blood   Result Value Ref Range    Hepatitis B Surface Ag Non-Reactive Non-Reactive   Results for orders placed or performed in visit on 08/18/21   Helicobacter Pylori, IgA IgG IgM    Specimen: Blood   Result Value Ref Range    H. pylori IgG 0.29 0.00 - 0.79 Index Value    H. pylori, IgA ABS <9.0 0.0 - 8.9 units    H. Pylori, IgM <9.0 0.0 - 8.9 units   EBVCA(IgG / M)    Specimen: Blood   Result Value Ref Range    EBV VCA IgM <36.0 0.0 - 35.9 U/mL    EBV VCA IgG 39.1 (H) 0.0 - 17.9 U/mL   CBC Auto Differential    Specimen: Blood   Result Value Ref Range    WBC 5.33 3.40 - 10.80 10*3/mm3    RBC 4.14 3.77 - 5.28 10*6/mm3    Hemoglobin 13.0 12.0 - 15.9 g/dL    Hematocrit 36.8 34.0 - 46.6 %    MCV 88.9 79.0 - 97.0 fL    MCH 31.4 26.6 - 33.0 pg    MCHC 35.3 31.5 - 35.7 g/dL    RDW 12.0 (L) 12.3 - 15.4 %    RDW-SD 38.2 37.0 - 54.0 fl    MPV 11.8 6.0 - 12.0 fL    Platelets 186 140 - 450 10*3/mm3     Neutrophil % 57.9 42.7 - 76.0 %    Lymphocyte % 34.1 19.6 - 45.3 %    Monocyte % 6.8 5.0 - 12.0 %    Eosinophil % 0.6 0.3 - 6.2 %    Basophil % 0.6 0.0 - 1.5 %    Neutrophils, Absolute 3.09 1.70 - 7.00 10*3/mm3    Lymphocytes, Absolute 1.82 0.70 - 3.10 10*3/mm3    Monocytes, Absolute 0.36 0.10 - 0.90 10*3/mm3    Eosinophils, Absolute 0.03 0.00 - 0.40 10*3/mm3    Basophils, Absolute 0.03 0.00 - 0.20 10*3/mm3   Iron and TIBC    Specimen: Blood   Result Value Ref Range    Iron 151 (H) 37 - 145 mcg/dL    Iron Saturation 44 20 - 50 %    Transferrin 231 200 - 360 mg/dL    TIBC 344 298 - 536 mcg/dL   Lipase    Specimen: Blood   Result Value Ref Range    Lipase 18 13 - 60 U/L   Folate    Specimen: Blood   Result Value Ref Range    Folate 7.86 4.78 - 24.20 ng/mL   Ferritin    Specimen: Blood   Result Value Ref Range    Ferritin 33.00 13.00 - 150.00 ng/mL   Vitamin B12    Specimen: Blood   Result Value Ref Range    Vitamin B-12 387 211 - 946 pg/mL   Amylase    Specimen: Blood   Result Value Ref Range    Amylase 64 30 - 110 U/L   Comprehensive metabolic panel    Specimen: Blood   Result Value Ref Range    Glucose 85 70 - 99 mg/dL    BUN 12 7 - 23 mg/dL    Creatinine 0.69 0.52 - 1.04 mg/dL    Sodium 138 137 - 145 mmol/L    Potassium 4.1 3.4 - 5.0 mmol/L    Chloride 105 101 - 112 mmol/L    CO2 28.0 22.0 - 30.0 mmol/L    Calcium 9.4 8.4 - 10.2 mg/dL    Total Protein 7.1 6.3 - 8.6 g/dL    Albumin 4.60 3.50 - 5.00 g/dL    ALT (SGPT) 12 <=35 U/L    AST (SGOT) 18 14 - 36 U/L    Alkaline Phosphatase 49 38 - 126 U/L    Total Bilirubin 0.5 0.2 - 1.3 mg/dL    eGFR Non  Amer 105 71 - 165 mL/min/1.73    Globulin 2.5 2.3 - 3.5 gm/dL    A/G Ratio 1.8 1.1 - 1.8 g/dL    BUN/Creatinine Ratio 17.4 7.0 - 25.0    Anion Gap 5.0 5.0 - 15.0 mmol/L   Results for orders placed or performed in visit on 08/10/21   NUT ALLERGENS (10)    Specimen: Blood   Result Value Ref Range    Class Description Comment     Peanut <0.10 Class 0 kU/L    Hazelnut  <0.10 Class 0 kU/L    Brazil Nut <0.10 Class 0 kU/L    Almonds <0.10 Class 0 kU/L    Pecan Nut <0.10 Class 0 kU/L    Cashew <0.10 Class 0 kU/L    Pistachio <0.10 Class 0 kU/L    [place] (Pine Nut) <0.10 Class 0 kU/L    [place] <0.10 Class 0 kU/L    [place] <0.10 Class 0 kU/L     *Note: Due to a large number of results and/or encounters for the requested time period, some results have not been displayed. A complete set of results can be found in Results Review.

## 2022-11-18 RX ORDER — DEXLANSOPRAZOLE 60 MG/1
60 CAPSULE, DELAYED RELEASE ORAL DAILY
Qty: 30 CAPSULE | Refills: 5 | Status: SHIPPED | OUTPATIENT
Start: 2022-11-18 | End: 2022-12-16 | Stop reason: SDUPTHER

## 2022-12-12 ENCOUNTER — ANESTHESIA EVENT (OUTPATIENT)
Dept: GASTROENTEROLOGY | Facility: HOSPITAL | Age: 27
End: 2022-12-12

## 2022-12-12 ENCOUNTER — ANESTHESIA (OUTPATIENT)
Dept: GASTROENTEROLOGY | Facility: HOSPITAL | Age: 27
End: 2022-12-12

## 2022-12-12 ENCOUNTER — HOSPITAL ENCOUNTER (OUTPATIENT)
Facility: HOSPITAL | Age: 27
Setting detail: HOSPITAL OUTPATIENT SURGERY
Discharge: HOME | End: 2022-12-12
Attending: INTERNAL MEDICINE | Admitting: INTERNAL MEDICINE

## 2022-12-12 VITALS
HEIGHT: 64 IN | TEMPERATURE: 97.8 F | BODY MASS INDEX: 22.77 KG/M2 | SYSTOLIC BLOOD PRESSURE: 97 MMHG | OXYGEN SATURATION: 99 % | RESPIRATION RATE: 16 BRPM | HEART RATE: 76 BPM | DIASTOLIC BLOOD PRESSURE: 56 MMHG | WEIGHT: 133.4 LBS

## 2022-12-12 DIAGNOSIS — R10.10 UPPER ABDOMINAL PAIN: ICD-10-CM

## 2022-12-12 DIAGNOSIS — R10.13 EPIGASTRIC PAIN: ICD-10-CM

## 2022-12-12 DIAGNOSIS — K21.00 GASTROESOPHAGEAL REFLUX DISEASE WITH ESOPHAGITIS WITHOUT HEMORRHAGE: ICD-10-CM

## 2022-12-12 PROCEDURE — 43239 EGD BIOPSY SINGLE/MULTIPLE: CPT | Performed by: INTERNAL MEDICINE

## 2022-12-12 PROCEDURE — 88305 TISSUE EXAM BY PATHOLOGIST: CPT

## 2022-12-12 PROCEDURE — 25010000002 PROPOFOL 10 MG/ML EMULSION: Performed by: NURSE ANESTHETIST, CERTIFIED REGISTERED

## 2022-12-12 RX ORDER — DEXTROSE AND SODIUM CHLORIDE 5; .45 G/100ML; G/100ML
30 INJECTION, SOLUTION INTRAVENOUS CONTINUOUS PRN
Status: DISCONTINUED | OUTPATIENT
Start: 2022-12-12 | End: 2022-12-12 | Stop reason: HOSPADM

## 2022-12-12 RX ORDER — PROPOFOL 10 MG/ML
VIAL (ML) INTRAVENOUS AS NEEDED
Status: DISCONTINUED | OUTPATIENT
Start: 2022-12-12 | End: 2022-12-12 | Stop reason: SURG

## 2022-12-12 RX ADMIN — DEXTROSE AND SODIUM CHLORIDE 30 ML/HR: 5; 450 INJECTION, SOLUTION INTRAVENOUS at 13:16

## 2022-12-12 RX ADMIN — PROPOFOL 100 MG: 10 INJECTION, EMULSION INTRAVENOUS at 14:32

## 2022-12-12 RX ADMIN — PROPOFOL 40 MG: 10 INJECTION, EMULSION INTRAVENOUS at 14:34

## 2022-12-12 NOTE — ANESTHESIA PREPROCEDURE EVALUATION
Anesthesia Evaluation     Patient summary reviewed and Nursing notes reviewed   NPO Solid Status: > 8 hours  NPO Liquid Status: > 2 hours           Airway   Mallampati: II  TM distance: >3 FB  Neck ROM: full  No difficulty expected  Dental - normal exam     Pulmonary - normal exam   Cardiovascular - normal exam  Exercise tolerance: good (4-7 METS)    (+) valvular problems/murmurs,     ROS comment: Per patient, she has leaky valve     Neuro/Psych  (+) headaches, dizziness/light headedness, numbness, psychiatric history Anxiety,    GI/Hepatic/Renal/Endo    (+)  GERD well controlled,      Musculoskeletal     (+) neck pain,   Abdominal  - normal exam   Substance History   (+) alcohol use (occasional),      OB/GYN negative ob/gyn ROS   (-)  Pregnant    Comment: Signed declination form        Other                          Anesthesia Plan    ASA 2     general   total IV anesthesia  intravenous induction     Anesthetic plan, risks, benefits, and alternatives have been provided, discussed and informed consent has been obtained with: patient.

## 2022-12-12 NOTE — ANESTHESIA POSTPROCEDURE EVALUATION
Patient: Toshia Barraza    Procedure Summary     Date: 12/12/22 Room / Location: [facility] ENDOSCOPY 1 / [facility] ENDOSCOPY    Anesthesia Start: 1430 Anesthesia Stop: 1438    Procedure: ESOPHAGOGASTRODUODENOSCOPY possible dilation Diagnosis:       Upper abdominal pain      Gastroesophageal reflux disease with esophagitis without hemorrhage      Epigastric pain      (Upper abdominal pain [R10.10])      (Gastroesophageal reflux disease with esophagitis without hemorrhage [K21.00])      (Epigastric pain [R10.13])    Surgeons: Aidan Alvarado MD Provider: Max Martinez CRNA    Anesthesia Type: general ASA Status: 2          Anesthesia Type: general    Vitals  No vitals data found for the desired time range.          Post Anesthesia Care and Evaluation    Patient location during evaluation: bedside  Patient participation: waiting for patient participation  Level of consciousness: responsive to verbal stimuli  Pain management: adequate    Airway patency: patent  Anesthetic complications: No anesthetic complications  PONV Status: none  Cardiovascular status: acceptable  Respiratory status: acceptable  Hydration status: acceptable    Comments: ---------------------------               12/12/22                      1309       1428  ---------------------------   BP:          121/70       102/62   Pulse:         72        80   Resp:          16        17   Temp:   97.7 °F (36.5 °C) 97.6   SpO2:         100%       99%  ---------------------------

## 2022-12-14 LAB — REF LAB TEST METHOD: NORMAL

## 2022-12-16 RX ORDER — DEXLANSOPRAZOLE 60 MG/1
60 CAPSULE, DELAYED RELEASE ORAL DAILY
Qty: 30 CAPSULE | Refills: 5 | Status: SHIPPED | OUTPATIENT
Start: 2022-12-16 | End: 2023-01-04

## 2023-01-04 ENCOUNTER — OFFICE VISIT (OUTPATIENT)
Dept: GASTROENTEROLOGY | Facility: CLINIC | Age: 28
End: 2023-01-04
Payer: COMMERCIAL

## 2023-01-04 VITALS
SYSTOLIC BLOOD PRESSURE: 119 MMHG | HEIGHT: 64 IN | BODY MASS INDEX: 22.74 KG/M2 | HEART RATE: 82 BPM | WEIGHT: 133.2 LBS | DIASTOLIC BLOOD PRESSURE: 70 MMHG

## 2023-01-04 DIAGNOSIS — K21.00 GASTROESOPHAGEAL REFLUX DISEASE WITH ESOPHAGITIS WITHOUT HEMORRHAGE: Primary | ICD-10-CM

## 2023-01-04 PROCEDURE — 99213 OFFICE O/P EST LOW 20 MIN: CPT | Performed by: NURSE PRACTITIONER

## 2023-01-04 RX ORDER — DEXLANSOPRAZOLE 60 MG/1
60 CAPSULE, DELAYED RELEASE ORAL DAILY
Qty: 30 CAPSULE | Refills: 11 | Status: SHIPPED | OUTPATIENT
Start: 2023-01-04 | End: 2023-07-03

## 2023-01-04 NOTE — PROGRESS NOTES
Chief Complaint   Patient presents with   • Follow-up       Subjective    Toshia Barraza is a 27 y.o. female. she is here today for follow-up.                                                                  Assessment & Plan                                     1. Gastroesophageal reflux disease with esophagitis without hemorrhage      Continue Dexilant daily encouraged to avoid gastric irritants follow standard antireflux measures.  Follow-up in 6 months for recheck return office sooner if needed    Follow-up: Return in about 6 months (around 7/4/2023) for Recheck.     HPI    27-year-old female presents for follow-up after EGD and to discuss chronic GERD.  Reports when she takes Dexilant symptoms are well controlled however insurance has not been covering Dexilant so she has been without medication.  In the past she has tried Prilosec Protonix and Nexium without good control of symptoms.  Since being on Dexilant EGD has noted improvement of esophageal mucosa.  EGD 12/12/2022 noted grade 2 esophagitis gastritis and normal duodenum.  Antrum biopsy noted mild chronic inactive gastritis negative for H. pylori.  Esophageal biopsy noted squamous mucosa with no significant histopathology negative for metaplasia or intraepithelial eosinophils.    Review of Systems  Review of Systems   Constitutional: Negative for activity change, appetite change, chills, diaphoresis, fatigue, fever and unexpected weight change.   HENT: Negative for sore throat and trouble swallowing.    Respiratory: Negative for shortness of breath.    Gastrointestinal: Positive for abdominal pain (epigastric since unable to take medications ). Negative for abdominal distention, anal bleeding, blood in stool, constipation, diarrhea, nausea, rectal pain and vomiting.   Musculoskeletal: Negative for arthralgias.   Skin: Negative for pallor.    Neurological: Negative for light-headedness.       /70 (BP Location: Left arm)   Pulse 82   Ht 162.6 cm (64\")   Wt 60.4 kg (133 lb 3.2 oz)   LMP 12/06/2022   BMI 22.86 kg/m²     Objective      Physical Exam  Constitutional:       General: She is not in acute distress.     Appearance: Normal appearance. She is normal weight. She is not ill-appearing.   HENT:      Head: Normocephalic and atraumatic.   Pulmonary:      Effort: Pulmonary effort is normal.   Abdominal:      General: Abdomen is flat. Bowel sounds are normal. There is no distension.      Palpations: Abdomen is soft. There is no mass.      Tenderness: There is no abdominal tenderness.   Neurological:      Mental Status: She is alert.               The following portions of the patient's history were reviewed and updated as appropriate:   Past Medical History:   Diagnosis Date   • Distorted body image    • Esophagitis 04/28/2019   • GERD (gastroesophageal reflux disease)    • Herpes simplex type 1 antibody positive      Past Surgical History:   Procedure Laterality Date   • BREAST AUGMENTATION      saline implants   • BREAST BIOPSY     • COLONOSCOPY N/A 04/10/2019    Procedure: COLONOSCOPY;  Surgeon: Aidan Alvarado MD;  Location: [facility] ENDOSCOPY;  Service: Gastroenterology   • ENDOSCOPY N/A 04/10/2019    Procedure: ESOPHAGOGASTRODUODENOSCOPY Wed/Fri;  Surgeon: Aidan Alvarado MD;  Location: [facility] ENDOSCOPY;  Service: Gastroenterology   • ENDOSCOPY N/A 06/02/2020    Procedure: ESOPHAGOGASTRODUODENOSCOPY possible dilation;  Surgeon: Aidan Alvarado MD;  Location: [facility] ENDOSCOPY;  Service: Gastroenterology;  Laterality: N/A;   • ENDOSCOPY N/A 03/01/2021    Procedure: ESOPHAGOGASTRODUODENOSCOPY possible dilation;  Surgeon: Aidan Alvarado MD;  Location: [facility] ENDOSCOPY;  Service: Gastroenterology;  Laterality: N/A;   • ENDOSCOPY N/A 12/12/2022    Procedure: ESOPHAGOGASTRODUODENOSCOPY possible dilation;  Surgeon: Aidan Alvarado MD;   Location: [facility] ENDOSCOPY;  Service: Gastroenterology;  Laterality: N/A;   • US GUIDED FINE NEEDLE ASPIRATION  2021     Family History   Problem Relation Age of Onset   • Other Mother         hematologic disorder   • Heart disease Mother         ischemic heart disease   • Heart attack Mother    • Leukemia Mother    • Ovarian cancer Mother         questionable   • Heart disease Father    • Hypertension Father      OB History        2    Para   2    Term   1       1    AB        Living   2       SAB        IAB        Ectopic        Molar        Multiple        Live Births   2              Allergies   Allergen Reactions   • Beef-Derived Products Other (See Comments)     Alpha-gal   • Pork-Derived Products Other (See Comments)     Alpha-gal     Social History     Socioeconomic History   • Marital status:    Tobacco Use   • Smoking status: Never   • Smokeless tobacco: Never   Vaping Use   • Vaping Use: Never used   Substance and Sexual Activity   • Alcohol use: Yes     Comment: social   • Drug use: No   • Sexual activity: Defer     Current Medications:  Prior to Admission medications    Medication Sig Start Date End Date Taking? Authorizing Provider   aspirin (aspirin) 81 MG EC tablet Take 1 tablet by mouth Daily. 21  Yes Ciera Chinchilla APRN   baclofen (LIORESAL) 10 MG tablet TAKE 1 TABLET BY MOUTH AT NIGHT AS NEEDED FOR MUSCLE SPASMS OR MUSCLE CRAMPS OR TENSION OR NECK PAIN 10/18/21  Yes Janet Tony APRN   cyanocobalamin 1000 MCG/ML injection Inject 1 mL into the appropriate muscle as directed by prescriber Every 28 (Twenty-Eight) Days. 20  Yes Ciera Chinchilla APRN   escitalopram (LEXAPRO) 10 MG tablet  3/15/22  Yes Provider, MD Jane   propranolol (INDERAL) 40 MG tablet Take 1 tablet by mouth Daily. 21  Yes Janet Tony APRN   Syringe/Needle, Disp, 25G X 5/8\" 3 ML misc 1 syringe Every 30 (Thirty) Days. 20  Yes Ciera Chinchilla APRN    Dexilant 60 MG capsule Take 1 capsule by mouth Daily. 22 Yes Aaliyah Arrieta APRN   dexlansoprazole (DEXILANT) 60 MG capsule Take 1 capsule by mouth Daily for 180 days. 1/4/23 7/3/23  Aaliyah Arritea APRN     Orders placed during this encounter include:  No orders of the defined types were placed in this encounter.    * Surgery not found *  New Medications Ordered This Visit   Medications   • dexlansoprazole (DEXILANT) 60 MG capsule     Sig: Take 1 capsule by mouth Daily for 180 days.     Dispense:  30 capsule     Refill:  11     Pre auth reference number [id number]         Review and/or summary of lab tests, radiology, procedures, medications. Review and summary of old records and obtaining of history. The risks and benefits of my recommendations, as well as other treatment options were discussed . Any questions/concerned were answered. Patient voiced understanding and agreement.          This document has been electronically signed by LATRICE Pearce on 2023 09:52 CST                                               Results for orders placed or performed during the hospital encounter of 22   TISSUE EXAM, [facility] ([facility])    Specimen: A: Gastric, Antrum; Tissue    B: Esophagus, Distal; Tissue   Result Value Ref Range    Reference Lab Report       [facility]  [address] [place] [address]  Phone: [phone] or [phone]  Fax: [phone]  Jeffrey Oneal M.D., Medical Director    PATIENT NAME                                     LABORATORY NO.  [id number]   FRANCISCO LUEVANO.                            [id number]  [id number]                                 AGE                    SEX   SSN              CLIENT REF #  [facility]                   27        1995      F     [id number]      [id number]    [place]                               REQUESTING M.D.           ATTENDING M.D.         COPY TO.  [address] [place] [address]                          DURAN PAPPAS             [place] [address]                     DATE COLLECTED            DATE RECEIVED          DATE REPORTED  12/12/2022 12/13/2022 12/14/2022    DIAGNOSIS:  A.     GASTRIC ANTRUM, BIOPSY:  Antral mucosa with mild chronic inactive gastritis.  No H. pylori organisms  are identified on H&E-stained sections.  B.     ESOPHAGUS, BIOPSY, DISTAL:  Squamous mucosa and columnar mucosa with no significant histopathology.  Negative for intestinal metaplasia and intraepithelial eosinophils.      CLINICAL HISTORY:  Upper abdominal pain, gastroesophageal reflux disease with esophagitis without  hemorrhage, epigastric pain    SPECIMENS RECEIVED:  A.    GASTRIC ANTRUM, BIOPSY  B.    ESOPHAGUS, BIOPSY, DISTAL    MICROSCOPIC DESCRIPTION:  Tissue blocks are prepared and slides are examined microscopically on all  specimens. See diagnosis for details.    Professional interpretation rendered by Shorty Mccall M.D., CELSA.C.A.P. at [employer], [address] [place] [address].    GROSS DESCRIPTION:  A.    Specimen is received in 1 formalin filled container labeled \"antrum\"  consists of 3 pieces of tan-gray soft tissue measuring 0.8 x 0.3 x 0.2 cm in  aggregate.  All submitted in 1 cassette.  BKO  B.    Specimen is received in 1 formalin filled container labeled  \"distal\" consists  of multiple pieces of tan-gray soft tissue measuring 0.7 x 0.3 x 0.1 cm in  aggregate.  All submitted in 1 cassette.  BKO    REVIEWED, DIAGNOSED AND ELECTRONICALLY  SIGNED BY:    Shorty Mccall M.D., F.C.A.P.  CPT CODES:  88305x2     Results for orders placed or performed in visit on 09/28/21   Urinalysis, Microscopic Only - Urine, Clean Catch    Specimen: Urine, Clean Catch   Result Value Ref Range    RBC, UA 3-5 (A) None Seen /HPF    WBC, UA Too Numerous to Count (A) None Seen /HPF    Bacteria, UA 2+ (A) None Seen /HPF    Squamous Epithelial Cells, UA 3-6 (A) None Seen,  0-2 /HPF    Hyaline Casts, UA None Seen None Seen /LPF    Methodology Manual Light Microscopy    Urinalysis With Culture If Indicated - Urine, Clean Catch    Specimen: Urine, Clean Catch   Result Value Ref Range    Color, UA Orange (A) Yellow, Straw    Appearance, UA Cloudy (A) Clear    pH, UA 5.5 5.5 - 8.0    Specific Gravity, UA 1.020 1.005 - 1.030    Glucose, UA Color obscures results (A) Negative    Ketones, UA Color obscures results (A) Negative    Bilirubin, UA Color obscures results (A) Negative    Blood, UA Color obscures results (A) Negative    Protein, UA Color obscures results (A) Negative    Leuk Esterase, UA Color obscures results (A) Negative    Nitrite, UA Color obscures results (A) Negative    Urobilinogen, UA Color obscures results (A) 0.2 - 1.0 E.U./dL   Urine Culture - Urine, Urine, Clean Catch    Specimen: Urine, Clean Catch   Result Value Ref Range    Urine Culture >100,000 CFU/mL Escherichia coli (A)        Susceptibility    Escherichia coli - REY     Ampicillin  Susceptible ug/ml     Ampicillin + Sulbactam  Susceptible ug/ml     Cefazolin  Susceptible ug/ml     Cefepime  Susceptible ug/ml     Ceftazidime  Susceptible ug/ml     Ceftriaxone  Susceptible ug/ml     Gentamicin  Susceptible ug/ml     Levofloxacin  Susceptible ug/ml     Nitrofurantoin  Susceptible ug/ml     Piperacillin + Tazobactam  Susceptible ug/ml     Tetracycline  Susceptible ug/ml     Trimethoprim + Sulfamethoxazole  Susceptible ug/ml   Results for orders placed or performed in visit on 09/23/21   Urinalysis With Culture If Indicated - Urine, Clean Catch    Specimen: Urine, Clean Catch   Result Value Ref Range    Color, UA Yellow Yellow, Straw    Appearance, UA Clear Clear    pH, UA 6.5 5.5 - 8.0    Specific Gravity, UA 1.025 1.005 - 1.030    Glucose, UA Negative Negative    Ketones, UA Trace (A) Negative    Bilirubin, UA Negative Negative    Blood, UA Negative Negative    Protein, UA Negative Negative    Leuk Esterase, UA  Negative Negative    Nitrite, UA Negative Negative    Urobilinogen, UA 4.0 E.U./dL (A) 0.2 - 1.0 E.U./dL   HIV-1 / O / 2 Ag / Antibody 4th Generation    Specimen: Blood   Result Value Ref Range    HIV-1/ HIV-2 Non-Reactive Non-Reactive   HSV 1 & 2 - Specific Antibody, IgG    Specimen: Blood   Result Value Ref Range    HSV 1 IgG, Type Specific 52.40 (H) 0.00 - 0.90 index    HSV 2 IgG <0.91 0.00 - 0.90 index   Hepatitis C Antibody    Specimen: Blood   Result Value Ref Range    Hepatitis C Ab Non-Reactive Non-Reactive   HSV Non-Specific Antibody, IgM    Specimen: Blood   Result Value Ref Range    HSVI/II IgM <0.91 0.00 - 0.90 Ratio   RPR    Specimen: Blood   Result Value Ref Range    RPR Non-Reactive Non-Reactive   Results for orders placed or performed in visit on 09/23/21   Gardnerella vaginalis, Trichomonas vaginalis, Candida albicans, DNA - Swab, Vagina    Specimen: Vagina; Swab   Result Value Ref Range    CANDIDA ALBICANS Negative Negative    GARDNERELLA VAGINALIS Negative Negative    TRICHOMONAS VAGINALIS Negative Negative   Hepatitis B Surface Antigen    Specimen: Blood   Result Value Ref Range    Hepatitis B Surface Ag Non-Reactive Non-Reactive   Results for orders placed or performed in visit on 08/18/21   Helicobacter Pylori, IgA IgG IgM    Specimen: Blood   Result Value Ref Range    H. pylori IgG 0.29 0.00 - 0.79 Index Value    H. pylori, IgA ABS <9.0 0.0 - 8.9 units    H. Pylori, IgM <9.0 0.0 - 8.9 units   EBVCA(IgG / M)    Specimen: Blood   Result Value Ref Range    EBV VCA IgM <36.0 0.0 - 35.9 U/mL    EBV VCA IgG 39.1 (H) 0.0 - 17.9 U/mL   CBC Auto Differential    Specimen: Blood   Result Value Ref Range    WBC 5.33 3.40 - 10.80 10*3/mm3    RBC 4.14 3.77 - 5.28 10*6/mm3    Hemoglobin 13.0 12.0 - 15.9 g/dL    Hematocrit 36.8 34.0 - 46.6 %    MCV 88.9 79.0 - 97.0 fL    MCH 31.4 26.6 - 33.0 pg    MCHC 35.3 31.5 - 35.7 g/dL    RDW 12.0 (L) 12.3 - 15.4 %    RDW-SD 38.2 37.0 - 54.0 fl    MPV 11.8 6.0 - 12.0 fL     Platelets 186 140 - 450 10*3/mm3    Neutrophil % 57.9 42.7 - 76.0 %    Lymphocyte % 34.1 19.6 - 45.3 %    Monocyte % 6.8 5.0 - 12.0 %    Eosinophil % 0.6 0.3 - 6.2 %    Basophil % 0.6 0.0 - 1.5 %    Neutrophils, Absolute 3.09 1.70 - 7.00 10*3/mm3    Lymphocytes, Absolute 1.82 0.70 - 3.10 10*3/mm3    Monocytes, Absolute 0.36 0.10 - 0.90 10*3/mm3    Eosinophils, Absolute 0.03 0.00 - 0.40 10*3/mm3    Basophils, Absolute 0.03 0.00 - 0.20 10*3/mm3   Iron and TIBC    Specimen: Blood   Result Value Ref Range    Iron 151 (H) 37 - 145 mcg/dL    Iron Saturation 44 20 - 50 %    Transferrin 231 200 - 360 mg/dL    TIBC 344 298 - 536 mcg/dL   Lipase    Specimen: Blood   Result Value Ref Range    Lipase 18 13 - 60 U/L   Folate    Specimen: Blood   Result Value Ref Range    Folate 7.86 4.78 - 24.20 ng/mL   Ferritin    Specimen: Blood   Result Value Ref Range    Ferritin 33.00 13.00 - 150.00 ng/mL   Vitamin B12    Specimen: Blood   Result Value Ref Range    Vitamin B-12 387 211 - 946 pg/mL   Amylase    Specimen: Blood   Result Value Ref Range    Amylase 64 30 - 110 U/L   Comprehensive metabolic panel    Specimen: Blood   Result Value Ref Range    Glucose 85 70 - 99 mg/dL    BUN 12 7 - 23 mg/dL    Creatinine 0.69 0.52 - 1.04 mg/dL    Sodium 138 137 - 145 mmol/L    Potassium 4.1 3.4 - 5.0 mmol/L    Chloride 105 101 - 112 mmol/L    CO2 28.0 22.0 - 30.0 mmol/L    Calcium 9.4 8.4 - 10.2 mg/dL    Total Protein 7.1 6.3 - 8.6 g/dL    Albumin 4.60 3.50 - 5.00 g/dL    ALT (SGPT) 12 <=35 U/L    AST (SGOT) 18 14 - 36 U/L    Alkaline Phosphatase 49 38 - 126 U/L    Total Bilirubin 0.5 0.2 - 1.3 mg/dL    eGFR Non  Amer 105 71 - 165 mL/min/1.73    Globulin 2.5 2.3 - 3.5 gm/dL    A/G Ratio 1.8 1.1 - 1.8 g/dL    BUN/Creatinine Ratio 17.4 7.0 - 25.0    Anion Gap 5.0 5.0 - 15.0 mmol/L   Results for orders placed or performed in visit on 08/10/21   NUT ALLERGENS (10)    Specimen: Blood   Result Value Ref Range    Class Description Comment      Peanut <0.10 Class 0 kU/L    Hazelnut <0.10 Class 0 kU/L    Brazil Nut <0.10 Class 0 kU/L    Almonds <0.10 Class 0 kU/L    Pecan Nut <0.10 Class 0 kU/L    Cashew <0.10 Class 0 kU/L    Pistachio <0.10 Class 0 kU/L    [place] (Pine Nut) <0.10 Class 0 kU/L    Connell <0.10 Class 0 kU/L    [place] <0.10 Class 0 kU/L     *Note: Due to a large number of results and/or encounters for the requested time period, some results have not been displayed. A complete set of results can be found in Results Review.

## 2023-03-16 ENCOUNTER — OFFICE VISIT (OUTPATIENT)
Dept: OBSTETRICS AND GYNECOLOGY | Facility: CLINIC | Age: 28
End: 2023-03-16
Payer: COMMERCIAL

## 2023-03-16 VITALS
HEIGHT: 64 IN | SYSTOLIC BLOOD PRESSURE: 112 MMHG | WEIGHT: 131 LBS | DIASTOLIC BLOOD PRESSURE: 68 MMHG | BODY MASS INDEX: 22.36 KG/M2

## 2023-03-16 DIAGNOSIS — Z32.02 NEGATIVE PREGNANCY TEST: Primary | ICD-10-CM

## 2023-03-16 DIAGNOSIS — N76.0 ACUTE VAGINITIS: ICD-10-CM

## 2023-03-16 LAB
B-HCG UR QL: NEGATIVE
CANDIDA ALBICANS: POSITIVE
EXPIRATION DATE: NORMAL
GARDNERELLA VAGINALIS: POSITIVE
INTERNAL NEGATIVE CONTROL: NEGATIVE
INTERNAL POSITIVE CONTROL: POSITIVE
Lab: NORMAL
T VAGINALIS DNA VAG QL PROBE+SIG AMP: NEGATIVE

## 2023-03-16 PROCEDURE — 87480 CANDIDA DNA DIR PROBE: CPT

## 2023-03-16 PROCEDURE — 87510 GARDNER VAG DNA DIR PROBE: CPT

## 2023-03-16 PROCEDURE — 99213 OFFICE O/P EST LOW 20 MIN: CPT

## 2023-03-16 PROCEDURE — 81025 URINE PREGNANCY TEST: CPT

## 2023-03-16 PROCEDURE — 87660 TRICHOMONAS VAGIN DIR PROBE: CPT

## 2023-03-16 RX ORDER — METRONIDAZOLE 500 MG/1
500 TABLET ORAL 2 TIMES DAILY
Qty: 14 TABLET | Refills: 0 | Status: SHIPPED | OUTPATIENT
Start: 2023-03-16 | End: 2023-03-23

## 2023-03-16 RX ORDER — FLUCONAZOLE 150 MG/1
TABLET ORAL
Qty: 2 TABLET | Refills: 0 | Status: SHIPPED | OUTPATIENT
Start: 2023-03-16

## 2023-03-16 NOTE — PROGRESS NOTES
"Subjective   Toshia Barraza is a 28 y.o. Heavy bleeding and BV issues    History of Present Illness  LMP: 3/15/23  Pap: 5/14/21, nl    Patient presents today for her period being 2 weeks late, with some negative and some positive faint at home pregnancy tests. However, she started bleeding yesterday. Her bleeding has been heavy and with associated cramping. Reports she has had a lot of new stressors in her life. \"I have been really stressed\". In office UPT is negative.   She reports that she has recurrent BV. Reports that Dr. Barrett would just call in medications when she thought she had BV to treat it. Last positive was on a OneSwab on 10/14/21. States she has had BV 3-4 times within the last year. Reports if she drinks to much coffee or changes soaps she will get BV. Denies any new sexual partners. She does use Boric Acid suppositories, however, she stopped because she thought she could be pregnant.       Vaginitis  This is a recurrent problem. Pertinent negatives include no abdominal pain, chest pain, chills, coughing, fatigue, fever, headaches, nausea, urinary symptoms or vomiting.       The following portions of the patient's history were reviewed and updated as appropriate: allergies, current medications, past family history, past medical history, past social history, past surgical history and problem list.    Review of Systems   Constitutional: Negative for appetite change, chills, fatigue and fever.   Respiratory: Negative for apnea, cough, choking, chest tightness, shortness of breath, wheezing and stridor.    Cardiovascular: Negative for chest pain.   Gastrointestinal: Negative for abdominal pain, constipation, diarrhea, nausea and vomiting.   Genitourinary: Positive for menstrual problem. Negative for amenorrhea, breast discharge, breast lump, breast pain, decreased libido, decreased urine volume, difficulty urinating, dyspareunia, dysuria, flank pain, frequency, genital sores, hematuria, pelvic " pain, pelvic pressure, urgency, urinary incontinence, vaginal bleeding, vaginal discharge and vaginal pain.       Objective   Physical Exam  Vitals reviewed.   Constitutional:       General: She is awake. She is not in acute distress.     Appearance: Normal appearance. She is well-developed and normal weight. She is not ill-appearing, toxic-appearing or diaphoretic.   Cardiovascular:      Rate and Rhythm: Normal rate and regular rhythm.      Pulses: Normal pulses.      Heart sounds: Normal heart sounds.   Pulmonary:      Effort: Pulmonary effort is normal.      Breath sounds: Normal breath sounds.   Abdominal:      General: Bowel sounds are normal.   Genitourinary:     Comments: Vag panel collect    Skin:     General: Skin is warm and dry.   Neurological:      Mental Status: She is alert and easily aroused.   Psychiatric:         Behavior: Behavior is cooperative.           Assessment & Plan   Diagnoses and all orders for this visit:    1. Negative pregnancy test (Primary)  -     POC Pregnancy, Urine    2. Acute vaginitis  -     Gardnerella vaginalis, Trichomonas vaginalis, Candida albicans, DNA - Swab, Vagina    Discussed with patient that added new stressor in her life could have caused her period to be late. UPT in office today was negative.   Will notify pt of vag panel results when available. Refrain from using things that she knows triggers her BV flares.         This document has been electronically signed by LATRICE Blanchard on March 16, 2023 11:30 CDT

## (undated) DEVICE — BITEBLOCK ENDO W/STRAP 60F A/ LF DISP

## (undated) DEVICE — SINGLE-USE BIOPSY FORCEPS: Brand: RADIAL JAW 4

## (undated) DEVICE — CANN SMPL SOFTECH BIFLO ETCO2 A/M 7FT